# Patient Record
Sex: MALE | Race: WHITE | NOT HISPANIC OR LATINO | Employment: OTHER | URBAN - METROPOLITAN AREA
[De-identification: names, ages, dates, MRNs, and addresses within clinical notes are randomized per-mention and may not be internally consistent; named-entity substitution may affect disease eponyms.]

---

## 2023-05-25 ENCOUNTER — APPOINTMENT (OUTPATIENT)
Dept: PRIMARY CARE | Facility: CLINIC | Age: 73
End: 2023-05-25
Payer: MEDICARE

## 2024-02-21 ENCOUNTER — HOSPITAL ENCOUNTER (OUTPATIENT)
Facility: HOSPITAL | Age: 74
Setting detail: OBSERVATION
Discharge: HOME | End: 2024-02-22
Attending: EMERGENCY MEDICINE | Admitting: INTERNAL MEDICINE
Payer: MEDICARE

## 2024-02-21 ENCOUNTER — APPOINTMENT (OUTPATIENT)
Dept: CARDIOLOGY | Facility: HOSPITAL | Age: 74
End: 2024-02-21
Payer: MEDICARE

## 2024-02-21 ENCOUNTER — APPOINTMENT (OUTPATIENT)
Dept: RADIOLOGY | Facility: HOSPITAL | Age: 74
End: 2024-02-21
Payer: MEDICARE

## 2024-02-21 DIAGNOSIS — R07.9 CHEST PAIN, UNSPECIFIED TYPE: Primary | ICD-10-CM

## 2024-02-21 LAB
ALBUMIN SERPL BCP-MCNC: 4.7 G/DL (ref 3.4–5)
ALP SERPL-CCNC: 64 U/L (ref 33–136)
ALT SERPL W P-5'-P-CCNC: 20 U/L (ref 10–52)
ANION GAP SERPL CALC-SCNC: 18 MMOL/L (ref 10–20)
AST SERPL W P-5'-P-CCNC: 24 U/L (ref 9–39)
BASOPHILS # BLD AUTO: 0.03 X10*3/UL (ref 0–0.1)
BASOPHILS NFR BLD AUTO: 0.4 %
BILIRUB SERPL-MCNC: 0.6 MG/DL (ref 0–1.2)
BNP SERPL-MCNC: 77 PG/ML (ref 0–99)
BUN SERPL-MCNC: 32 MG/DL (ref 6–23)
CALCIUM SERPL-MCNC: 9.7 MG/DL (ref 8.6–10.3)
CARDIAC TROPONIN I PNL SERPL HS: 11 NG/L (ref 0–20)
CARDIAC TROPONIN I PNL SERPL HS: 11 NG/L (ref 0–20)
CHLORIDE SERPL-SCNC: 98 MMOL/L (ref 98–107)
CO2 SERPL-SCNC: 21 MMOL/L (ref 21–32)
CREAT SERPL-MCNC: 1.51 MG/DL (ref 0.5–1.3)
EGFRCR SERPLBLD CKD-EPI 2021: 48 ML/MIN/1.73M*2
EOSINOPHIL # BLD AUTO: 0.02 X10*3/UL (ref 0–0.4)
EOSINOPHIL NFR BLD AUTO: 0.3 %
ERYTHROCYTE [DISTWIDTH] IN BLOOD BY AUTOMATED COUNT: 13.8 % (ref 11.5–14.5)
GLUCOSE BLD MANUAL STRIP-MCNC: 138 MG/DL (ref 74–99)
GLUCOSE BLD MANUAL STRIP-MCNC: 95 MG/DL (ref 74–99)
GLUCOSE SERPL-MCNC: 220 MG/DL (ref 74–99)
HCT VFR BLD AUTO: 36.5 % (ref 41–52)
HGB BLD-MCNC: 12 G/DL (ref 13.5–17.5)
HOLD SPECIMEN: NORMAL
IMM GRANULOCYTES # BLD AUTO: 0.03 X10*3/UL (ref 0–0.5)
IMM GRANULOCYTES NFR BLD AUTO: 0.4 % (ref 0–0.9)
LYMPHOCYTES # BLD AUTO: 1.14 X10*3/UL (ref 0.8–3)
LYMPHOCYTES NFR BLD AUTO: 16.6 %
MAGNESIUM SERPL-MCNC: 1.82 MG/DL (ref 1.6–2.4)
MCH RBC QN AUTO: 31.7 PG (ref 26–34)
MCHC RBC AUTO-ENTMCNC: 32.9 G/DL (ref 32–36)
MCV RBC AUTO: 97 FL (ref 80–100)
MONOCYTES # BLD AUTO: 0.53 X10*3/UL (ref 0.05–0.8)
MONOCYTES NFR BLD AUTO: 7.7 %
NEUTROPHILS # BLD AUTO: 5.13 X10*3/UL (ref 1.6–5.5)
NEUTROPHILS NFR BLD AUTO: 74.6 %
NRBC BLD-RTO: 0 /100 WBCS (ref 0–0)
PLATELET # BLD AUTO: 219 X10*3/UL (ref 150–450)
POTASSIUM SERPL-SCNC: 4.4 MMOL/L (ref 3.5–5.3)
PROT SERPL-MCNC: 7.3 G/DL (ref 6.4–8.2)
RBC # BLD AUTO: 3.78 X10*6/UL (ref 4.5–5.9)
SODIUM SERPL-SCNC: 133 MMOL/L (ref 136–145)
WBC # BLD AUTO: 6.9 X10*3/UL (ref 4.4–11.3)

## 2024-02-21 PROCEDURE — 36415 COLL VENOUS BLD VENIPUNCTURE: CPT | Performed by: EMERGENCY MEDICINE

## 2024-02-21 PROCEDURE — 80053 COMPREHEN METABOLIC PANEL: CPT | Performed by: EMERGENCY MEDICINE

## 2024-02-21 PROCEDURE — 85025 COMPLETE CBC W/AUTO DIFF WBC: CPT | Performed by: EMERGENCY MEDICINE

## 2024-02-21 PROCEDURE — 99285 EMERGENCY DEPT VISIT HI MDM: CPT | Mod: 25 | Performed by: EMERGENCY MEDICINE

## 2024-02-21 PROCEDURE — G0378 HOSPITAL OBSERVATION PER HR: HCPCS

## 2024-02-21 PROCEDURE — 2500000001 HC RX 250 WO HCPCS SELF ADMINISTERED DRUGS (ALT 637 FOR MEDICARE OP): Performed by: NURSE PRACTITIONER

## 2024-02-21 PROCEDURE — 84484 ASSAY OF TROPONIN QUANT: CPT | Performed by: EMERGENCY MEDICINE

## 2024-02-21 PROCEDURE — 83735 ASSAY OF MAGNESIUM: CPT | Performed by: EMERGENCY MEDICINE

## 2024-02-21 PROCEDURE — 71045 X-RAY EXAM CHEST 1 VIEW: CPT | Mod: FOREIGN READ | Performed by: RADIOLOGY

## 2024-02-21 PROCEDURE — 71045 X-RAY EXAM CHEST 1 VIEW: CPT

## 2024-02-21 PROCEDURE — 83880 ASSAY OF NATRIURETIC PEPTIDE: CPT | Performed by: EMERGENCY MEDICINE

## 2024-02-21 PROCEDURE — 2500000004 HC RX 250 GENERAL PHARMACY W/ HCPCS (ALT 636 FOR OP/ED): Performed by: NURSE PRACTITIONER

## 2024-02-21 PROCEDURE — 93005 ELECTROCARDIOGRAM TRACING: CPT

## 2024-02-21 PROCEDURE — 82947 ASSAY GLUCOSE BLOOD QUANT: CPT | Mod: 59

## 2024-02-21 RX ORDER — ENOXAPARIN SODIUM 100 MG/ML
40 INJECTION SUBCUTANEOUS EVERY 24 HOURS
Status: DISCONTINUED | OUTPATIENT
Start: 2024-02-21 | End: 2024-02-22 | Stop reason: HOSPADM

## 2024-02-21 RX ORDER — DEXTROSE 50 % IN WATER (D50W) INTRAVENOUS SYRINGE
25
Status: DISCONTINUED | OUTPATIENT
Start: 2024-02-21 | End: 2024-02-22 | Stop reason: HOSPADM

## 2024-02-21 RX ORDER — DEXTROSE MONOHYDRATE 100 MG/ML
0.3 INJECTION, SOLUTION INTRAVENOUS ONCE AS NEEDED
Status: DISCONTINUED | OUTPATIENT
Start: 2024-02-21 | End: 2024-02-22 | Stop reason: HOSPADM

## 2024-02-21 RX ORDER — INSULIN LISPRO 100 [IU]/ML
0-10 INJECTION, SOLUTION INTRAVENOUS; SUBCUTANEOUS
Status: DISCONTINUED | OUTPATIENT
Start: 2024-02-21 | End: 2024-02-22 | Stop reason: HOSPADM

## 2024-02-21 RX ORDER — NAPROXEN SODIUM 220 MG/1
324 TABLET, FILM COATED ORAL ONCE
Status: COMPLETED | OUTPATIENT
Start: 2024-02-21 | End: 2024-02-21

## 2024-02-21 RX ADMIN — ENOXAPARIN SODIUM 40 MG: 40 INJECTION SUBCUTANEOUS at 21:58

## 2024-02-21 RX ADMIN — ASPIRIN 81 MG 324 MG: 81 TABLET ORAL at 21:57

## 2024-02-21 SDOH — SOCIAL STABILITY: SOCIAL INSECURITY: ARE THERE ANY APPARENT SIGNS OF INJURIES/BEHAVIORS THAT COULD BE RELATED TO ABUSE/NEGLECT?: NO

## 2024-02-21 SDOH — SOCIAL STABILITY: SOCIAL INSECURITY: HAVE YOU HAD THOUGHTS OF HARMING ANYONE ELSE?: NO

## 2024-02-21 SDOH — SOCIAL STABILITY: SOCIAL INSECURITY: HAS ANYONE EVER THREATENED TO HURT YOUR FAMILY OR YOUR PETS?: NO

## 2024-02-21 SDOH — SOCIAL STABILITY: SOCIAL INSECURITY: WERE YOU ABLE TO COMPLETE ALL THE BEHAVIORAL HEALTH SCREENINGS?: YES

## 2024-02-21 SDOH — SOCIAL STABILITY: SOCIAL INSECURITY: ABUSE: ADULT

## 2024-02-21 SDOH — SOCIAL STABILITY: SOCIAL INSECURITY: DO YOU FEEL UNSAFE GOING BACK TO THE PLACE WHERE YOU ARE LIVING?: NO

## 2024-02-21 SDOH — SOCIAL STABILITY: SOCIAL INSECURITY: DOES ANYONE TRY TO KEEP YOU FROM HAVING/CONTACTING OTHER FRIENDS OR DOING THINGS OUTSIDE YOUR HOME?: NO

## 2024-02-21 SDOH — SOCIAL STABILITY: SOCIAL INSECURITY: ARE YOU OR HAVE YOU BEEN THREATENED OR ABUSED PHYSICALLY, EMOTIONALLY, OR SEXUALLY BY ANYONE?: NO

## 2024-02-21 SDOH — SOCIAL STABILITY: SOCIAL INSECURITY: DO YOU FEEL ANYONE HAS EXPLOITED OR TAKEN ADVANTAGE OF YOU FINANCIALLY OR OF YOUR PERSONAL PROPERTY?: NO

## 2024-02-21 ASSESSMENT — COGNITIVE AND FUNCTIONAL STATUS - GENERAL
STANDING UP FROM CHAIR USING ARMS: A LITTLE
DAILY ACTIVITIY SCORE: 19
DRESSING REGULAR LOWER BODY CLOTHING: A LITTLE
MOVING TO AND FROM BED TO CHAIR: A LITTLE
DRESSING REGULAR UPPER BODY CLOTHING: A LITTLE
MOBILITY SCORE: 19
PERSONAL GROOMING: A LITTLE
HELP NEEDED FOR BATHING: A LITTLE
TOILETING: A LITTLE
WALKING IN HOSPITAL ROOM: A LITTLE
CLIMB 3 TO 5 STEPS WITH RAILING: A LITTLE
TURNING FROM BACK TO SIDE WHILE IN FLAT BAD: A LITTLE
PATIENT BASELINE BEDBOUND: NO

## 2024-02-21 ASSESSMENT — ACTIVITIES OF DAILY LIVING (ADL)
BATHING: INDEPENDENT
PATIENT'S MEMORY ADEQUATE TO SAFELY COMPLETE DAILY ACTIVITIES?: YES
HEARING - LEFT EAR: FUNCTIONAL
LACK_OF_TRANSPORTATION: NO
DRESSING YOURSELF: INDEPENDENT
TOILETING: INDEPENDENT
FEEDING YOURSELF: INDEPENDENT
WALKS IN HOME: NEEDS ASSISTANCE
ADEQUATE_TO_COMPLETE_ADL: YES
HEARING - RIGHT EAR: FUNCTIONAL
GROOMING: INDEPENDENT
JUDGMENT_ADEQUATE_SAFELY_COMPLETE_DAILY_ACTIVITIES: YES

## 2024-02-21 ASSESSMENT — LIFESTYLE VARIABLES
AUDIT-C TOTAL SCORE: 0
HOW OFTEN DO YOU HAVE A DRINK CONTAINING ALCOHOL: NEVER
SKIP TO QUESTIONS 9-10: 1
HOW MANY STANDARD DRINKS CONTAINING ALCOHOL DO YOU HAVE ON A TYPICAL DAY: PATIENT DOES NOT DRINK
AUDIT-C TOTAL SCORE: 0
HOW OFTEN DO YOU HAVE 6 OR MORE DRINKS ON ONE OCCASION: NEVER

## 2024-02-21 ASSESSMENT — PAIN DESCRIPTION - ORIENTATION: ORIENTATION: ANTERIOR

## 2024-02-21 ASSESSMENT — PAIN DESCRIPTION - LOCATION: LOCATION: CHEST

## 2024-02-21 ASSESSMENT — PATIENT HEALTH QUESTIONNAIRE - PHQ9
2. FEELING DOWN, DEPRESSED OR HOPELESS: NOT AT ALL
1. LITTLE INTEREST OR PLEASURE IN DOING THINGS: NOT AT ALL
SUM OF ALL RESPONSES TO PHQ9 QUESTIONS 1 & 2: 0

## 2024-02-21 ASSESSMENT — COLUMBIA-SUICIDE SEVERITY RATING SCALE - C-SSRS
2. HAVE YOU ACTUALLY HAD ANY THOUGHTS OF KILLING YOURSELF?: NO
1. IN THE PAST MONTH, HAVE YOU WISHED YOU WERE DEAD OR WISHED YOU COULD GO TO SLEEP AND NOT WAKE UP?: NO
6. HAVE YOU EVER DONE ANYTHING, STARTED TO DO ANYTHING, OR PREPARED TO DO ANYTHING TO END YOUR LIFE?: NO

## 2024-02-21 ASSESSMENT — PAIN SCALES - GENERAL: PAINLEVEL_OUTOF10: 6

## 2024-02-21 ASSESSMENT — PAIN DESCRIPTION - FREQUENCY: FREQUENCY: INTERMITTENT

## 2024-02-21 ASSESSMENT — PAIN - FUNCTIONAL ASSESSMENT: PAIN_FUNCTIONAL_ASSESSMENT: 0-10

## 2024-02-21 ASSESSMENT — PAIN DESCRIPTION - DESCRIPTORS: DESCRIPTORS: PRESSURE

## 2024-02-22 VITALS
BODY MASS INDEX: 33.6 KG/M2 | RESPIRATION RATE: 18 BRPM | WEIGHT: 240 LBS | TEMPERATURE: 97.3 F | HEART RATE: 60 BPM | DIASTOLIC BLOOD PRESSURE: 58 MMHG | SYSTOLIC BLOOD PRESSURE: 118 MMHG | OXYGEN SATURATION: 92 % | HEIGHT: 71 IN

## 2024-02-22 LAB
ANION GAP SERPL CALC-SCNC: 15 MMOL/L (ref 10–20)
APTT PPP: 33 SECONDS (ref 27–38)
BUN SERPL-MCNC: 33 MG/DL (ref 6–23)
CALCIUM SERPL-MCNC: 9.1 MG/DL (ref 8.6–10.3)
CARDIAC TROPONIN I PNL SERPL HS: 13 NG/L (ref 0–20)
CHLORIDE SERPL-SCNC: 102 MMOL/L (ref 98–107)
CHOLEST SERPL-MCNC: 170 MG/DL (ref 0–199)
CHOLESTEROL/HDL RATIO: 3.5
CO2 SERPL-SCNC: 21 MMOL/L (ref 21–32)
CREAT SERPL-MCNC: 1.32 MG/DL (ref 0.5–1.3)
EGFRCR SERPLBLD CKD-EPI 2021: 56 ML/MIN/1.73M*2
ERYTHROCYTE [DISTWIDTH] IN BLOOD BY AUTOMATED COUNT: 13.6 % (ref 11.5–14.5)
GLUCOSE BLD MANUAL STRIP-MCNC: 155 MG/DL (ref 74–99)
GLUCOSE BLD MANUAL STRIP-MCNC: 184 MG/DL (ref 74–99)
GLUCOSE SERPL-MCNC: 135 MG/DL (ref 74–99)
HCT VFR BLD AUTO: 34.7 % (ref 41–52)
HDLC SERPL-MCNC: 49.2 MG/DL
HGB BLD-MCNC: 11.5 G/DL (ref 13.5–17.5)
INR PPP: 1.1 (ref 0.9–1.1)
LDLC SERPL CALC-MCNC: 97 MG/DL
MCH RBC QN AUTO: 32.5 PG (ref 26–34)
MCHC RBC AUTO-ENTMCNC: 33.1 G/DL (ref 32–36)
MCV RBC AUTO: 98 FL (ref 80–100)
NON HDL CHOLESTEROL: 121 MG/DL (ref 0–149)
NRBC BLD-RTO: 0 /100 WBCS (ref 0–0)
PLATELET # BLD AUTO: 195 X10*3/UL (ref 150–450)
POTASSIUM SERPL-SCNC: 4.1 MMOL/L (ref 3.5–5.3)
PROTHROMBIN TIME: 12.8 SECONDS (ref 9.8–12.8)
RBC # BLD AUTO: 3.54 X10*6/UL (ref 4.5–5.9)
SODIUM SERPL-SCNC: 134 MMOL/L (ref 136–145)
TRIGL SERPL-MCNC: 120 MG/DL (ref 0–149)
VLDL: 24 MG/DL (ref 0–40)
WBC # BLD AUTO: 5.4 X10*3/UL (ref 4.4–11.3)

## 2024-02-22 PROCEDURE — G0378 HOSPITAL OBSERVATION PER HR: HCPCS

## 2024-02-22 PROCEDURE — 82374 ASSAY BLOOD CARBON DIOXIDE: CPT | Performed by: NURSE PRACTITIONER

## 2024-02-22 PROCEDURE — 82947 ASSAY GLUCOSE BLOOD QUANT: CPT | Mod: 59

## 2024-02-22 PROCEDURE — 85027 COMPLETE CBC AUTOMATED: CPT | Performed by: NURSE PRACTITIONER

## 2024-02-22 PROCEDURE — 84484 ASSAY OF TROPONIN QUANT: CPT | Performed by: NURSE PRACTITIONER

## 2024-02-22 PROCEDURE — 96374 THER/PROPH/DIAG INJ IV PUSH: CPT | Performed by: INTERNAL MEDICINE

## 2024-02-22 PROCEDURE — 99222 1ST HOSP IP/OBS MODERATE 55: CPT | Performed by: NURSE PRACTITIONER

## 2024-02-22 PROCEDURE — 99235 HOSP IP/OBS SAME DATE MOD 70: CPT | Performed by: INTERNAL MEDICINE

## 2024-02-22 PROCEDURE — 36415 COLL VENOUS BLD VENIPUNCTURE: CPT | Performed by: NURSE PRACTITIONER

## 2024-02-22 PROCEDURE — 97165 OT EVAL LOW COMPLEX 30 MIN: CPT | Mod: GO

## 2024-02-22 PROCEDURE — 85610 PROTHROMBIN TIME: CPT | Performed by: NURSE PRACTITIONER

## 2024-02-22 PROCEDURE — 97162 PT EVAL MOD COMPLEX 30 MIN: CPT | Mod: GP

## 2024-02-22 PROCEDURE — 97530 THERAPEUTIC ACTIVITIES: CPT | Mod: GP

## 2024-02-22 PROCEDURE — 80061 LIPID PANEL: CPT | Performed by: NURSE PRACTITIONER

## 2024-02-22 PROCEDURE — 2500000004 HC RX 250 GENERAL PHARMACY W/ HCPCS (ALT 636 FOR OP/ED): Performed by: NURSE PRACTITIONER

## 2024-02-22 PROCEDURE — 96372 THER/PROPH/DIAG INJ SC/IM: CPT

## 2024-02-22 PROCEDURE — 2500000002 HC RX 250 W HCPCS SELF ADMINISTERED DRUGS (ALT 637 FOR MEDICARE OP, ALT 636 FOR OP/ED): Performed by: NURSE PRACTITIONER

## 2024-02-22 RX ORDER — ONDANSETRON HYDROCHLORIDE 2 MG/ML
4 INJECTION, SOLUTION INTRAVENOUS EVERY 4 HOURS PRN
Status: DISCONTINUED | OUTPATIENT
Start: 2024-02-22 | End: 2024-02-22 | Stop reason: HOSPADM

## 2024-02-22 RX ORDER — PANTOPRAZOLE SODIUM 40 MG/1
40 TABLET, DELAYED RELEASE ORAL DAILY
Qty: 30 TABLET | Refills: 0 | Status: SHIPPED | OUTPATIENT
Start: 2024-02-22 | End: 2024-02-27 | Stop reason: SDUPTHER

## 2024-02-22 RX ORDER — MORPHINE SULFATE 4 MG/ML
4 INJECTION, SOLUTION INTRAMUSCULAR; INTRAVENOUS ONCE
Status: COMPLETED | OUTPATIENT
Start: 2024-02-22 | End: 2024-02-22

## 2024-02-22 RX ORDER — PANTOPRAZOLE SODIUM 40 MG/1
40 TABLET, DELAYED RELEASE ORAL DAILY
Qty: 30 TABLET | Refills: 0 | Status: SHIPPED | OUTPATIENT
Start: 2024-02-22 | End: 2024-02-22 | Stop reason: SDUPTHER

## 2024-02-22 RX ADMIN — MORPHINE SULFATE 4 MG: 4 INJECTION, SOLUTION INTRAMUSCULAR; INTRAVENOUS at 02:48

## 2024-02-22 RX ADMIN — INSULIN LISPRO 2 UNITS: 100 INJECTION, SOLUTION INTRAVENOUS; SUBCUTANEOUS at 12:16

## 2024-02-22 RX ADMIN — INSULIN LISPRO 2 UNITS: 100 INJECTION, SOLUTION INTRAVENOUS; SUBCUTANEOUS at 06:50

## 2024-02-22 ASSESSMENT — PAIN - FUNCTIONAL ASSESSMENT
PAIN_FUNCTIONAL_ASSESSMENT: 0-10
PAIN_FUNCTIONAL_ASSESSMENT: 0-10

## 2024-02-22 ASSESSMENT — COGNITIVE AND FUNCTIONAL STATUS - GENERAL
PERSONAL GROOMING: A LITTLE
MOBILITY SCORE: 16
CLIMB 3 TO 5 STEPS WITH RAILING: TOTAL
HELP NEEDED FOR BATHING: A LOT
DAILY ACTIVITIY SCORE: 15
EATING MEALS: A LITTLE
WALKING IN HOSPITAL ROOM: A LITTLE
DRESSING REGULAR LOWER BODY CLOTHING: A LOT
STANDING UP FROM CHAIR USING ARMS: A LITTLE
TOILETING: A LOT
MOVING FROM LYING ON BACK TO SITTING ON SIDE OF FLAT BED WITH BEDRAILS: A LITTLE
DRESSING REGULAR UPPER BODY CLOTHING: A LITTLE
TURNING FROM BACK TO SIDE WHILE IN FLAT BAD: A LITTLE
MOVING TO AND FROM BED TO CHAIR: A LITTLE

## 2024-02-22 ASSESSMENT — PAIN SCALES - GENERAL
PAINLEVEL_OUTOF10: 7
PAINLEVEL_OUTOF10: 7
PAINLEVEL_OUTOF10: 0 - NO PAIN

## 2024-02-22 ASSESSMENT — PAIN DESCRIPTION - LOCATION: LOCATION: GENERALIZED

## 2024-02-22 NOTE — PROGRESS NOTES
Occupational Therapy    Evaluation    Patient Name: Albert Abbott  MRN: [id number]  Today's Date: 2/22/2024  Time Calculation  Start Time: 1359  Stop Time: 1430  Time Calculation (min): 31 min    Assessment  IP OT Assessment  Prognosis: Good  End of Session Communication: Bedside nurse, Care Coordinator  End of Session Patient Position: Bed, 2 rail up (bed alarm not on/not on at start of session, call light in reach)    Plan:  Treatment Interventions: ADL retraining, Functional transfer training, UE strengthening/ROM, Neuromuscular reeducation, Compensatory technique education  OT Frequency: 3 times per week  OT Discharge Recommendations: Moderate intensity level of continued care, 24 hr supervision due to cognition (24 hour hands on assist for balance/safety as patient is high fall risk.)  OT - OK to Discharge: Yes (from an O.T. standpoint)    Subjective     Current Problem:  1. Chest pain, unspecified type  pantoprazole (ProtoNix) 40 mg EC tablet          General:  General  Reason for Referral: OT eval & treat for ADLs  Referred By: Lakesha Matthews, DEIDRE-CNP  Past Medical History Relevant to Rehab: cc:chest tightness, SOB; Troponins negative. (Hypertension, diabetes, anxiety, COPD, seizure x 1, osteoarthritis)  Prior to Session Communication: Bedside nurse  Patient Position Received: Bed, 2 rail up    Precautions:  Precautions Comment: fall/safety       Pain:  Pain Assessment  Pain Assessment: 0-10  Pain Score: 7  Pain Type: Chronic pain  Pain Location:  (back, BLE)    Objective     Cognition:  Overall Cognitive Status:  (A & O x 3, questionable historian; requires mod verbal and tactile cues for safety. Impaired insight into balance/safety deficits. High fall risk.)     Home Living:  Home Living Comments: Lives with wife; bed/bath one level; basement entry from garage with stair lift; Patient reports independent ADLS with reachers, sock aide & long shoe horn; Stall shower with seat & grab bar, raised toilet  seat in basement; Patient reports he is independent transfers & mobility with cane or rollator. Wife does all IADLs; patient does not drive.       ADL:  Grooming Assistance: Stand by  UE Dressing Assistance: Minimal  LE Dressing Assistance: Maximal  Toileting Assistance with Device: Moderate  ADL Comments: assist all ADLs secondary to impaired sit and stand balance, impaired safety ad impaired BUE ROM & flexibility.         Bed Mobility/Transfers:   Bed Mobility  Bed Mobility:  (SBA supine <-> sit with rail; labored, increased time)  Transfers  Transfer:  (min assist sit to stand from edge of bed; labored; mod cues safety;  min assist to stand from raised toilet with rails.)    Ambulation/Gait Training:  Ambulation/Gait Training  Ambulation/Gait Training Performed:  (Patient atttempted to use cane initially; unable to balance; Required min assist/CGA during mobility with wheeled walker.)    Sitting Balance:  Static Sitting Balance  Static Sitting-Level of Assistance:  (SBA with UE support)  Dynamic Sitting Balance  Dynamic Sitting-Balance:  (CGA with UE support)       Coordination:  Coordination Comment: tremulous BUE; finger opposition grossly WFL;          Extremities: RUE   RUE :  (AROM grossly 90 degrees shoulder flexion, 3/4 range ER/IR & elbow flexion/extension, WFL wrist/hand.  MMT grossly 3-/5 proximally, 4-/5 distally.) and LUE   LUE:  (AROM grossly 60 degrees shoulder flexion with pain; 3/4 range ER/IR & elbow flexion/extension, WFL wrist/hand.  MMT 2-/5 proximally, 4-/5 distally.)    Outcome Measures: [facility] Daily Activity  Putting on and taking off regular lower body clothing: A lot  Bathing (including washing, rinsing, drying): A lot  Putting on and taking off regular upper body clothing: A little  Toileting, which includes using toilet, bedpan or urinal: A lot  Taking care of personal grooming such as brushing teeth: A little  Eating Meals: A little  Daily Activity - Total Score: 15            EDUCATION:  Education  Individual(s) Educated: Patient  Education Provided: Fall precautons  Patient/Caregiver Demonstrated Understanding: yes  Education Documentation  ADL Training, taught by Yecenia Hernadez OT at 2/22/2024  3:07 PM.  Learner: Patient  Readiness: Acceptance  Method: Explanation  Response: Verbalizes Understanding, Needs Reinforcement    Education Comments  No comments found.        Goals:   Encounter Problems       Encounter Problems (Active)       OT Goals       Increase static & dynamic sit balance to supervision with UE support to promote increased independent with ADLs       Start:  02/22/24    Expected End:  03/07/24            Increase UE dressing to SBA and LE dressing to CGA with adaptive equipment as needed.        Start:  02/22/24    Expected End:  03/07/24            Increase functional transfers to/from bed, chair & commode to CGA with DME for safety        Start:  02/22/24    Expected End:  03/07/24            Demonstrate compliance to safety techs with min cues during ADLs        Start:  02/22/24    Expected End:  03/07/24

## 2024-02-22 NOTE — ED TRIAGE NOTES
Intermittent chest pressure last 2 days , radiates across anterior chest. Today came after eating pizza. Pain free now

## 2024-02-22 NOTE — PROGRESS NOTES
02/22/24 1239   Discharge Planning   Living Arrangements Spouse/significant other   Support Systems Spouse/significant other   Type of Residence Private residence   Who is requesting discharge planning? Provider   Patient expects to be discharged to: home   Financial Resource Strain   How hard is it for you to pay for the very basics like food, housing, medical care, and heating? Not very     Met with pt , pt admit for chest pain, seen this am by cardiology and is cleared for discharge,  pt will go home today, pt would benefit from c, pt refusing but did speak with pt's RN and she agrees, per pt he is walker at baseline.  RN will call Dr WARD Nguyen and get hhc orders and discharge.  Carmen Underwood RN [facility]
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
## 2024-02-22 NOTE — PROGRESS NOTES
Physical Therapy    Physical Therapy Evaluation    Patient Name: Albert Abbott  MRN: [id number]  Today's Date: 2/22/2024   Time Calculation  Start Time: 1400  Stop Time: 1430  Time Calculation (min): 30 min    Assessment/Plan   PT Assessment  PT Assessment Results: Decreased strength, Decreased endurance, Impaired balance, Decreased mobility  End of Session Communication: Bedside nurse  End of Session Patient Position: Bed, 2 rail up, Alarm off, not on at start of session (All needs in reach and no complaints noted)  IP OR SWING BED PT PLAN  Inpatient or Swing Bed: Inpatient  PT Plan  Treatment/Interventions: Bed mobility, Transfer training, Gait training  PT Plan: Skilled PT  PT Frequency: 3 times per week  PT Discharge Recommendations: Moderate intensity level of continued care  PT - OK to Discharge: Yes (once cleared by medical team)    Subjective     Current Problem:  Patient Active Problem List   Diagnosis    Chest pain, unspecified type     General Visit Information:  General  Reason for Referral: PT Eval and Treat  Referred By: Lakesha Matthews, DEIDRE-CNP  Past Medical History Relevant to Rehab: 73 y.o. male presenting to emergency department with chest tightness.  Patient states that yesterday and today he had been having intermittent chest tightness.  He describes it as a tightness across his upper chest mostly in the right upper chest.  He states that at times he feels shortness of breath, denies any history of coronary vascular disease.  Patient states he had a fall approximately 1 year ago and has since then had some chronic back pain.  Patient does state that the pain does radiate more into his neck and he was feeling anxious.  Prior to Session Communication: Bedside nurse  Patient Position Received: Bed, 2 rail up, Alarm off, not on at start of session (Agreeable to PT)    Home Living:  Home Living  Home Living Comments: Pt lives with his wife in a 2 story house with entrance to the basement through  the garage and 0 [address]. Pt has a stair glide from the basement to the main floor where his bedroom and bathroom are. There is a bathroom in the basement with a high toilet and grab bar and a full bath on the 1st floor with a walk in shower, seat, and grab bar. Pt does not use the toilet in the 1st floor bathroom.    Prior Level of Function:  Prior Function Per Pt/Caregiver Report  Level of [place]: Independent with ADLs and functional transfers (Pt amb with rollator or SPC, wife does all IADLs and driving)    Precautions:  Precautions  Precautions Comment: Fall precautions    Vital Signs:     Objective     Pain:  Pain Assessment  Pain Assessment:  (7/10 chronic low back and B LEs, repositioned for comfort s/p session, RN aware)    General Assessments:  Sensation  Light Touch: No apparent deficits  Strength  Strength Comments: B LE ROM WFL, B LE strength grossly 4-/5  Dynamic Standing Balance  Dynamic Standing-Comments: Fair- to Poor+ with RW    Functional Assessments:  Bed Mobility  Bed Mobility:  (supine <> sitting: SBA with increased effort and time required)  Transfers  Transfer:  (STS from EOB and commode: min A with cueing for hand placement. Pt demonstrated retropulsion in standing requiring increased assist to gain and maintain balance)  Ambulation/Gait Training  Ambulation/Gait Training Performed:  (Pt first requested attempting amb with his SPC, however due to retropulsion, pt was unable to gain his balance and required a RW to amb. Pt was then able to amb 25' x 2 using RW with CGA to min A. Pt is a fall risk.)    Outcome Measures:  [facility] Basic Mobility  Turning from your back to your side while in a flat bed without using bedrails: A little  Moving from lying on your back to sitting on the side of a flat bed without using bedrails: A little  Moving to and from bed to chair (including a wheelchair): A little  Standing up from a chair using your arms (e.g. wheelchair or bedside chair): A little  To walk  in hospital room: A little  Climbing 3-5 steps with railing: Total  Basic Mobility - Total Score: 16    Goals:  Encounter Problems       Encounter Problems (Active)       PT Problem       STG - Pt will transition supine <> sitting with SUP  (Progressing)       Start:  02/22/24    Expected End:  03/07/24            STG - Pt will transfer STS with SBA  (Progressing)       Start:  02/22/24    Expected End:  03/07/24            STG - Pt will amb 50' using LRD with SBA  (Progressing)       Start:  02/22/24    Expected End:  03/07/24                 Education Documentation  Precautions, taught by Mellissa Laws PT at 2/22/2024  3:01 PM.  Learner: Patient  Readiness: Acceptance  Method: Explanation  Response: Verbalizes Understanding, Needs Reinforcement    Mobility Training, taught by Mellissa Laws PT at 2/22/2024  3:01 PM.  Learner: Patient  Readiness: Acceptance  Method: Explanation  Response: Verbalizes Understanding, Needs Reinforcement    Education Comments  No comments found.

## 2024-02-22 NOTE — CARE PLAN
The patient's goals for the shift include  no chest pain    The clinical goals for the shift include Patient will remain hemodynamically stable

## 2024-02-22 NOTE — H&P
History Of Present Illness  Albert Abbott is a 73 y.o. male presenting to emergency department with chest tightness.  Patient states that yesterday and today he had been having intermittent chest tightness.  He describes it as a tightness across his upper chest mostly in the right upper chest.  He states that at times he feels shortness of breath, denies any history of coronary vascular disease.  Patient states he had a fall approximately 1 year ago and has since then had some chronic back pain.  Patient does state that the pain does radiate more into his neck and he was feeling anxious.  Patient denies dizziness, nausea, vomiting, diarrhea.  Patient denies recent illness.    In ED, troponin negative x 2, glucose 220, sodium 133, BUN 32, creatinine 1.51, GFR 48, hemoglobin 12.0, hematocrit 36.5.  EKG completed showing first-degree AV block with sinus rhythm at a rate of 81 without ST elevation or depression per ER physician review.  Chest x-ray completed and negative for acute process.  Vital signs within normal limits.  Patient given aspirin 324 p.o. patient admitted to telemetry observation under the care of Dr. Rodrigo Nguyen will continue to follow.  I was asked to do H&P and place initial admission orders.     Past Medical History  Hypertension, diabetes, anxiety, COPD, seizure x 1, osteoarthritis  Surgical History  Colonoscopy     Social History  Former smoker, no drug use, no alcohol use  Family History  Reviewed and noncontributory     Allergies  Patient has no known allergies.    Review of Systems  A 10 point review of systems was completed and negative except what is listed in HPI  Physical Exam  Constitutional:       Appearance: Normal appearance.   HENT:      Mouth/Throat:      Mouth: Mucous membranes are dry.      Pharynx: Oropharynx is clear.   Eyes:      Pupils: Pupils are equal, round, and reactive to light.   Cardiovascular:      Rate and Rhythm: Normal rate. Rhythm irregular.   Pulmonary:     "  Effort: Pulmonary effort is normal.      Breath sounds: Normal breath sounds.   Abdominal:      General: Bowel sounds are normal.      Palpations: Abdomen is soft.   Musculoskeletal:         General: Normal range of motion.      Cervical back: Normal range of motion.   Skin:     General: Skin is warm and dry.      Capillary Refill: Capillary refill takes less than 2 seconds.   Neurological:      General: No focal deficit present.      Mental Status: He is alert and oriented to person, place, and time.   Psychiatric:         Mood and Affect: Mood normal.          Last Recorded Vitals  Blood pressure 129/62, pulse 67, temperature 36.7 °C (98.1 °F), temperature source Temporal, resp. rate 20, height 1.803 m (5' 11\"), weight 109 kg (240 lb), SpO2 96 %.    Relevant Results  XR chest 1 view    Result Date: 2/21/2024  STUDY: Chest Radiograph;  2/21/2024, 7:36PM INDICATION: Chest pain. COMPARISON: 5/5/2023 XR Chest ACCESSION NUMBER(S): [id number] ORDERING CLINICIAN: IVA ATKINS TECHNIQUE:  Frontal chest (two images) was obtained at 19:22 hours. FINDINGS: CARDIOMEDIASTINAL SILHOUETTE: Heart is top normal size. Minimal calcification is seen in the thoracic aorta.  LUNGS: Lungs are clear. There is no pneumothorax.  ABDOMEN: No remarkable upper abdominal findings.  BONES: No acute osseous changes.    No detectable active cardiopulmonary disease. Signed by Justin Marley MD   Results for orders placed or performed during the hospital encounter of 02/21/24 (from the past 24 hour(s))   CBC and Auto Differential   Result Value Ref Range    WBC 6.9 4.4 - 11.3 x10*3/uL    nRBC 0.0 0.0 - 0.0 /100 WBCs    RBC 3.78 (L) 4.50 - 5.90 x10*6/uL    Hemoglobin 12.0 (L) 13.5 - 17.5 g/dL    Hematocrit 36.5 (L) 41.0 - 52.0 %    MCV 97 80 - 100 fL    MCH 31.7 26.0 - 34.0 pg    MCHC 32.9 32.0 - 36.0 g/dL    RDW 13.8 11.5 - 14.5 %    Platelets 219 150 - 450 x10*3/uL    Neutrophils % 74.6 40.0 - 80.0 %    Immature Granulocytes %, Automated 0.4 " 0.0 - 0.9 %    Lymphocytes % 16.6 13.0 - 44.0 %    Monocytes % 7.7 2.0 - 10.0 %    Eosinophils % 0.3 0.0 - 6.0 %    Basophils % 0.4 0.0 - 2.0 %    Neutrophils Absolute 5.13 1.60 - 5.50 x10*3/uL    Immature Granulocytes Absolute, Automated 0.03 0.00 - 0.50 x10*3/uL    Lymphocytes Absolute 1.14 0.80 - 3.00 x10*3/uL    Monocytes Absolute 0.53 0.05 - 0.80 x10*3/uL    Eosinophils Absolute 0.02 0.00 - 0.40 x10*3/uL    Basophils Absolute 0.03 0.00 - 0.10 x10*3/uL   Magnesium   Result Value Ref Range    Magnesium 1.82 1.60 - 2.40 mg/dL   Comprehensive metabolic panel   Result Value Ref Range    Glucose 220 (H) 74 - 99 mg/dL    Sodium 133 (L) 136 - 145 mmol/L    Potassium 4.4 3.5 - 5.3 mmol/L    Chloride 98 98 - 107 mmol/L    Bicarbonate 21 21 - 32 mmol/L    Anion Gap 18 10 - 20 mmol/L    Urea Nitrogen 32 (H) 6 - 23 mg/dL    Creatinine 1.51 (H) 0.50 - 1.30 mg/dL    eGFR 48 (L) >60 mL/min/1.73m*2    Calcium 9.7 8.6 - 10.3 mg/dL    Albumin 4.7 3.4 - 5.0 g/dL    Alkaline Phosphatase 64 33 - 136 U/L    Total Protein 7.3 6.4 - 8.2 g/dL    AST 24 9 - 39 U/L    Bilirubin, Total 0.6 0.0 - 1.2 mg/dL    ALT 20 10 - 52 U/L   B-Type Natriuretic Peptide   Result Value Ref Range    BNP 77 0 - 99 pg/mL   Troponin I, High Sensitivity, Initial   Result Value Ref Range    Troponin I, High Sensitivity 11 0 - 20 ng/L   Light Blue Top   Result Value Ref Range    Extra Tube Hold for add-ons.    Troponin, High Sensitivity, 1 Hour   Result Value Ref Range    Troponin I, High Sensitivity 11 0 - 20 ng/L   POCT GLUCOSE   Result Value Ref Range    POCT Glucose 138 (H) 74 - 99 mg/dL   POCT GLUCOSE   Result Value Ref Range    POCT Glucose 95 74 - 99 mg/dL       Assessment/Plan   Albert is a 73-year-old male patient is alert and oriented x 3 presenting to emergency department with complaint of chest tightness.  Patient states that yesterday he began to have intermittent chest tightness mostly in the right upper chest he states that it does radiate into  his neck and makes him feel anxious.  Troponin negative x 2, glucose 220, BUN 32, creatinine 1.51, GFR 48.  Chest x-ray completed and negative for acute process.  EKG showing first-degree AV block at a rate of 81 without ST elevation or depression per ER physician review.  Patient given aspirin in ED, admitted for further medical management.    Chest pain  Admit to telemetry observation per Dr. Rodrigo Nguyen  See imaging results above  Trend troponin  Aspirin 324 p.o. in ED  Telemetry monitoring  Repeat labs in a.m.    Diabetes/hypertension/seizures/osteoarthritis/COPD  Hold oral antidiabetic medications when med rec is complete  Continue home medications as appropriate when med rec is complete  Cardiac/diabetic diet  ISS  Hypoglycemia protocol    DVT Ppx  SCDs  Lovenox subcutaneous  Up to chair/bathroom privileges      I spent 25 minutes in the professional and overall care of this patient.      Mikaela Loomis, APRN-CNP

## 2024-02-22 NOTE — CARE PLAN
The patient's goals for the shift include  no chest pain    The clinical goals for the shift include Patient will remain hemodynamically stable    Problem: Safety  Goal: Patient will be injury free during hospitalization  Outcome: Progressing  Goal: I will remain free of falls  Outcome: Progressing     Problem: Daily Care  Goal: Daily care needs are met  Outcome: Progressing     Problem: Psychosocial Needs  Goal: Demonstrates ability to cope with hospitalization/illness  Outcome: Progressing  Goal: Collaborate with me, my family, and caregiver to identify my specific goals  Outcome: Progressing  Flowsheets (Taken 2/21/2024 3095)  Cultural Requests During Hospitalization: none  Spiritual Requests During Hospitalization: none

## 2024-02-22 NOTE — CONSULTS
Cardiology Consult    Impression:  Atypical chest pain.  No evidence for ACS.    Nonischemic stress test 7/22.  Hypertension  Diabetes  History of stroke   COPD  CKD  Plan:  No additional inpatient cardiac workup required.  I am okay with discharge  Follow-up with the [facility]  Chest tightness  HPI:  73-year-old man brought to hospital for evaluation of tightness in his chest.  While eating pizza yesterday felt some tightness in his chest.  Family was concerned so the squad was called.  Symptoms resolved after a few minutes.   EKG showed sinus rhythm with no ischemic changes.  Troponins are negative.  Has been observed overnight.  There has been no recurrence of the chest discomfort.  At baseline the patient is quite sedentary.  He is limited by chronic back pain.  He uses a walker to get around.  Meds:  Scheduled medications  enoxaparin, 40 mg, subcutaneous, q24h  insulin lispro, 0-10 Units, subcutaneous, Before meals & nightly      Continuous medications     PRN medications  PRN medications: dextrose 10 % in water (D10W), dextrose, glucagon, ondansetron, oxygen    PMHx:  Stroke  Hypertension  Diabetes  COPD  Chronic mechanical back pain  Social history:  Lives with his wife.  No cigarettes or alcohol.  Family history:  Noncontributory  Review of systems:  See HPI  Physical exam:  Vitals:    02/22/24 0726   BP: 118/58   Pulse: 60   Resp:    Temp: 36.3 °C (97.3 °F)   SpO2: 92%      JVP not elevated. Carotid upstroke normal. No carotid bruits. Heart sounds normal. No extra sounds or murmurs. Chest clear. Abdomen soft, nontender. No masses. Peripheral pulses palpable. No edema.  EKG:  Sinus bradycardia, first-degree AV block.  Diffuse specific ST-T abnormalities  Echo:  2022: Normal EF    The  Labs:  Lab Results   Component Value Date    WBC 5.4 02/22/2024    HGB 11.5 (L) 02/22/2024    HCT 34.7 (L) 02/22/2024     02/22/2024    CHOL 170 02/22/2024    TRIG 120 02/22/2024    HDL 49.2 02/22/2024    ALT 20 02/21/2024     AST 24 02/21/2024     (L) 02/22/2024    K 4.1 02/22/2024     02/22/2024    CREATININE 1.32 (H) 02/22/2024    BUN 33 (H) 02/22/2024    CO2 21 02/22/2024    TSH 0.65 01/19/2021    INR 1.1 02/22/2024    HGBA1C 7.2 01/19/2021     par

## 2024-02-23 ENCOUNTER — HOME HEALTH ADMISSION (OUTPATIENT)
Dept: HOME HEALTH SERVICES | Facility: HOME HEALTH | Age: 74
End: 2024-02-23
Payer: MEDICARE

## 2024-02-24 LAB
ATRIAL RATE: 81 BPM
P AXIS: 66 DEGREES
PR INTERVAL: 376 MS
Q ONSET: 218 MS
QRS COUNT: 13 BEATS
QRS DURATION: 88 MS
QT INTERVAL: 366 MS
QTC CALCULATION(BAZETT): 425 MS
QTC FREDERICIA: 404 MS
R AXIS: 57 DEGREES
T AXIS: 102 DEGREES
T OFFSET: 401 MS
VENTRICULAR RATE: 81 BPM

## 2024-02-26 ENCOUNTER — HOME CARE VISIT (OUTPATIENT)
Dept: HOME HEALTH SERVICES | Facility: HOME HEALTH | Age: 74
End: 2024-02-26

## 2024-02-27 DIAGNOSIS — R07.9 CHEST PAIN, UNSPECIFIED TYPE: ICD-10-CM

## 2024-02-27 RX ORDER — PANTOPRAZOLE SODIUM 40 MG/1
40 TABLET, DELAYED RELEASE ORAL DAILY
Qty: 30 TABLET | Refills: 8 | Status: SHIPPED | OUTPATIENT
Start: 2024-02-27 | End: 2024-11-23

## 2024-02-28 DIAGNOSIS — R07.9 CHEST PAIN, UNSPECIFIED TYPE: ICD-10-CM

## 2024-02-28 NOTE — DISCHARGE SUMMARY
Discharge Diagnosis  Chest pain, unspecified type    Issues Requiring Follow-Up  Chest pain    Test Results Pending At Discharge  Pending Labs       No current pending labs.            Hospital Course   Albert Abbott is a 73 y.o. male presenting to emergency department with chest tightness.  Patient states that yesterday and today he had been having intermittent chest tightness.  He describes it as a tightness across his upper chest mostly in the right upper chest.  He states that at times he feels shortness of breath, denies any history of coronary vascular disease.  Patient states he had a fall approximately 1 year ago and has since then had some chronic back pain.  Patient does state that the pain does radiate more into his neck and he was feeling anxious.  Patient denies dizziness, nausea, vomiting, diarrhea.  Patient denies recent illness.     In ED, troponin negative x 2, glucose 220, sodium 133, BUN 32, creatinine 1.51, GFR 48, hemoglobin 12.0, hematocrit 36.5.  EKG completed showing first-degree AV block with sinus rhythm at a rate of 81 without ST elevation or depression per ER physician review.  Chest x-ray completed and negative for acute process.  Vital signs within normal limits.  Patient given aspirin 89 p.o. patient admitted to telemetry observation. He was seen by Cardiology and cleared for discharge.    Pertinent Physical Exam At Time of Discharge  Physical Exam  Constitutional:       Appearance: Normal appearance.   HENT:      Mouth/Throat:      Mouth: Mucous membranes are dry.      Pharynx: Oropharynx is clear.   Eyes:      Pupils: Pupils are equal, round, and reactive to light.   Cardiovascular:      Rate and Rhythm: Normal rate. Rhythm irregular.   Pulmonary:      Effort: Pulmonary effort is normal.      Breath sounds: Normal breath sounds.   Abdominal:      General: Bowel sounds are normal.      Palpations: Abdomen is soft.   Musculoskeletal:         General: Normal range of motion.       Cervical back: Normal range of motion.   Skin:     General: Skin is warm and dry.      Capillary Refill: Capillary refill takes less than 2 seconds.   Neurological:      General: No focal deficit present.      Mental Status: He is alert and oriented to person, place, and time.   Psychiatric:         Mood and Affect: Mood normal.      Home Medications     Medication List      You have not been prescribed any medications.       Outpatient Follow-Up  Future Appointments   Date Time Provider Department [place]   3/5/2024 To Be Determined Katia Morataya, PT [facility]   3/5/2024 To Be Determined Bassem Hill, OT [facility]       Rodrigo Nguyen, DO

## 2024-03-05 ENCOUNTER — HOME CARE VISIT (OUTPATIENT)
Dept: HOME HEALTH SERVICES | Facility: HOME HEALTH | Age: 74
End: 2024-03-05
Payer: MEDICARE

## 2024-03-05 VITALS
OXYGEN SATURATION: 96 % | BODY MASS INDEX: 34.5 KG/M2 | HEIGHT: 70 IN | WEIGHT: 241 LBS | HEART RATE: 59 BPM | DIASTOLIC BLOOD PRESSURE: 56 MMHG | RESPIRATION RATE: 20 BRPM | SYSTOLIC BLOOD PRESSURE: 110 MMHG

## 2024-03-05 PROCEDURE — G0151 HHCP-SERV OF PT,EA 15 MIN: HCPCS | Mod: HHH

## 2024-03-05 PROCEDURE — 1090000002 HH PPS REVENUE DEBIT

## 2024-03-05 PROCEDURE — 169592 NO-PAY CLAIM PROCEDURE

## 2024-03-05 PROCEDURE — 1090000001 HH PPS REVENUE CREDIT

## 2024-03-05 PROCEDURE — 0023 HH SOC

## 2024-03-05 SDOH — HEALTH STABILITY: PHYSICAL HEALTH: EXERCISE TYPE: STRENGTHENING AND ROM

## 2024-03-05 SDOH — HEALTH STABILITY: PHYSICAL HEALTH: EXERCISE ACTIVITY: SITTING ANKLE PUMPS, LAQ, HIP ABD/ADD/FLEX

## 2024-03-05 SDOH — HEALTH STABILITY: PHYSICAL HEALTH: EXERCISE ACTIVITIES SETS: 1

## 2024-03-05 SDOH — HEALTH STABILITY: PHYSICAL HEALTH: PHYSICAL EXERCISE: 10

## 2024-03-05 SDOH — HEALTH STABILITY: PHYSICAL HEALTH: PHYSICAL EXERCISE: SITTING

## 2024-03-05 ASSESSMENT — ACTIVITIES OF DAILY LIVING (ADL)
OASIS_M1830: 05
AMBULATION ASSISTANCE: STAND BY ASSIST
AMBULATION ASSISTANCE: 1
ENTERING_EXITING_HOME: MINIMUM ASSIST
CURRENT_FUNCTION: STAND BY ASSIST
PHYSICAL TRANSFERS ASSESSED: 1
AMBULATION_DISTANCE/DURATION_TOLERATED: 30 FEET X2
AMBULATION ASSISTANCE ON FLAT SURFACES: 1

## 2024-03-05 ASSESSMENT — ENCOUNTER SYMPTOMS
SUBJECTIVE PAIN PROGRESSION: UNCHANGED
PAIN: 1
PAIN LOCATION: GENERALIZED
PAIN SEVERITY GOAL: 0/10
PERSON REPORTING PAIN: PATIENT
OCCASIONAL FEELINGS OF UNSTEADINESS: 1
HIGHEST PAIN SEVERITY IN PAST 24 HOURS: 7/10
LOWEST PAIN SEVERITY IN PAST 24 HOURS: 7/10

## 2024-03-05 ASSESSMENT — GAIT ASSESSMENTS
TRUNK: 2 - NO SWAY, NO FLEXION, NO USE OF ARMS, NO WALKING AID
PATH: 1 - MILD/MODERATE DEVIATION OR USES WALKING AID
STEP SYMMETRY: 1 - RIGHT AND LEFT STEP LENGTH APPEAR EQUAL
STEP CONTINUITY: 1 - STEPS APPEAR CONTINUOUS
PATH SCORE: 1
WALKING STANCE: 0 - HEELS APART
GAIT SCORE: 8
BALANCE AND GAIT SCORE: 18
TRUNK SCORE: 2
INITIATION OF GAIT IMMEDIATELY AFTER GO: 1 - NO HESITANCY

## 2024-03-05 ASSESSMENT — BALANCE ASSESSMENTS
EYES CLOSED AT MAXIMUM POSITION NUDGED: 0 - UNSTEADY
ARISES: 1 - ABLE, USES ARMS TO HELP
ARISING SCORE: 1
SITTING DOWN: 1 - USES ARMS OR NOT SMOOTH MOTION
BALANCE SCORE: 10
SITTING BALANCE: 1 - STEADY, SAFE
NUDGED SCORE: 1
IMMEDIATE STANDING BALANCE FIRST 5 SECONDS: 1 - STEADY BUT USES WALKER OR OTHER SUPPORT
TURNING 360 DEGREES STEPS: 1 - CONTINUOUS STEPS
NUDGED: 1 - STAGGERS, GRABS, CATCHES SELF
STANDING BALANCE: 1 - STEADY BUT WIDE STANCE AND USES CANE OR OTHER SUPPORT
ATTEMPTS TO ARISE: 2 - ABLE TO RISE, ONE ATTEMPT

## 2024-03-06 ENCOUNTER — HOME CARE VISIT (OUTPATIENT)
Dept: HOME HEALTH SERVICES | Facility: HOME HEALTH | Age: 74
End: 2024-03-06
Payer: MEDICARE

## 2024-03-06 PROCEDURE — 1090000002 HH PPS REVENUE DEBIT

## 2024-03-06 PROCEDURE — 1090000001 HH PPS REVENUE CREDIT

## 2024-03-07 PROCEDURE — 1090000002 HH PPS REVENUE DEBIT

## 2024-03-07 PROCEDURE — 1090000001 HH PPS REVENUE CREDIT

## 2024-03-08 ENCOUNTER — HOME CARE VISIT (OUTPATIENT)
Dept: HOME HEALTH SERVICES | Facility: HOME HEALTH | Age: 74
End: 2024-03-08
Payer: MEDICARE

## 2024-03-08 PROCEDURE — G0152 HHCP-SERV OF OT,EA 15 MIN: HCPCS | Mod: HHH

## 2024-03-08 PROCEDURE — 1090000002 HH PPS REVENUE DEBIT

## 2024-03-08 PROCEDURE — 1090000001 HH PPS REVENUE CREDIT

## 2024-03-08 ASSESSMENT — ACTIVITIES OF DAILY LIVING (ADL)
GROOMING ASSESSED: 1
TOILETING: 1
FEEDING: INDEPENDENT
CURRENT_FUNCTION: SUPERVISION
GROOMING_CURRENT_FUNCTION: INDEPENDENT
LAUNDRY ASSESSED: 1
LAUNDRY: DEPENDENT
PHYSICAL TRANSFERS ASSESSED: 1
TOILETING: INDEPENDENT
PREPARING MEALS: DEPENDENT
DRESSING_UB_CURRENT_FUNCTION: INDEPENDENT
BATHING ASSESSED: 1
FEEDING ASSESSED: 1
BATHING_CURRENT_FUNCTION: MODERATE ASSIST
DRESSING_LB_CURRENT_FUNCTION: INDEPENDENT

## 2024-03-08 ASSESSMENT — ENCOUNTER SYMPTOMS
HIGHEST PAIN SEVERITY IN PAST 24 HOURS: 9/10
PERSON REPORTING PAIN: PATIENT
LOWEST PAIN SEVERITY IN PAST 24 HOURS: 7/10
PAIN: 1

## 2024-03-08 NOTE — HOME HEALTH
Patient seen with spouse for OT evaluation visit. Patient was recently hospitalized with chest pain. Lives with spouse in a ranch home, there is a stair lift from the garage to the main level of the home. He has a full bathroom with a walk-in shower on the 1st floor, there is another bathroom on the lower level.     Medical history of schizophrenia, HLD, GERD, DM, HTN, epilepsy.     Patient has a straight cane, shower chair, wall mounted grab bars, wheeled walker (uses in the community), toilet safety frame in basement bathroom. Recommended toilet safety frame for 1st floor toilet-gave ordering info.     Prior to hospitalization, was independent with ADLs, except had assistance with showering. Spouse performed IADLs but he would help fold clothing. He states he usually doesn't drive but would drive short distances to the store. Used a cane for ambulation.     Barthel index-90 out of 100.     UE AROM WNL except shoulder flex/abd to approximately 90 degrees. UE strength 4 out of 5, except shoulders 3M out of 5. Patient and spouse in agreement with OT POC and no further OT visits indicated. Instructed on safety techniques and equipment use for showering, he is otherwise functioning at his baseline.

## 2024-03-09 PROCEDURE — 1090000002 HH PPS REVENUE DEBIT

## 2024-03-09 PROCEDURE — 1090000001 HH PPS REVENUE CREDIT

## 2024-03-09 NOTE — CASE COMMUNICATION
OT evaluation/DC visit completed 3.8.24, goals met. PT services are still active. Instructed on safety techniques and equipment use for showering.

## 2024-03-10 PROCEDURE — 1090000002 HH PPS REVENUE DEBIT

## 2024-03-10 PROCEDURE — 1090000001 HH PPS REVENUE CREDIT

## 2024-03-11 ENCOUNTER — HOME CARE VISIT (OUTPATIENT)
Dept: HOME HEALTH SERVICES | Facility: HOME HEALTH | Age: 74
End: 2024-03-11
Payer: MEDICARE

## 2024-03-11 PROCEDURE — 1090000001 HH PPS REVENUE CREDIT

## 2024-03-11 PROCEDURE — 1090000002 HH PPS REVENUE DEBIT

## 2024-03-12 ENCOUNTER — HOME CARE VISIT (OUTPATIENT)
Dept: HOME HEALTH SERVICES | Facility: HOME HEALTH | Age: 74
End: 2024-03-12
Payer: MEDICARE

## 2024-03-12 VITALS — RESPIRATION RATE: 20 BRPM

## 2024-03-12 PROCEDURE — 1090000002 HH PPS REVENUE DEBIT

## 2024-03-12 PROCEDURE — 1090000001 HH PPS REVENUE CREDIT

## 2024-03-12 PROCEDURE — G0151 HHCP-SERV OF PT,EA 15 MIN: HCPCS | Mod: HHH

## 2024-03-12 SDOH — HEALTH STABILITY: PHYSICAL HEALTH: RESISTANCE: ORANGE, LEVEL 2 THERABAND

## 2024-03-12 SDOH — HEALTH STABILITY: PHYSICAL HEALTH: PHYSICAL EXERCISE: STANDNG WITH UE SUPPORT

## 2024-03-12 SDOH — HEALTH STABILITY: PHYSICAL HEALTH: EXERCISE ACTIVITIES SETS: 1

## 2024-03-12 SDOH — HEALTH STABILITY: PHYSICAL HEALTH: EXERCISE ACTIVITY: TOE/HEEL RAISES, MINISQUATS, MARCHING

## 2024-03-12 SDOH — HEALTH STABILITY: PHYSICAL HEALTH: PHYSICAL EXERCISE: SITTING

## 2024-03-12 SDOH — HEALTH STABILITY: PHYSICAL HEALTH: EXERCISE TYPE: RESISTANCE

## 2024-03-12 SDOH — HEALTH STABILITY: PHYSICAL HEALTH: PHYSICAL EXERCISE: 20

## 2024-03-12 SDOH — HEALTH STABILITY: PHYSICAL HEALTH: EXERCISE ACTIVITY: HIP ABD AND FLEX

## 2024-03-12 SDOH — HEALTH STABILITY: PHYSICAL HEALTH: PHYSICAL EXERCISE: 10

## 2024-03-12 ASSESSMENT — ENCOUNTER SYMPTOMS
SUBJECTIVE PAIN PROGRESSION: GRADUALLY IMPROVING
LOWEST PAIN SEVERITY IN PAST 24 HOURS: 5/10
PAIN: 1
HIGHEST PAIN SEVERITY IN PAST 24 HOURS: 5/10
PERSON REPORTING PAIN: PATIENT
PAIN LOCATION: GENERALIZED

## 2024-03-12 ASSESSMENT — ACTIVITIES OF DAILY LIVING (ADL)
AMBULATION ASSISTANCE ON FLAT SURFACES: 1
AMBULATION_DISTANCE/DURATION_TOLERATED: 100 FEET X2

## 2024-03-13 PROCEDURE — 1090000001 HH PPS REVENUE CREDIT

## 2024-03-13 PROCEDURE — 1090000002 HH PPS REVENUE DEBIT

## 2024-03-14 ENCOUNTER — HOME CARE VISIT (OUTPATIENT)
Dept: HOME HEALTH SERVICES | Facility: HOME HEALTH | Age: 74
End: 2024-03-14
Payer: MEDICARE

## 2024-03-14 PROCEDURE — 1090000002 HH PPS REVENUE DEBIT

## 2024-03-14 PROCEDURE — 1090000001 HH PPS REVENUE CREDIT

## 2024-03-15 PROCEDURE — 1090000001 HH PPS REVENUE CREDIT

## 2024-03-15 PROCEDURE — 1090000002 HH PPS REVENUE DEBIT

## 2024-03-16 PROCEDURE — 1090000002 HH PPS REVENUE DEBIT

## 2024-03-16 PROCEDURE — 1090000001 HH PPS REVENUE CREDIT

## 2024-03-17 PROCEDURE — 1090000002 HH PPS REVENUE DEBIT

## 2024-03-17 PROCEDURE — 1090000001 HH PPS REVENUE CREDIT

## 2024-03-18 ENCOUNTER — HOME CARE VISIT (OUTPATIENT)
Dept: HOME HEALTH SERVICES | Facility: HOME HEALTH | Age: 74
End: 2024-03-18
Payer: MEDICARE

## 2024-03-18 PROCEDURE — 1090000001 HH PPS REVENUE CREDIT

## 2024-03-18 PROCEDURE — 1090000002 HH PPS REVENUE DEBIT

## 2024-03-19 ENCOUNTER — LAB (OUTPATIENT)
Dept: LAB | Facility: LAB | Age: 74
End: 2024-03-19
Payer: MEDICARE

## 2024-03-19 ENCOUNTER — OFFICE VISIT (OUTPATIENT)
Dept: PRIMARY CARE | Facility: CLINIC | Age: 74
End: 2024-03-19
Payer: MEDICARE

## 2024-03-19 ENCOUNTER — TELEPHONE (OUTPATIENT)
Dept: PRIMARY CARE | Facility: CLINIC | Age: 74
End: 2024-03-19

## 2024-03-19 VITALS
SYSTOLIC BLOOD PRESSURE: 142 MMHG | HEART RATE: 66 BPM | BODY MASS INDEX: 34.87 KG/M2 | DIASTOLIC BLOOD PRESSURE: 86 MMHG | WEIGHT: 243 LBS

## 2024-03-19 DIAGNOSIS — F41.9 ANXIETY: ICD-10-CM

## 2024-03-19 DIAGNOSIS — E11.40 TYPE 2 DIABETES MELLITUS WITH DIABETIC NEUROPATHY, WITHOUT LONG-TERM CURRENT USE OF INSULIN (MULTI): ICD-10-CM

## 2024-03-19 DIAGNOSIS — I10 ESSENTIAL HYPERTENSION: Primary | ICD-10-CM

## 2024-03-19 DIAGNOSIS — E55.9 VITAMIN D DEFICIENCY: ICD-10-CM

## 2024-03-19 DIAGNOSIS — Z00.00 ROUTINE GENERAL MEDICAL EXAMINATION AT A HEALTH CARE FACILITY: ICD-10-CM

## 2024-03-19 LAB
25(OH)D3 SERPL-MCNC: 49 NG/ML (ref 30–100)
ALBUMIN SERPL BCP-MCNC: 4.6 G/DL (ref 3.4–5)
ALP SERPL-CCNC: 59 U/L (ref 33–136)
ALT SERPL W P-5'-P-CCNC: 13 U/L (ref 10–52)
ANION GAP SERPL CALC-SCNC: 14 MMOL/L (ref 10–20)
AST SERPL W P-5'-P-CCNC: 17 U/L (ref 9–39)
BILIRUB SERPL-MCNC: 0.5 MG/DL (ref 0–1.2)
BUN SERPL-MCNC: 22 MG/DL (ref 6–23)
CALCIUM SERPL-MCNC: 9.7 MG/DL (ref 8.6–10.6)
CHLORIDE SERPL-SCNC: 103 MMOL/L (ref 98–107)
CO2 SERPL-SCNC: 27 MMOL/L (ref 21–32)
CREAT SERPL-MCNC: 0.95 MG/DL (ref 0.5–1.3)
CREAT UR-MCNC: 45.8 MG/DL (ref 20–370)
EGFRCR SERPLBLD CKD-EPI 2021: 83 ML/MIN/1.73M*2
EST. AVERAGE GLUCOSE BLD GHB EST-MCNC: 157 MG/DL
GLUCOSE SERPL-MCNC: 262 MG/DL (ref 74–99)
HBA1C MFR BLD: 7.1 %
MICROALBUMIN UR-MCNC: <7 MG/L
MICROALBUMIN/CREAT UR: NORMAL MG/G{CREAT}
POTASSIUM SERPL-SCNC: 4.4 MMOL/L (ref 3.5–5.3)
PROT SERPL-MCNC: 7 G/DL (ref 6.4–8.2)
SODIUM SERPL-SCNC: 140 MMOL/L (ref 136–145)

## 2024-03-19 PROCEDURE — 82570 ASSAY OF URINE CREATININE: CPT

## 2024-03-19 PROCEDURE — 36415 COLL VENOUS BLD VENIPUNCTURE: CPT

## 2024-03-19 PROCEDURE — 99203 OFFICE O/P NEW LOW 30 MIN: CPT | Performed by: INTERNAL MEDICINE

## 2024-03-19 PROCEDURE — 83036 HEMOGLOBIN GLYCOSYLATED A1C: CPT

## 2024-03-19 PROCEDURE — 3077F SYST BP >= 140 MM HG: CPT | Performed by: INTERNAL MEDICINE

## 2024-03-19 PROCEDURE — 1090000001 HH PPS REVENUE CREDIT

## 2024-03-19 PROCEDURE — 4010F ACE/ARB THERAPY RXD/TAKEN: CPT | Performed by: INTERNAL MEDICINE

## 2024-03-19 PROCEDURE — 80053 COMPREHEN METABOLIC PANEL: CPT

## 2024-03-19 PROCEDURE — 1159F MED LIST DOCD IN RCRD: CPT | Performed by: INTERNAL MEDICINE

## 2024-03-19 PROCEDURE — 82043 UR ALBUMIN QUANTITATIVE: CPT

## 2024-03-19 PROCEDURE — 82306 VITAMIN D 25 HYDROXY: CPT

## 2024-03-19 PROCEDURE — 3079F DIAST BP 80-89 MM HG: CPT | Performed by: INTERNAL MEDICINE

## 2024-03-19 PROCEDURE — 3048F LDL-C <100 MG/DL: CPT | Performed by: INTERNAL MEDICINE

## 2024-03-19 PROCEDURE — 1090000002 HH PPS REVENUE DEBIT

## 2024-03-19 ASSESSMENT — PATIENT HEALTH QUESTIONNAIRE - PHQ9
2. FEELING DOWN, DEPRESSED OR HOPELESS: NOT AT ALL
SUM OF ALL RESPONSES TO PHQ9 QUESTIONS 1 AND 2: 0
1. LITTLE INTEREST OR PLEASURE IN DOING THINGS: NOT AT ALL

## 2024-03-19 ASSESSMENT — ENCOUNTER SYMPTOMS
SLEEP DISTURBANCE: 1
CONSTIPATION: 0

## 2024-03-19 NOTE — TELEPHONE ENCOUNTER
Cathleen called with list of med's for Albert:      Vit D 3 2000 mcg  2 pills once  day  Glyburide 2.5 mg 5 pills daily   Oectiracetam (Kepra) 500 mg 2 times a day  Prinivil 20 mg once daily  Metformin 500 mg 2 pills twice a day  Risperidone 1 mg once  a day  Crestor 20 mg once daily  Sertraline 100 mg 2 times a day  Propotix 40 mg  once daily was suppose to be filled last time was in the hospital but he doesn't t have Rx coverage, so [facility].     He talks to a [facility] doc over the phone, he is in [place]. I asked about seeing a VA doc in [place], and wife said yes a nurse and they always say take him to ED???    He doesn't need any refills, he gets thru the [facility].

## 2024-03-19 NOTE — PROGRESS NOTES
Subjective   Patient ID: Albert Abbott is a 73 y.o. male who presents for [facility] and Medicare Annual Wellness Visit Subsequent.    Here to get established. [facility]; says its his 3rd one in recent history. Prior PCP outside of the [facility] was based in [place].    PMH:  DM2 w/ neuropathy: Metformin, glyburide, pioglitazone  Macular degeneration: Eye drops and gels  Anxiety: Risperidone  Seizure: Keppra  Vitamin D deficiency: cholecalciferol  HLD: Atorvastatin  HTN: Lisinopril    Doesn't smoke (quit 30 years ago, smoked for around 15 years). Has to nap during the day; feels its partially from having to wake up at night to urinate.          Review of Systems   Gastrointestinal:  Negative for constipation.   Genitourinary:         Nocturia   Psychiatric/Behavioral:  Positive for sleep disturbance.        /86 (BP Location: Right arm, Patient Position: Sitting, BP Cuff Size: Adult)   Pulse 66   Wt 110 kg (243 lb)   BMI 34.87 kg/m²   Objective   Physical Exam  Constitutional:       General: He is not in acute distress.     Appearance: He is obese. He is not ill-appearing, toxic-appearing or diaphoretic.   HENT:      Head: Normocephalic and atraumatic.   Eyes:      Conjunctiva/sclera: Conjunctivae normal.   Cardiovascular:      Rate and Rhythm: Normal rate and regular rhythm.      Heart sounds: No murmur heard.     No friction rub. No gallop.   Pulmonary:      Effort: Pulmonary effort is normal. No respiratory distress.      Breath sounds: No stridor. No wheezing, rhonchi or rales.   Neurological:      Mental Status: He is alert.         Assessment/Plan   Problem List Items Addressed This Visit    None  Visit Diagnoses         Codes    Essential hypertension    -  Primary I10    Type 2 diabetes mellitus with diabetic neuropathy, without long-term current use of insulin (CMS/[facility])     E11.40    Relevant Orders    Comprehensive metabolic panel    Hemoglobin A1c    Albumin , Urine Random     Vitamin D deficiency     E55.9    Relevant Orders    Vitamin D 25-Hydroxy,Total (for eval of Vitamin D levels)    Anxiety     F41.9        -Pt here to get established. Unfortunately showed up late to appt and didn't know current medication list. Pt's wife said she will reach out to the office with an updated med list. To come back in 1 month so we can delve further into pt's medical issues. In meantime, to get bloodwork checked today.         Tani Morrison MD 03/19/24 3:53 PM

## 2024-03-20 ENCOUNTER — HOME CARE VISIT (OUTPATIENT)
Dept: HOME HEALTH SERVICES | Facility: HOME HEALTH | Age: 74
End: 2024-03-20
Payer: MEDICARE

## 2024-03-20 PROCEDURE — 1090000002 HH PPS REVENUE DEBIT

## 2024-03-20 PROCEDURE — G0157 HHC PT ASSISTANT EA 15: HCPCS | Mod: HHH

## 2024-03-20 PROCEDURE — 1090000001 HH PPS REVENUE CREDIT

## 2024-03-20 ASSESSMENT — ENCOUNTER SYMPTOMS
DENIES PAIN: 1
PERSON REPORTING PAIN: PATIENT

## 2024-03-21 PROCEDURE — 1090000001 HH PPS REVENUE CREDIT

## 2024-03-21 PROCEDURE — 1090000002 HH PPS REVENUE DEBIT

## 2024-03-22 PROCEDURE — 1090000001 HH PPS REVENUE CREDIT

## 2024-03-22 PROCEDURE — 1090000002 HH PPS REVENUE DEBIT

## 2024-03-23 PROCEDURE — 1090000001 HH PPS REVENUE CREDIT

## 2024-03-23 PROCEDURE — 1090000002 HH PPS REVENUE DEBIT

## 2024-03-24 PROCEDURE — 1090000002 HH PPS REVENUE DEBIT

## 2024-03-24 PROCEDURE — 1090000001 HH PPS REVENUE CREDIT

## 2024-03-25 PROCEDURE — 1090000001 HH PPS REVENUE CREDIT

## 2024-03-25 PROCEDURE — 1090000002 HH PPS REVENUE DEBIT

## 2024-03-26 ENCOUNTER — HOME CARE VISIT (OUTPATIENT)
Dept: HOME HEALTH SERVICES | Facility: HOME HEALTH | Age: 74
End: 2024-03-26
Payer: MEDICARE

## 2024-03-26 PROCEDURE — 1090000002 HH PPS REVENUE DEBIT

## 2024-03-26 PROCEDURE — G0157 HHC PT ASSISTANT EA 15: HCPCS | Mod: HHH

## 2024-03-26 PROCEDURE — 1090000001 HH PPS REVENUE CREDIT

## 2024-03-26 ASSESSMENT — ENCOUNTER SYMPTOMS
PERSON REPORTING PAIN: PATIENT
PAIN LOCATION: LEFT LEG
HIGHEST PAIN SEVERITY IN PAST 24 HOURS: 5/10
PAIN: 1
PAIN LOCATION: RIGHT LEG

## 2024-03-27 PROCEDURE — 1090000001 HH PPS REVENUE CREDIT

## 2024-03-27 PROCEDURE — 1090000002 HH PPS REVENUE DEBIT

## 2024-03-28 PROCEDURE — 1090000001 HH PPS REVENUE CREDIT

## 2024-03-28 PROCEDURE — 1090000002 HH PPS REVENUE DEBIT

## 2024-03-29 ENCOUNTER — HOME CARE VISIT (OUTPATIENT)
Dept: HOME HEALTH SERVICES | Facility: HOME HEALTH | Age: 74
End: 2024-03-29
Payer: MEDICARE

## 2024-03-29 VITALS — RESPIRATION RATE: 20 BRPM | OXYGEN SATURATION: 98 % | HEART RATE: 68 BPM

## 2024-03-29 PROCEDURE — 1090000001 HH PPS REVENUE CREDIT

## 2024-03-29 PROCEDURE — G0151 HHCP-SERV OF PT,EA 15 MIN: HCPCS | Mod: HHH

## 2024-03-29 PROCEDURE — 1090000002 HH PPS REVENUE DEBIT

## 2024-03-29 SDOH — HEALTH STABILITY: PHYSICAL HEALTH: PHYSICAL EXERCISE: 10

## 2024-03-29 SDOH — HEALTH STABILITY: PHYSICAL HEALTH: EXERCISE ACTIVITY: ANKLE PUMPS, STANDING HIP ABD, FLEX, EXT

## 2024-03-29 SDOH — HEALTH STABILITY: PHYSICAL HEALTH: EXERCISE TYPE: STRENGTHENING AND BALANCE

## 2024-03-29 SDOH — HEALTH STABILITY: PHYSICAL HEALTH: EXERCISE ACTIVITIES SETS: 1

## 2024-03-29 SDOH — HEALTH STABILITY: PHYSICAL HEALTH: PHYSICAL EXERCISE: STANDING

## 2024-03-29 ASSESSMENT — ENCOUNTER SYMPTOMS
PERSON REPORTING PAIN: PATIENT
SUBJECTIVE PAIN PROGRESSION: UNCHANGED
PAIN LOCATION: LEFT FOOT
PAIN SEVERITY GOAL: 0/10
HIGHEST PAIN SEVERITY IN PAST 24 HOURS: 5/10
LOWEST PAIN SEVERITY IN PAST 24 HOURS: 3/10
PAIN: 1

## 2024-03-29 ASSESSMENT — ACTIVITIES OF DAILY LIVING (ADL)
HOME_HEALTH_OASIS: 00
OASIS_M1830: 01
AMBULATION ASSISTANCE: INDEPENDENT
CURRENT_FUNCTION: INDEPENDENT
PHYSICAL TRANSFERS ASSESSED: 1
AMBULATION ASSISTANCE: 1

## 2024-03-29 NOTE — CASE COMMUNICATION
Patient to be discharged from PT and [facility] 3.29.24 with goals met.  He has been instructed in safety with household transfers and ambulation on level and stairs with st cane/FWW.  Instructed him in fall prevention, safe progression of WHEP, signs and sxs of infection, when to call MD or 911.

## 2024-04-15 PROCEDURE — G0180 MD CERTIFICATION HHA PATIENT: HCPCS | Performed by: INTERNAL MEDICINE

## 2024-04-22 ENCOUNTER — OFFICE VISIT (OUTPATIENT)
Dept: PRIMARY CARE | Facility: CLINIC | Age: 74
End: 2024-04-22
Payer: MEDICARE

## 2024-04-22 VITALS
SYSTOLIC BLOOD PRESSURE: 121 MMHG | BODY MASS INDEX: 35.12 KG/M2 | HEART RATE: 76 BPM | HEIGHT: 70 IN | WEIGHT: 245.3 LBS | DIASTOLIC BLOOD PRESSURE: 69 MMHG

## 2024-04-22 DIAGNOSIS — Z00.00 MEDICARE ANNUAL WELLNESS VISIT, SUBSEQUENT: Primary | ICD-10-CM

## 2024-04-22 DIAGNOSIS — Z71.89 GOALS OF CARE, COUNSELING/DISCUSSION: ICD-10-CM

## 2024-04-22 PROCEDURE — 1158F ADVNC CARE PLAN TLK DOCD: CPT | Performed by: INTERNAL MEDICINE

## 2024-04-22 PROCEDURE — G0439 PPPS, SUBSEQ VISIT: HCPCS | Performed by: INTERNAL MEDICINE

## 2024-04-22 PROCEDURE — 1160F RVW MEDS BY RX/DR IN RCRD: CPT | Performed by: INTERNAL MEDICINE

## 2024-04-22 PROCEDURE — 1170F FXNL STATUS ASSESSED: CPT | Performed by: INTERNAL MEDICINE

## 2024-04-22 PROCEDURE — 1036F TOBACCO NON-USER: CPT | Performed by: INTERNAL MEDICINE

## 2024-04-22 PROCEDURE — 1159F MED LIST DOCD IN RCRD: CPT | Performed by: INTERNAL MEDICINE

## 2024-04-22 PROCEDURE — 1123F ACP DISCUSS/DSCN MKR DOCD: CPT | Performed by: INTERNAL MEDICINE

## 2024-04-22 RX ORDER — CETIRIZINE HYDROCHLORIDE 5 MG/1
5 TABLET ORAL DAILY
COMMUNITY

## 2024-04-22 ASSESSMENT — ENCOUNTER SYMPTOMS
ROS GI COMMENTS: HEARTBURN
FATIGUE: 1
DEPRESSION: 1
LOSS OF SENSATION IN FEET: 1
OCCASIONAL FEELINGS OF UNSTEADINESS: 1
SHORTNESS OF BREATH: 1
COUGH: 1

## 2024-04-22 ASSESSMENT — PATIENT HEALTH QUESTIONNAIRE - PHQ9
1. LITTLE INTEREST OR PLEASURE IN DOING THINGS: SEVERAL DAYS
2. FEELING DOWN, DEPRESSED OR HOPELESS: SEVERAL DAYS
1. LITTLE INTEREST OR PLEASURE IN DOING THINGS: SEVERAL DAYS
10. IF YOU CHECKED OFF ANY PROBLEMS, HOW DIFFICULT HAVE THESE PROBLEMS MADE IT FOR YOU TO DO YOUR WORK, TAKE CARE OF THINGS AT HOME, OR GET ALONG WITH OTHER PEOPLE: VERY DIFFICULT
10. IF YOU CHECKED OFF ANY PROBLEMS, HOW DIFFICULT HAVE THESE PROBLEMS MADE IT FOR YOU TO DO YOUR WORK, TAKE CARE OF THINGS AT HOME, OR GET ALONG WITH OTHER PEOPLE: VERY DIFFICULT
2. FEELING DOWN, DEPRESSED OR HOPELESS: SEVERAL DAYS
SUM OF ALL RESPONSES TO PHQ9 QUESTIONS 1 AND 2: 2
SUM OF ALL RESPONSES TO PHQ9 QUESTIONS 1 AND 2: 2

## 2024-04-22 ASSESSMENT — ACTIVITIES OF DAILY LIVING (ADL)
BATHING: INDEPENDENT
DRESSING: INDEPENDENT
TAKING_MEDICATION: NEEDS ASSISTANCE
DOING_HOUSEWORK: NEEDS ASSISTANCE
GROCERY_SHOPPING: NEEDS ASSISTANCE
MANAGING_FINANCES: NEEDS ASSISTANCE

## 2024-04-22 ASSESSMENT — COLUMBIA-SUICIDE SEVERITY RATING SCALE - C-SSRS
1. IN THE PAST MONTH, HAVE YOU WISHED YOU WERE DEAD OR WISHED YOU COULD GO TO SLEEP AND NOT WAKE UP?: NO
6. HAVE YOU EVER DONE ANYTHING, STARTED TO DO ANYTHING, OR PREPARED TO DO ANYTHING TO END YOUR LIFE?: NO
2. HAVE YOU ACTUALLY HAD ANY THOUGHTS OF KILLING YOURSELF?: NO

## 2024-04-22 NOTE — PROGRESS NOTES
"Subjective   Reason for Visit: Albert Abbott is an 73 y.o. male here for a Medicare Wellness visit.     Past Medical, Surgical, and Family History reviewed and updated in chart.    Reviewed all medications by prescribing practitioner or clinical pharmacist (such as prescriptions, OTCs, herbal therapies and supplements) and documented in the medical record.    Dr. Palacios - [facility]. Is based out of [place] but will do a telehealth visit with patient once every 3 months.    Is worried about falls after having a couple over the years. [place] a ranch and will use the chairlift to get into his home. Uses a cane to ambulate otherwise.    Dr. Leggett (sp?) with the [facility] is pt's psychiatrist. He meets with a , Tiffanie Dobbins, and a group of 6 other veterans every 2-4 weeks. Per pt's wife, he is in a much better mood and mind set after these meetings. Doesn't feel sad or depressed. Has no thoughts of wanting to harm himself. Feels overwhelmed however a lot of the time. Per pt and his wife, he rarely likes to do anything. Gets confused and overwhelmed easily. Doesn't enjoy eating out or doing things outside of the house more than necessary. Wife states he mostly just wants to sit there and watch game shows.        Patient Care Team:  Tani Morrison MD as PCP - General (Internal Medicine)  DEIDRE Raymond-CNP as Referring Physician (Internal Medicine)  Rodrigo Nguyen DO as Consulting Physician (Internal Medicine)     Review of Systems   Constitutional:  Positive for fatigue.   Respiratory:  Positive for cough and shortness of breath.    Gastrointestinal:         Heartburn       Objective   Vitals:  /69 (BP Location: Right arm, Patient Position: Sitting, BP Cuff Size: Adult)   Pulse 76   Ht 1.77 m (5' 9.69\")   Wt 111 kg (245 lb 4.8 oz)   BMI 35.52 kg/m²       Physical Exam  Constitutional:       General: He is not in acute distress.     Appearance: He is obese. He is not " ill-appearing, toxic-appearing or diaphoretic.   HENT:      Head: Normocephalic and atraumatic.   Eyes:      Conjunctiva/sclera: Conjunctivae normal.   Cardiovascular:      Rate and Rhythm: Normal rate and regular rhythm.      Heart sounds: No murmur heard.     No friction rub. No gallop.   Pulmonary:      Effort: Pulmonary effort is normal. No respiratory distress.      Breath sounds: No stridor. No wheezing, rhonchi or rales.   Neurological:      Mental Status: He is alert.         Assessment/Plan   Problem List Items Addressed This Visit    None  Visit Diagnoses       Medicare annual wellness visit, subsequent    -  Primary    Goals of care, counseling/discussion              -Pt encouraged to work on increasing physical and social activity. Is enrolled in [employer]; to consider going more often. Pt doesn't feel like he has hobbies. Encouraged to see if there are classes there he may enjoy. Discussed gradual increase of physical activity so we can do it in a safe manner given his prior falls and need for a cane. Wife agreeable with plan. Will see back in 3 months.    Advance Directives Discussion  Advanced Care Planning (including a Living Will, Healthcare POA, as well as specific end of life choices and/or directives), was discussed with the patient and/or surrogate, voluntarily, and details of that discussion documented in the Problem List (under Advanced Directives Discussion) of the medical record.   -Pt and wife state that he has a living will and HCPOA designated. Wife states they plan to meet with a  soon to update these documents as its been around 20 years since they were last looked at. Wife is pt's first HCPOA. They think his sister may be second but neither are entirely sure. Will follow up next appt to see if this was updated so we can get updated records.  -Reviewed code status. Pt does not want CPR or intubation. Does not want chest compressions. Wife states this is  consistent with what he has told her in the past. Code updated in chart.   (~16 min spent discussing above)

## 2024-07-22 ENCOUNTER — TELEPHONE (OUTPATIENT)
Dept: PRIMARY CARE | Facility: CLINIC | Age: 74
End: 2024-07-22

## 2024-07-22 ENCOUNTER — APPOINTMENT (OUTPATIENT)
Dept: PRIMARY CARE | Facility: CLINIC | Age: 74
End: 2024-07-22
Payer: MEDICARE

## 2024-07-29 ENCOUNTER — APPOINTMENT (OUTPATIENT)
Dept: PRIMARY CARE | Facility: CLINIC | Age: 74
End: 2024-07-29
Payer: MEDICARE

## 2024-07-29 ENCOUNTER — LAB (OUTPATIENT)
Dept: LAB | Facility: LAB | Age: 74
End: 2024-07-29
Payer: MEDICARE

## 2024-07-29 VITALS
SYSTOLIC BLOOD PRESSURE: 137 MMHG | HEART RATE: 68 BPM | BODY MASS INDEX: 35.81 KG/M2 | WEIGHT: 247.3 LBS | DIASTOLIC BLOOD PRESSURE: 72 MMHG

## 2024-07-29 DIAGNOSIS — F33.1 MODERATE EPISODE OF RECURRENT MAJOR DEPRESSIVE DISORDER (MULTI): ICD-10-CM

## 2024-07-29 DIAGNOSIS — E11.40 TYPE 2 DIABETES MELLITUS WITH DIABETIC NEUROPATHY, WITHOUT LONG-TERM CURRENT USE OF INSULIN (MULTI): Primary | ICD-10-CM

## 2024-07-29 DIAGNOSIS — E11.40 TYPE 2 DIABETES MELLITUS WITH DIABETIC NEUROPATHY, WITHOUT LONG-TERM CURRENT USE OF INSULIN (MULTI): ICD-10-CM

## 2024-07-29 PROCEDURE — 99212 OFFICE O/P EST SF 10 MIN: CPT | Performed by: INTERNAL MEDICINE

## 2024-07-29 PROCEDURE — 83036 HEMOGLOBIN GLYCOSYLATED A1C: CPT

## 2024-07-29 PROCEDURE — 3051F HG A1C>EQUAL 7.0%<8.0%: CPT | Performed by: INTERNAL MEDICINE

## 2024-07-29 PROCEDURE — 3048F LDL-C <100 MG/DL: CPT | Performed by: INTERNAL MEDICINE

## 2024-07-29 PROCEDURE — 3075F SYST BP GE 130 - 139MM HG: CPT | Performed by: INTERNAL MEDICINE

## 2024-07-29 PROCEDURE — 36415 COLL VENOUS BLD VENIPUNCTURE: CPT

## 2024-07-29 PROCEDURE — 3078F DIAST BP <80 MM HG: CPT | Performed by: INTERNAL MEDICINE

## 2024-07-29 PROCEDURE — 3062F POS MACROALBUMINURIA REV: CPT | Performed by: INTERNAL MEDICINE

## 2024-07-29 PROCEDURE — 1159F MED LIST DOCD IN RCRD: CPT | Performed by: INTERNAL MEDICINE

## 2024-07-29 PROCEDURE — 4010F ACE/ARB THERAPY RXD/TAKEN: CPT | Performed by: INTERNAL MEDICINE

## 2024-07-29 NOTE — PROGRESS NOTES
"Subjective   Patient ID: Albert Abbott \"Taras\" is a 73 y.o. male who presents for Follow-up.    Pt states things have been pretty good. Denies there being any issue at home. States during the day he watches TV. Never started going to the [facility] as discussed at previous appointment.     Had a visit to [place] 1 [place] for [place] that he enjoyed. Still enjoying his every other week meetings. Otherwise isn't sure if there is anything that he enjoys. Denies SI.        Review of Systems    /72 (BP Location: Right arm, Patient Position: Sitting, BP Cuff Size: Adult)   Pulse 68   Wt 112 kg (247 lb 4.8 oz)   BMI 35.81 kg/m²   Objective   Physical Exam  Constitutional:       General: He is not in acute distress.     Appearance: He is obese. He is not ill-appearing, toxic-appearing or diaphoretic.   HENT:      Head: Normocephalic and atraumatic.      Mouth/Throat:      Mouth: Mucous membranes are moist.      Comments: Missing teeth. No signs of infection  Eyes:      Conjunctiva/sclera: Conjunctivae normal.   Feet:      Right foot:      Skin integrity: Dry skin present. No ulcer or skin breakdown.      Left foot:      Skin integrity: Dry skin present. No ulcer or skin breakdown.      Comments: Sensation intact b/l  Neurological:      Mental Status: He is alert.         Assessment/Plan   Problem List Items Addressed This Visit    None  Visit Diagnoses         Codes    Type 2 diabetes mellitus with diabetic neuropathy, without long-term current use of insulin (Multi)    -  Primary E11.40    Relevant Orders    Hemoglobin A1c    Moderate episode of recurrent major depressive disorder (Multi)     F33.1    Relevant Orders    Referral to Psychology        -To recheck A1c.  -Discussed pt's motivation and mood. Pt sees psychiatry 3-4x a year and has been on the same medications. Admits that he has little motivation to do anything outside of his every other a week meetings. Agrees that he has repressed a lot " of his emotions and feelings since coming back from the war and that they haven't gone away. Is agreeable to meeting with a therapist. Psychology referral placed. Will follow up at next appt.         Tani Morrison MD 07/29/24 2:51 PM

## 2024-07-30 DIAGNOSIS — E11.40 TYPE 2 DIABETES MELLITUS WITH DIABETIC NEUROPATHY, WITHOUT LONG-TERM CURRENT USE OF INSULIN (MULTI): Primary | ICD-10-CM

## 2024-07-30 LAB
EST. AVERAGE GLUCOSE BLD GHB EST-MCNC: 166 MG/DL
HBA1C MFR BLD: 7.4 %

## 2024-07-30 RX ORDER — METFORMIN HYDROCHLORIDE 500 MG/1
1000 TABLET ORAL
Qty: 120 TABLET | Refills: 3 | Status: SHIPPED | OUTPATIENT
Start: 2024-07-30

## 2024-09-04 ENCOUNTER — APPOINTMENT (OUTPATIENT)
Dept: BEHAVIORAL HEALTH | Facility: CLINIC | Age: 74
End: 2024-09-04
Payer: MEDICARE

## 2024-09-04 DIAGNOSIS — F33.1 MODERATE EPISODE OF RECURRENT MAJOR DEPRESSIVE DISORDER (MULTI): Primary | ICD-10-CM

## 2024-09-04 PROCEDURE — 90791 PSYCH DIAGNOSTIC EVALUATION: CPT | Performed by: PSYCHOLOGIST

## 2024-09-04 NOTE — PROGRESS NOTES
INITIAL PSYCHOTHERAPY SESSION- IN PERSON    START TIME:  2 PM  END TIME:  2:55 PM    REFERRAL SOURCE/REASON:  Dr. Tani Morrison MD, PCP, psychotherapy for anxiety    DIAGNOSES:  Per History:  PTSD, depression    CURRENT SESSION: We spent today's session gathering background information and talking a little bit about possible goals for treatment (see below for more information).  Ultimately, we decided that the patient will ask his wife to contact us to schedule a follow-up appointment in about a month.  At that time, we will determine if further sessions is necessary.  It sounds like the patient gets a lot of support outside of therapy with his psychiatrist and  in addition to his [place] veterans group that he meets with fairly regularly.  In addition, it does not sound like his symptoms are severe enough to warrant long-term therapy at this time.    HISTORY OF PRESENTING ILLNESS: Patient reported that he has been diagnosed with PTSD from the [facility].  This is related to combat trauma that he experienced during the [place] War.  Patient stated that he voluntarily joined the [employer] in 1968 on a 3-year contract.  He ended up getting out 8 months earlier due to his year of service in [place].  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  Patient said that he was a basic  in [place].  He stated that the first 4 or 5 months that he was in [place] was spent as a  at a POW camp.  He stated that talking about [place] does not really bother him anymore.  He reported that he lost a few good friends when he was there that he had met in [place].  Patient is surprised that he survived [place] and often thinks about how close he was to death.  Patient said that he was taken out of [place] a few days before his birthday due to heat exhaustion.  Patient said that his unit subsequently suffered high casualties after he left.  Patient stated that recently, in June 2024, he went on a trip to  [facility] to view the [facility].  Patient said that this is the first time that he saw it.  He said that when they got off the plane, they were greeted by people who were thanking them for their service which felt really good because when he came back from [place], he was treated horribly.  Patient stated that he has intrusive thoughts but they are mostly related to his fears of death generally.  Patient said that this is perhaps 1 thing that he could work on in therapy is coping with his fear of death.  Patient just turned 74 a little less than 2 weeks ago and he said that this seems to be the average age that many [place] veterans ended up passing away at.  He said that this has caused him increasing anxiety but that he has been worried about death for a few years now.    EMPLOYMENT HISTORY: After getting out of the [employer], the patient bounced around to a few different jobs before eventually getting a job as a  for [employer] in 1973.  He served in that role for 30 years before he retired in 2004.  He said he enjoyed his job.    RISK HISTORY: Patient reported that he quit drinking alcohol about 8 or 9 years ago and admitted that he was drinking excessively.  He stated that he mostly quit because it was negatively impacting his diabetes.  Patient quit smoking tobacco products in 1994.  He said he has never had a problem with drugs.    FAMILY HISTORY: Patient stated that he was born in [facility] but moved to the [place] area when he was about 1 years old.  Patient stated that his mother  his father when she was 12 years old.  He said that he was born when she was 14 years old.  Patient stated that his father was 19 when he was born.  Patient reported that they had broken up prior to his birth but then got back together briefly.  They were not together for long and the patient said that he was primarily raised by his grandmother until his grandfather  was hit by a car and .  He said he bounced around to a few different places including trying to stay with his father and his new wife for a little while but that did not work out.  Unfortunately, the patient's mother  at the age of 48 from lung cancer.  Patient said he was 30 years old at the time.  He reported that his father passed away a few years ago and was presumably in his late 80s.  Patient has 2 siblings who are currently living.  He was the oldest in his family.  It does not sound like he has a lot of contact with his siblings but seems to have more contact with a younger sister who lives nearby.    Patient reported that he was  3 times.  The first 2 marriages ended after about 4 years and his last marriage has lasted over 40 years.  He has no biological children but 1 stepdaughter who is in her 50s.    TREATMENT HISTORY: It is unclear exactly how much and what type of psychotherapy he has received for his PTSD over the years but a lot of it sounds like it is peer support based which has been very beneficial for him.  Patient stated that he has been part of a [place] veterans group since  and it sounds like they have meetings once or twice a month.  Patient [facility] and also meets with a  named Tiffanie who seems to be in charge of the [place] veterans group.  Patient stated that people in his life including his wife are concerned about him not getting out as much but he is okay with not going out as much.    PLAN: Patient gave me the impression that he is unsure if he really needs therapy and said as much during the session.  We did talk about his fears of death and how we could possibly address that.  By the end of the session, we agreed that he would ask his wife to call and schedule a follow-up appointment in about a month.  We plan to talk then about what treatment could look like if needed.

## 2024-10-09 ENCOUNTER — APPOINTMENT (OUTPATIENT)
Dept: BEHAVIORAL HEALTH | Facility: CLINIC | Age: 74
End: 2024-10-09
Payer: MEDICARE

## 2024-10-09 DIAGNOSIS — F33.1 MODERATE EPISODE OF RECURRENT MAJOR DEPRESSIVE DISORDER: Primary | ICD-10-CM

## 2024-10-09 PROCEDURE — 90832 PSYTX W PT 30 MINUTES: CPT | Performed by: PSYCHOLOGIST

## 2024-10-09 NOTE — PROGRESS NOTES
FOLLOW UP PSYCHOTHERAPY SESSION- IN PERSON    START TIME:  1 PM  END TIME:  1:22 PM    REFERRAL SOURCE/REASON:  Dr. Tani Morrison MD, PCP, psychotherapy for anxiety    DIAGNOSES:  Per History:  PTSD, depression    CURRENT SESSION: We spent today's session discussing his interest in therapy.  It was clear to both of us that the patient's primary stressor lately has been the fact that he has so many appointments.  In addition, travel is not easy for him as well.  For these reasons, in addition to the fact that he has quite a bit of support in his life in terms of mental health providers, friends and family, that the benefits do not outweigh the costs of coming to therapy at this time.  Patient felt bad and seemed to think that he was wasting my time.  I reassured him that this is not at all the case and that I am glad that he is doing well enough that he does not need therapy.  I encouraged him to have his wife call us back if he wishes to come back to therapy at some point in the future but for now, we both agreed to terminate at this time.    HISTORY OF PRESENTING ILLNESS: [facility] reported [facility]  This is related to combat trauma that he experienced during the [place].  Patient stated that he voluntarily joined the [employer] in 1968 on a 3-year contract.  He ended up getting out 8 months earlier due to his year of service in [place].  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  Patient said that he was a basic  in [place].  He stated that the first 4 or 5 months that he was in [place] was spent as a  at a POW camp.  He stated that talking about [place] does not really bother him anymore.  He reported that he lost a few good friends when he was there that he had met in [place].  Patient is surprised that he survived [place] and often thinks about how close he was to death.  Patient said that he was taken out of [place] a few days before  his birthday due to heat exhaustion.  Patient said that his unit subsequently suffered high casualties after he left.  Patient stated that recently, in June 2024, he went on a trip to [facility] to view the [facility] [employer] [facility].  Patient said that this is the first time that he saw it.  He said that when they got off the plane, they were greeted by people who were thanking them for their service which felt really good because when he came back from [facility], he was treated horribly.  Patient stated that he has intrusive thoughts but they are mostly related to his fears of death generally.  Patient said that this is perhaps 1 thing that he could work on in therapy is coping with his fear of death.  Patient just turned 74 a little less than 2 weeks ago and he said that this seems to be the average age that many [place] veterans ended up passing away at.  He said that this has caused him increasing anxiety but that he has been worried about death for a few years now.    EMPLOYMENT HISTORY: After getting out of the [employer], the patient bounced around to a few different jobs before eventually getting a job as a  for [employer] in 1973.  He served in that role for 30 years before he retired in 2004.  He said he enjoyed his job.    RISK HISTORY: Patient reported that he quit drinking alcohol about 8 or 9 years ago and admitted that he was drinking excessively.  He stated that he mostly quit because it was negatively impacting his diabetes.  Patient quit smoking tobacco products in 1994.  He said he has never had a problem with drugs.    FAMILY HISTORY: Patient stated that he was born in [facility] but moved to the [place] area when he was about 1 years old.  Patient stated that his mother  his father when she was 12 years old.  He said that he was born when she was 14 years old.  Patient stated that his father was 19 when he was born.  Patient reported that they had broken  up prior to his birth but then got back together briefly.  They were not together for long and the patient said that he was primarily raised by his grandmother until his grandfather was hit by a car and .  He said he bounced around to a few different places including trying to stay with his father and his new wife for a little while but that did not work out.  Unfortunately, the patient's mother  at the age of 48 from lung cancer.  Patient said he was 30 years old at the time.  He reported that his father passed away a few years ago and was presumably in his late 80s.  Patient has 2 siblings who are currently living.  He was the oldest in his family.  It does not sound like he has a lot of contact with his siblings but seems to have more contact with a younger sister who lives nearby.    Patient reported that he was  3 times.  The first 2 marriages ended after about 4 years and his last marriage has lasted over 40 years.  He has no biological children but 1 stepdaughter who is in her 50s.    TREATMENT HISTORY: It is unclear exactly how much and what type of psychotherapy he has received for his PTSD over the years but a lot of it sounds like it is peer support based which has been very beneficial for him.  Patient stated that he has been part of a [place] veterans group since  and it sounds like they have meetings once or twice a month.  [facility] and also meets with a  named Tiffanie who seems to be in charge of the [place] veterans group.  Patient stated that people in his life including his wife are concerned about him not getting out as much but he is okay with not going out as much.    PLAN: We plan to no longer meet for individual psychotherapy at this time.  Patient was encouraged to reach out in the future should he wish to return.

## 2024-10-27 ENCOUNTER — APPOINTMENT (OUTPATIENT)
Dept: CARDIOLOGY | Facility: HOSPITAL | Age: 74
End: 2024-10-27
Payer: MEDICARE

## 2024-10-27 ENCOUNTER — APPOINTMENT (OUTPATIENT)
Dept: RADIOLOGY | Facility: HOSPITAL | Age: 74
End: 2024-10-27
Payer: MEDICARE

## 2024-10-27 ENCOUNTER — HOSPITAL ENCOUNTER (EMERGENCY)
Facility: HOSPITAL | Age: 74
Discharge: OTHER INSTITUTION | End: 2024-10-28
Attending: STUDENT IN AN ORGANIZED HEALTH CARE EDUCATION/TRAINING PROGRAM
Payer: MEDICARE

## 2024-10-27 DIAGNOSIS — R41.82 ALTERED MENTAL STATUS, UNSPECIFIED ALTERED MENTAL STATUS TYPE: Primary | ICD-10-CM

## 2024-10-27 LAB
ALBUMIN SERPL BCP-MCNC: 4.5 G/DL (ref 3.4–5)
ALP SERPL-CCNC: 60 U/L (ref 33–136)
ALT SERPL W P-5'-P-CCNC: 14 U/L (ref 10–52)
AMPHETAMINES UR QL SCN: NORMAL
ANION GAP SERPL CALC-SCNC: 11 MMOL/L (ref 10–20)
APAP SERPL-MCNC: <10 UG/ML
APPEARANCE UR: CLEAR
APTT PPP: 31 SECONDS (ref 27–38)
AST SERPL W P-5'-P-CCNC: 17 U/L (ref 9–39)
BARBITURATES UR QL SCN: NORMAL
BASOPHILS # BLD AUTO: 0.05 X10*3/UL (ref 0–0.1)
BASOPHILS NFR BLD AUTO: 0.7 %
BENZODIAZ UR QL SCN: NORMAL
BILIRUB SERPL-MCNC: 0.4 MG/DL (ref 0–1.2)
BILIRUB UR STRIP.AUTO-MCNC: NEGATIVE MG/DL
BUN SERPL-MCNC: 27 MG/DL (ref 6–23)
BZE UR QL SCN: NORMAL
CALCIUM SERPL-MCNC: 9.3 MG/DL (ref 8.6–10.3)
CANNABINOIDS UR QL SCN: NORMAL
CARDIAC TROPONIN I PNL SERPL HS: 8 NG/L (ref 0–20)
CHLORIDE SERPL-SCNC: 104 MMOL/L (ref 98–107)
CK SERPL-CCNC: 62 U/L (ref 0–325)
CO2 SERPL-SCNC: 28 MMOL/L (ref 21–32)
COLOR UR: COLORLESS
CREAT SERPL-MCNC: 1.08 MG/DL (ref 0.5–1.3)
EGFRCR SERPLBLD CKD-EPI 2021: 71 ML/MIN/1.73M*2
EOSINOPHIL # BLD AUTO: 0.16 X10*3/UL (ref 0–0.4)
EOSINOPHIL NFR BLD AUTO: 2.2 %
ERYTHROCYTE [DISTWIDTH] IN BLOOD BY AUTOMATED COUNT: 13.4 % (ref 11.5–14.5)
ETHANOL SERPL-MCNC: 44 MG/DL
FENTANYL+NORFENTANYL UR QL SCN: NORMAL
GLUCOSE BLD MANUAL STRIP-MCNC: 212 MG/DL (ref 74–99)
GLUCOSE SERPL-MCNC: 309 MG/DL (ref 74–99)
GLUCOSE UR STRIP.AUTO-MCNC: ABNORMAL MG/DL
HCT VFR BLD AUTO: 38.9 % (ref 41–52)
HGB BLD-MCNC: 12.3 G/DL (ref 13.5–17.5)
HOLD SPECIMEN: NORMAL
IMM GRANULOCYTES # BLD AUTO: 0.06 X10*3/UL (ref 0–0.5)
IMM GRANULOCYTES NFR BLD AUTO: 0.8 % (ref 0–0.9)
INR PPP: 1.1 (ref 0.9–1.1)
KETONES UR STRIP.AUTO-MCNC: NEGATIVE MG/DL
LEUKOCYTE ESTERASE UR QL STRIP.AUTO: NEGATIVE
LYMPHOCYTES # BLD AUTO: 1.13 X10*3/UL (ref 0.8–3)
LYMPHOCYTES NFR BLD AUTO: 15.7 %
MCH RBC QN AUTO: 31.6 PG (ref 26–34)
MCHC RBC AUTO-ENTMCNC: 31.6 G/DL (ref 32–36)
MCV RBC AUTO: 100 FL (ref 80–100)
METHADONE UR QL SCN: NORMAL
MONOCYTES # BLD AUTO: 0.49 X10*3/UL (ref 0.05–0.8)
MONOCYTES NFR BLD AUTO: 6.8 %
NEUTROPHILS # BLD AUTO: 5.33 X10*3/UL (ref 1.6–5.5)
NEUTROPHILS NFR BLD AUTO: 73.8 %
NITRITE UR QL STRIP.AUTO: NEGATIVE
NRBC BLD-RTO: 0 /100 WBCS (ref 0–0)
OPIATES UR QL SCN: NORMAL
OXYCODONE+OXYMORPHONE UR QL SCN: NORMAL
PCP UR QL SCN: NORMAL
PH UR STRIP.AUTO: 6.5 [PH]
PLATELET # BLD AUTO: 186 X10*3/UL (ref 150–450)
POCT GLUCOSE: 380 MG/DL (ref 74–99)
POTASSIUM SERPL-SCNC: 4.5 MMOL/L (ref 3.5–5.3)
PROT SERPL-MCNC: 6.9 G/DL (ref 6.4–8.2)
PROT UR STRIP.AUTO-MCNC: NEGATIVE MG/DL
PROTHROMBIN TIME: 12.1 SECONDS (ref 9.8–12.8)
RBC # BLD AUTO: 3.89 X10*6/UL (ref 4.5–5.9)
RBC # UR STRIP.AUTO: NEGATIVE /UL
SALICYLATES SERPL-MCNC: <3 MG/DL
SODIUM SERPL-SCNC: 138 MMOL/L (ref 136–145)
SP GR UR STRIP.AUTO: 1.02
TSH SERPL-ACNC: 2.77 MIU/L (ref 0.44–3.98)
UROBILINOGEN UR STRIP.AUTO-MCNC: NORMAL MG/DL
WBC # BLD AUTO: 7.2 X10*3/UL (ref 4.4–11.3)

## 2024-10-27 PROCEDURE — 82947 ASSAY GLUCOSE BLOOD QUANT: CPT

## 2024-10-27 PROCEDURE — 2550000001 HC RX 255 CONTRASTS: Performed by: STUDENT IN AN ORGANIZED HEALTH CARE EDUCATION/TRAINING PROGRAM

## 2024-10-27 PROCEDURE — 84484 ASSAY OF TROPONIN QUANT: CPT | Performed by: STUDENT IN AN ORGANIZED HEALTH CARE EDUCATION/TRAINING PROGRAM

## 2024-10-27 PROCEDURE — 87389 HIV-1 AG W/HIV-1&-2 AB AG IA: CPT | Mod: PARLAB | Performed by: STUDENT IN AN ORGANIZED HEALTH CARE EDUCATION/TRAINING PROGRAM

## 2024-10-27 PROCEDURE — 2500000002 HC RX 250 W HCPCS SELF ADMINISTERED DRUGS (ALT 637 FOR MEDICARE OP, ALT 636 FOR OP/ED): Mod: MUE | Performed by: STUDENT IN AN ORGANIZED HEALTH CARE EDUCATION/TRAINING PROGRAM

## 2024-10-27 PROCEDURE — 99285 EMERGENCY DEPT VISIT HI MDM: CPT | Mod: 25

## 2024-10-27 PROCEDURE — 80307 DRUG TEST PRSMV CHEM ANLYZR: CPT | Performed by: STUDENT IN AN ORGANIZED HEALTH CARE EDUCATION/TRAINING PROGRAM

## 2024-10-27 PROCEDURE — 71045 X-RAY EXAM CHEST 1 VIEW: CPT | Mod: FOREIGN READ | Performed by: RADIOLOGY

## 2024-10-27 PROCEDURE — 36415 COLL VENOUS BLD VENIPUNCTURE: CPT | Performed by: STUDENT IN AN ORGANIZED HEALTH CARE EDUCATION/TRAINING PROGRAM

## 2024-10-27 PROCEDURE — 70496 CT ANGIOGRAPHY HEAD: CPT

## 2024-10-27 PROCEDURE — 81003 URINALYSIS AUTO W/O SCOPE: CPT | Mod: 59 | Performed by: STUDENT IN AN ORGANIZED HEALTH CARE EDUCATION/TRAINING PROGRAM

## 2024-10-27 PROCEDURE — 70498 CT ANGIOGRAPHY NECK: CPT | Performed by: RADIOLOGY

## 2024-10-27 PROCEDURE — 85025 COMPLETE CBC W/AUTO DIFF WBC: CPT | Performed by: STUDENT IN AN ORGANIZED HEALTH CARE EDUCATION/TRAINING PROGRAM

## 2024-10-27 PROCEDURE — 86038 ANTINUCLEAR ANTIBODIES: CPT | Mod: PARLAB | Performed by: STUDENT IN AN ORGANIZED HEALTH CARE EDUCATION/TRAINING PROGRAM

## 2024-10-27 PROCEDURE — 96374 THER/PROPH/DIAG INJ IV PUSH: CPT | Mod: 59

## 2024-10-27 PROCEDURE — 71045 X-RAY EXAM CHEST 1 VIEW: CPT

## 2024-10-27 PROCEDURE — 86780 TREPONEMA PALLIDUM: CPT | Mod: PARLAB | Performed by: STUDENT IN AN ORGANIZED HEALTH CARE EDUCATION/TRAINING PROGRAM

## 2024-10-27 PROCEDURE — 85610 PROTHROMBIN TIME: CPT | Performed by: STUDENT IN AN ORGANIZED HEALTH CARE EDUCATION/TRAINING PROGRAM

## 2024-10-27 PROCEDURE — 2500000001 HC RX 250 WO HCPCS SELF ADMINISTERED DRUGS (ALT 637 FOR MEDICARE OP): Performed by: STUDENT IN AN ORGANIZED HEALTH CARE EDUCATION/TRAINING PROGRAM

## 2024-10-27 PROCEDURE — 82550 ASSAY OF CK (CPK): CPT | Performed by: STUDENT IN AN ORGANIZED HEALTH CARE EDUCATION/TRAINING PROGRAM

## 2024-10-27 PROCEDURE — 70496 CT ANGIOGRAPHY HEAD: CPT | Performed by: RADIOLOGY

## 2024-10-27 PROCEDURE — 96376 TX/PRO/DX INJ SAME DRUG ADON: CPT | Mod: 59

## 2024-10-27 PROCEDURE — 70450 CT HEAD/BRAIN W/O DYE: CPT

## 2024-10-27 PROCEDURE — 2500000004 HC RX 250 GENERAL PHARMACY W/ HCPCS (ALT 636 FOR OP/ED): Performed by: STUDENT IN AN ORGANIZED HEALTH CARE EDUCATION/TRAINING PROGRAM

## 2024-10-27 PROCEDURE — 82607 VITAMIN B-12: CPT | Mod: PARLAB | Performed by: STUDENT IN AN ORGANIZED HEALTH CARE EDUCATION/TRAINING PROGRAM

## 2024-10-27 PROCEDURE — 93005 ELECTROCARDIOGRAM TRACING: CPT

## 2024-10-27 PROCEDURE — 36415 COLL VENOUS BLD VENIPUNCTURE: CPT | Mod: PARLAB | Performed by: STUDENT IN AN ORGANIZED HEALTH CARE EDUCATION/TRAINING PROGRAM

## 2024-10-27 PROCEDURE — 80053 COMPREHEN METABOLIC PANEL: CPT | Performed by: STUDENT IN AN ORGANIZED HEALTH CARE EDUCATION/TRAINING PROGRAM

## 2024-10-27 PROCEDURE — 80320 DRUG SCREEN QUANTALCOHOLS: CPT | Performed by: STUDENT IN AN ORGANIZED HEALTH CARE EDUCATION/TRAINING PROGRAM

## 2024-10-27 PROCEDURE — 86235 NUCLEAR ANTIGEN ANTIBODY: CPT | Performed by: STUDENT IN AN ORGANIZED HEALTH CARE EDUCATION/TRAINING PROGRAM

## 2024-10-27 PROCEDURE — 96372 THER/PROPH/DIAG INJ SC/IM: CPT | Performed by: STUDENT IN AN ORGANIZED HEALTH CARE EDUCATION/TRAINING PROGRAM

## 2024-10-27 PROCEDURE — 84443 ASSAY THYROID STIM HORMONE: CPT | Performed by: STUDENT IN AN ORGANIZED HEALTH CARE EDUCATION/TRAINING PROGRAM

## 2024-10-27 PROCEDURE — 2500000004 HC RX 250 GENERAL PHARMACY W/ HCPCS (ALT 636 FOR OP/ED)

## 2024-10-27 PROCEDURE — 85730 THROMBOPLASTIN TIME PARTIAL: CPT | Performed by: STUDENT IN AN ORGANIZED HEALTH CARE EDUCATION/TRAINING PROGRAM

## 2024-10-27 RX ORDER — PIOGLITAZONEHYDROCHLORIDE 30 MG/1
45 TABLET ORAL
Status: DISCONTINUED | OUTPATIENT
Start: 2024-10-28 | End: 2024-10-28 | Stop reason: HOSPADM

## 2024-10-27 RX ORDER — GLIPIZIDE 10 MG/1
10 TABLET ORAL
Status: DISCONTINUED | OUTPATIENT
Start: 2024-10-28 | End: 2024-10-28 | Stop reason: HOSPADM

## 2024-10-27 RX ORDER — ROSUVASTATIN CALCIUM 10 MG/1
20 TABLET, COATED ORAL DAILY
Status: DISCONTINUED | OUTPATIENT
Start: 2024-10-27 | End: 2024-10-28 | Stop reason: HOSPADM

## 2024-10-27 RX ORDER — BRIMONIDINE TARTRATE 2 MG/ML
1 SOLUTION/ DROPS OPHTHALMIC 2 TIMES DAILY
COMMUNITY
End: 2024-10-31 | Stop reason: HOSPADM

## 2024-10-27 RX ORDER — CHOLECALCIFEROL (VITAMIN D3) 25 MCG
4000 TABLET ORAL DAILY
Status: DISCONTINUED | OUTPATIENT
Start: 2024-10-27 | End: 2024-10-28 | Stop reason: HOSPADM

## 2024-10-27 RX ORDER — MIDAZOLAM HYDROCHLORIDE 5 MG/ML
5 INJECTION INTRAMUSCULAR; INTRAVENOUS ONCE
Status: DISCONTINUED | OUTPATIENT
Start: 2024-10-27 | End: 2024-10-27

## 2024-10-27 RX ORDER — LORAZEPAM 2 MG/ML
2 INJECTION INTRAMUSCULAR ONCE
Status: COMPLETED | OUTPATIENT
Start: 2024-10-27 | End: 2024-10-27

## 2024-10-27 RX ORDER — INSULIN LISPRO 100 [IU]/ML
5 INJECTION, SOLUTION INTRAVENOUS; SUBCUTANEOUS ONCE
Status: DISCONTINUED | OUTPATIENT
Start: 2024-10-27 | End: 2024-10-27

## 2024-10-27 RX ORDER — ALBUTEROL SULFATE 90 UG/1
2 INHALANT RESPIRATORY (INHALATION) EVERY 4 HOURS PRN
COMMUNITY
Start: 2024-07-22

## 2024-10-27 RX ORDER — CARBOXYMETHYLCELLULOSE SODIUM 10 MG/ML
1 GEL OPHTHALMIC NIGHTLY
COMMUNITY
Start: 2024-02-07

## 2024-10-27 RX ORDER — HALOPERIDOL 5 MG/ML
5 INJECTION INTRAMUSCULAR ONCE
Status: COMPLETED | OUTPATIENT
Start: 2024-10-27 | End: 2024-10-27

## 2024-10-27 RX ORDER — LORAZEPAM 2 MG/ML
INJECTION INTRAMUSCULAR
Status: COMPLETED
Start: 2024-10-27 | End: 2024-10-27

## 2024-10-27 RX ORDER — PANTOPRAZOLE SODIUM 40 MG/1
40 TABLET, DELAYED RELEASE ORAL DAILY
Status: DISCONTINUED | OUTPATIENT
Start: 2024-10-27 | End: 2024-10-28 | Stop reason: HOSPADM

## 2024-10-27 RX ORDER — METFORMIN HYDROCHLORIDE 500 MG/1
1000 TABLET ORAL
Status: DISCONTINUED | OUTPATIENT
Start: 2024-10-27 | End: 2024-10-28 | Stop reason: HOSPADM

## 2024-10-27 RX ORDER — PIOGLITAZONEHYDROCHLORIDE 45 MG/1
45 TABLET ORAL
COMMUNITY
Start: 2024-09-05

## 2024-10-27 RX ORDER — LEVETIRACETAM 500 MG/1
500 TABLET ORAL 2 TIMES DAILY
Status: DISCONTINUED | OUTPATIENT
Start: 2024-10-27 | End: 2024-10-28 | Stop reason: HOSPADM

## 2024-10-27 RX ORDER — CETIRIZINE HYDROCHLORIDE 10 MG/1
10 TABLET ORAL DAILY
Status: DISCONTINUED | OUTPATIENT
Start: 2024-10-27 | End: 2024-10-28 | Stop reason: HOSPADM

## 2024-10-27 RX ORDER — RISPERIDONE 0.25 MG/1
1 TABLET ORAL NIGHTLY
Status: DISCONTINUED | OUTPATIENT
Start: 2024-10-27 | End: 2024-10-28 | Stop reason: HOSPADM

## 2024-10-27 RX ORDER — BUSPIRONE HYDROCHLORIDE 10 MG/1
10 TABLET ORAL
COMMUNITY
Start: 2024-07-08

## 2024-10-27 RX ORDER — CARBOXYMETHYLCELLULOSE SODIUM 5 MG/ML
1 SOLUTION/ DROPS OPHTHALMIC 4 TIMES DAILY
COMMUNITY
Start: 2024-02-07 | End: 2024-10-31 | Stop reason: HOSPADM

## 2024-10-27 RX ORDER — BUSPIRONE HYDROCHLORIDE 10 MG/1
10 TABLET ORAL
Status: DISCONTINUED | OUTPATIENT
Start: 2024-10-27 | End: 2024-10-28 | Stop reason: HOSPADM

## 2024-10-27 RX ORDER — MIDAZOLAM HYDROCHLORIDE 5 MG/ML
5 INJECTION INTRAMUSCULAR; INTRAVENOUS ONCE
Status: COMPLETED | OUTPATIENT
Start: 2024-10-27 | End: 2024-10-27

## 2024-10-27 RX ORDER — SERTRALINE HYDROCHLORIDE 50 MG/1
100 TABLET, FILM COATED ORAL 2 TIMES DAILY
Status: DISCONTINUED | OUTPATIENT
Start: 2024-10-27 | End: 2024-10-28 | Stop reason: HOSPADM

## 2024-10-27 RX ORDER — LISINOPRIL 5 MG/1
5 TABLET ORAL DAILY
Status: DISCONTINUED | OUTPATIENT
Start: 2024-10-27 | End: 2024-10-28 | Stop reason: HOSPADM

## 2024-10-27 SDOH — HEALTH STABILITY: MENTAL HEALTH: CONTENT: UNABLE TO ASSESS

## 2024-10-27 SDOH — HEALTH STABILITY: MENTAL HEALTH: HALLUCINATION: UNABLE TO ASSESS

## 2024-10-27 SDOH — HEALTH STABILITY: MENTAL HEALTH: BEHAVIORS/MOOD: NONVERBAL;NOT INTERACTIVE

## 2024-10-27 SDOH — HEALTH STABILITY: MENTAL HEALTH: BEHAVIORAL HEALTH(WDL): EXCEPTIONS TO WDL

## 2024-10-27 ASSESSMENT — LIFESTYLE VARIABLES
EVER FELT BAD OR GUILTY ABOUT YOUR DRINKING: NO
TOTAL SCORE: 0
HAVE PEOPLE ANNOYED YOU BY CRITICIZING YOUR DRINKING: NO
HAVE YOU EVER FELT YOU SHOULD CUT DOWN ON YOUR DRINKING: NO
EVER HAD A DRINK FIRST THING IN THE MORNING TO STEADY YOUR NERVES TO GET RID OF A HANGOVER: NO

## 2024-10-27 ASSESSMENT — PAIN - FUNCTIONAL ASSESSMENT
PAIN_FUNCTIONAL_ASSESSMENT: WONG-BAKER FACES

## 2024-10-27 ASSESSMENT — PAIN SCALES - WONG BAKER
WONGBAKER_NUMERICALRESPONSE: NO HURT

## 2024-10-28 ENCOUNTER — HOSPITAL ENCOUNTER (INPATIENT)
Facility: HOSPITAL | Age: 74
LOS: 3 days | Discharge: NURSING, REHAB OR LONG-TERM CARE | End: 2024-10-31
Attending: STUDENT IN AN ORGANIZED HEALTH CARE EDUCATION/TRAINING PROGRAM | Admitting: INTERNAL MEDICINE
Payer: MEDICARE

## 2024-10-28 ENCOUNTER — APPOINTMENT (OUTPATIENT)
Dept: NEUROLOGY | Facility: HOSPITAL | Age: 74
End: 2024-10-28
Payer: MEDICARE

## 2024-10-28 VITALS
HEIGHT: 69 IN | HEART RATE: 92 BPM | BODY MASS INDEX: 35.43 KG/M2 | DIASTOLIC BLOOD PRESSURE: 64 MMHG | TEMPERATURE: 98.1 F | OXYGEN SATURATION: 97 % | RESPIRATION RATE: 18 BRPM | WEIGHT: 239.2 LBS | SYSTOLIC BLOOD PRESSURE: 133 MMHG

## 2024-10-28 DIAGNOSIS — K59.00 CONSTIPATION, UNSPECIFIED CONSTIPATION TYPE: ICD-10-CM

## 2024-10-28 DIAGNOSIS — F32.A DEPRESSION, UNSPECIFIED DEPRESSION TYPE: ICD-10-CM

## 2024-10-28 DIAGNOSIS — R07.9 CHEST PAIN, UNSPECIFIED TYPE: ICD-10-CM

## 2024-10-28 DIAGNOSIS — R41.82 AMS (ALTERED MENTAL STATUS): Primary | ICD-10-CM

## 2024-10-28 DIAGNOSIS — Z74.09 IMPAIRED MOBILITY: ICD-10-CM

## 2024-10-28 LAB
ALBUMIN SERPL BCP-MCNC: 4.2 G/DL (ref 3.4–5)
ALP SERPL-CCNC: 61 U/L (ref 33–136)
ALT SERPL W P-5'-P-CCNC: 11 U/L (ref 10–52)
AMPHETAMINES UR QL SCN: ABNORMAL
ANA PATTERN: ABNORMAL
ANA SER QL HEP2 SUBST: POSITIVE
ANA TITR SER IF: ABNORMAL {TITER}
ANION GAP SERPL CALC-SCNC: 12 MMOL/L (ref 10–20)
AST SERPL W P-5'-P-CCNC: 22 U/L (ref 9–39)
ATRIAL RATE: 84 BPM
BARBITURATES UR QL SCN: ABNORMAL
BENZODIAZ UR QL SCN: ABNORMAL
BILIRUB SERPL-MCNC: 0.5 MG/DL (ref 0–1.2)
BUN SERPL-MCNC: 21 MG/DL (ref 6–23)
BZE UR QL SCN: ABNORMAL
CALCIUM SERPL-MCNC: 9.1 MG/DL (ref 8.6–10.6)
CANNABINOIDS UR QL SCN: ABNORMAL
CENTROMERE B AB SER-ACNC: <0.2 AI
CHLORIDE SERPL-SCNC: 106 MMOL/L (ref 98–107)
CHROMATIN AB SERPL-ACNC: <0.2 AI
CO2 SERPL-SCNC: 27 MMOL/L (ref 21–32)
CREAT SERPL-MCNC: 1.01 MG/DL (ref 0.5–1.3)
DSDNA AB SER-ACNC: 1 IU/ML
EGFRCR SERPLBLD CKD-EPI 2021: 77 ML/MIN/1.73M*2
ENA JO1 AB SER QL IA: <0.2 AI
ENA RNP AB SER IA-ACNC: <0.2 AI
ENA SCL70 AB SER QL IA: <0.2 AI
ENA SM AB SER IA-ACNC: <0.2 AI
ENA SM+RNP AB SER QL IA: <0.2 AI
ENA SS-A AB SER IA-ACNC: 0.3 AI
ENA SS-B AB SER IA-ACNC: <0.2 AI
ERYTHROCYTE [DISTWIDTH] IN BLOOD BY AUTOMATED COUNT: 13.5 % (ref 11.5–14.5)
EST. AVERAGE GLUCOSE BLD GHB EST-MCNC: 166 MG/DL
FENTANYL+NORFENTANYL UR QL SCN: ABNORMAL
GLUCOSE BLD MANUAL STRIP-MCNC: 147 MG/DL (ref 74–99)
GLUCOSE BLD MANUAL STRIP-MCNC: 153 MG/DL (ref 74–99)
GLUCOSE BLD MANUAL STRIP-MCNC: 171 MG/DL (ref 74–99)
GLUCOSE SERPL-MCNC: 180 MG/DL (ref 74–99)
HBA1C MFR BLD: 7.4 %
HCT VFR BLD AUTO: 34.8 % (ref 41–52)
HGB BLD-MCNC: 11 G/DL (ref 13.5–17.5)
HIV 1+2 AB+HIV1 P24 AG SERPL QL IA: NONREACTIVE
LEVETIRACETAM SERPL-MCNC: 10 UG/ML (ref 10–40)
MCH RBC QN AUTO: 31.3 PG (ref 26–34)
MCHC RBC AUTO-ENTMCNC: 31.6 G/DL (ref 32–36)
MCV RBC AUTO: 99 FL (ref 80–100)
METHADONE UR QL SCN: ABNORMAL
NRBC BLD-RTO: 0 /100 WBCS (ref 0–0)
OPIATES UR QL SCN: ABNORMAL
OXYCODONE+OXYMORPHONE UR QL SCN: ABNORMAL
P AXIS: 72 DEGREES
P OFFSET: 102 MS
P ONSET: 22 MS
PCP UR QL SCN: ABNORMAL
PLATELET # BLD AUTO: 178 X10*3/UL (ref 150–450)
POTASSIUM SERPL-SCNC: 4 MMOL/L (ref 3.5–5.3)
PR INTERVAL: 392 MS
PROT SERPL-MCNC: 6.6 G/DL (ref 6.4–8.2)
Q ONSET: 218 MS
QRS COUNT: 14 BEATS
QRS DURATION: 92 MS
QT INTERVAL: 370 MS
QTC CALCULATION(BAZETT): 437 MS
QTC FREDERICIA: 413 MS
R AXIS: 74 DEGREES
RBC # BLD AUTO: 3.51 X10*6/UL (ref 4.5–5.9)
RIBOSOMAL P AB SER-ACNC: <0.2 AI
RPR SER QL: NONREACTIVE
SODIUM SERPL-SCNC: 141 MMOL/L (ref 136–145)
T AXIS: 56 DEGREES
T OFFSET: 403 MS
TREPONEMA PALLIDUM IGG+IGM AB [PRESENCE] IN SERUM OR PLASMA BY IMMUNOASSAY: NONREACTIVE
TSH SERPL-ACNC: 1.1 MIU/L (ref 0.44–3.98)
VENTRICULAR RATE: 84 BPM
VIT B12 SERPL-MCNC: 413 PG/ML (ref 211–911)
WBC # BLD AUTO: 9.4 X10*3/UL (ref 4.4–11.3)

## 2024-10-28 PROCEDURE — 97162 PT EVAL MOD COMPLEX 30 MIN: CPT | Mod: GP | Performed by: PHYSICAL THERAPIST

## 2024-10-28 PROCEDURE — 86235 NUCLEAR ANTIGEN ANTIBODY: CPT

## 2024-10-28 PROCEDURE — 85027 COMPLETE CBC AUTOMATED: CPT

## 2024-10-28 PROCEDURE — 80177 DRUG SCRN QUAN LEVETIRACETAM: CPT

## 2024-10-28 PROCEDURE — 97166 OT EVAL MOD COMPLEX 45 MIN: CPT | Mod: GO

## 2024-10-28 PROCEDURE — 99223 1ST HOSP IP/OBS HIGH 75: CPT

## 2024-10-28 PROCEDURE — 82947 ASSAY GLUCOSE BLOOD QUANT: CPT

## 2024-10-28 PROCEDURE — 2500000001 HC RX 250 WO HCPCS SELF ADMINISTERED DRUGS (ALT 637 FOR MEDICARE OP)

## 2024-10-28 PROCEDURE — 95816 EEG AWAKE AND DROWSY: CPT | Performed by: PSYCHIATRY & NEUROLOGY

## 2024-10-28 PROCEDURE — 86318 IA INFECTIOUS AGENT ANTIBODY: CPT

## 2024-10-28 PROCEDURE — 84443 ASSAY THYROID STIM HORMONE: CPT

## 2024-10-28 PROCEDURE — 99233 SBSQ HOSP IP/OBS HIGH 50: CPT | Performed by: PSYCHIATRY & NEUROLOGY

## 2024-10-28 PROCEDURE — 36415 COLL VENOUS BLD VENIPUNCTURE: CPT

## 2024-10-28 PROCEDURE — 2500000004 HC RX 250 GENERAL PHARMACY W/ HCPCS (ALT 636 FOR OP/ED)

## 2024-10-28 PROCEDURE — 2500000002 HC RX 250 W HCPCS SELF ADMINISTERED DRUGS (ALT 637 FOR MEDICARE OP, ALT 636 FOR OP/ED)

## 2024-10-28 PROCEDURE — 1100000001 HC PRIVATE ROOM DAILY

## 2024-10-28 PROCEDURE — 86038 ANTINUCLEAR ANTIBODIES: CPT

## 2024-10-28 PROCEDURE — 95816 EEG AWAKE AND DROWSY: CPT

## 2024-10-28 PROCEDURE — 80053 COMPREHEN METABOLIC PANEL: CPT

## 2024-10-28 PROCEDURE — 83036 HEMOGLOBIN GLYCOSYLATED A1C: CPT

## 2024-10-28 PROCEDURE — 80307 DRUG TEST PRSMV CHEM ANLYZR: CPT

## 2024-10-28 RX ORDER — THIAMINE HYDROCHLORIDE 100 MG/ML
500 INJECTION, SOLUTION INTRAMUSCULAR; INTRAVENOUS 3 TIMES DAILY
Status: COMPLETED | OUTPATIENT
Start: 2024-10-28 | End: 2024-10-30

## 2024-10-28 RX ORDER — RISPERIDONE 1 MG/1
1 TABLET ORAL NIGHTLY
Status: DISCONTINUED | OUTPATIENT
Start: 2024-10-28 | End: 2024-10-30

## 2024-10-28 RX ORDER — SERTRALINE HYDROCHLORIDE 50 MG/1
100 TABLET, FILM COATED ORAL 2 TIMES DAILY
Status: DISCONTINUED | OUTPATIENT
Start: 2024-10-28 | End: 2024-10-31 | Stop reason: HOSPADM

## 2024-10-28 RX ORDER — POLYETHYLENE GLYCOL 3350 17 G/17G
17 POWDER, FOR SOLUTION ORAL DAILY
Status: DISCONTINUED | OUTPATIENT
Start: 2024-10-28 | End: 2024-10-30

## 2024-10-28 RX ORDER — INSULIN LISPRO 100 [IU]/ML
0-10 INJECTION, SOLUTION INTRAVENOUS; SUBCUTANEOUS
Status: DISCONTINUED | OUTPATIENT
Start: 2024-10-28 | End: 2024-10-31 | Stop reason: HOSPADM

## 2024-10-28 RX ORDER — LEVETIRACETAM 500 MG/1
500 TABLET ORAL 2 TIMES DAILY
Status: DISCONTINUED | OUTPATIENT
Start: 2024-10-28 | End: 2024-10-31 | Stop reason: HOSPADM

## 2024-10-28 RX ORDER — ASPIRIN 81 MG/1
81 TABLET ORAL DAILY
COMMUNITY

## 2024-10-28 RX ORDER — BUSPIRONE HYDROCHLORIDE 10 MG/1
10 TABLET ORAL
Status: DISCONTINUED | OUTPATIENT
Start: 2024-10-28 | End: 2024-10-31 | Stop reason: HOSPADM

## 2024-10-28 RX ORDER — LANOLIN ALCOHOL/MO/W.PET/CERES
100 CREAM (GRAM) TOPICAL DAILY
Status: DISCONTINUED | OUTPATIENT
Start: 2024-10-31 | End: 2024-10-31 | Stop reason: HOSPADM

## 2024-10-28 RX ORDER — ALBUTEROL SULFATE 90 UG/1
2 INHALANT RESPIRATORY (INHALATION) EVERY 4 HOURS PRN
Status: DISCONTINUED | OUTPATIENT
Start: 2024-10-28 | End: 2024-10-31 | Stop reason: HOSPADM

## 2024-10-28 RX ORDER — ENOXAPARIN SODIUM 100 MG/ML
40 INJECTION SUBCUTANEOUS EVERY 24 HOURS
Status: DISCONTINUED | OUTPATIENT
Start: 2024-10-28 | End: 2024-10-31 | Stop reason: HOSPADM

## 2024-10-28 RX ORDER — LISINOPRIL 5 MG/1
5 TABLET ORAL DAILY
Status: DISCONTINUED | OUTPATIENT
Start: 2024-10-28 | End: 2024-10-31 | Stop reason: HOSPADM

## 2024-10-28 RX ORDER — SENNOSIDES 8.6 MG/1
2 TABLET ORAL 2 TIMES DAILY
Status: DISCONTINUED | OUTPATIENT
Start: 2024-10-28 | End: 2024-10-30

## 2024-10-28 RX ORDER — FOLIC ACID 1 MG/1
1 TABLET ORAL DAILY
Status: DISCONTINUED | OUTPATIENT
Start: 2024-10-28 | End: 2024-10-31 | Stop reason: HOSPADM

## 2024-10-28 RX ORDER — ASPIRIN 81 MG/1
81 TABLET ORAL DAILY
Status: DISCONTINUED | OUTPATIENT
Start: 2024-10-28 | End: 2024-10-31 | Stop reason: HOSPADM

## 2024-10-28 RX ORDER — METFORMIN HYDROCHLORIDE 1000 MG/1
1000 TABLET ORAL
COMMUNITY

## 2024-10-28 RX ORDER — HALOPERIDOL 0.5 MG/1
0.25 TABLET ORAL EVERY 8 HOURS PRN
Status: DISCONTINUED | OUTPATIENT
Start: 2024-10-28 | End: 2024-10-31 | Stop reason: HOSPADM

## 2024-10-28 RX ORDER — ROSUVASTATIN CALCIUM 20 MG/1
20 TABLET, COATED ORAL DAILY
Status: DISCONTINUED | OUTPATIENT
Start: 2024-10-28 | End: 2024-10-31 | Stop reason: HOSPADM

## 2024-10-28 RX ORDER — PANTOPRAZOLE SODIUM 40 MG/1
40 TABLET, DELAYED RELEASE ORAL DAILY
Status: DISCONTINUED | OUTPATIENT
Start: 2024-10-28 | End: 2024-10-31 | Stop reason: HOSPADM

## 2024-10-28 RX ORDER — CHOLECALCIFEROL (VITAMIN D3) 25 MCG
4000 TABLET ORAL DAILY
Status: DISCONTINUED | OUTPATIENT
Start: 2024-10-28 | End: 2024-10-31 | Stop reason: HOSPADM

## 2024-10-28 SDOH — SOCIAL STABILITY: SOCIAL NETWORK: HOW OFTEN DO YOU ATTEND MEETINGS OF THE CLUBS OR ORGANIZATIONS YOU BELONG TO?: 1 TO 4 TIMES PER YEAR

## 2024-10-28 SDOH — ECONOMIC STABILITY: HOUSING INSECURITY: IN THE PAST 12 MONTHS, HOW MANY TIMES HAVE YOU MOVED WHERE YOU WERE LIVING?: 0

## 2024-10-28 SDOH — SOCIAL STABILITY: SOCIAL INSECURITY: WERE YOU ABLE TO COMPLETE ALL THE BEHAVIORAL HEALTH SCREENINGS?: NO

## 2024-10-28 SDOH — HEALTH STABILITY: MENTAL HEALTH
DO YOU FEEL STRESS - TENSE, RESTLESS, NERVOUS, OR ANXIOUS, OR UNABLE TO SLEEP AT NIGHT BECAUSE YOUR MIND IS TROUBLED ALL THE TIME - THESE DAYS?: NOT AT ALL

## 2024-10-28 SDOH — ECONOMIC STABILITY: FOOD INSECURITY: WITHIN THE PAST 12 MONTHS, YOU WORRIED THAT YOUR FOOD WOULD RUN OUT BEFORE YOU GOT THE MONEY TO BUY MORE.: NEVER TRUE

## 2024-10-28 SDOH — SOCIAL STABILITY: SOCIAL INSECURITY: HAVE YOU HAD ANY THOUGHTS OF HARMING ANYONE ELSE?: UNABLE TO ASSESS

## 2024-10-28 SDOH — SOCIAL STABILITY: SOCIAL INSECURITY: ARE YOU MARRIED, WIDOWED, DIVORCED, SEPARATED, NEVER MARRIED, OR LIVING WITH A PARTNER?: MARRIED

## 2024-10-28 SDOH — ECONOMIC STABILITY: HOUSING INSECURITY: IN THE LAST 12 MONTHS, WAS THERE A TIME WHEN YOU WERE NOT ABLE TO PAY THE MORTGAGE OR RENT ON TIME?: YES

## 2024-10-28 SDOH — HEALTH STABILITY: PHYSICAL HEALTH
HOW OFTEN DO YOU NEED TO HAVE SOMEONE HELP YOU WHEN YOU READ INSTRUCTIONS, PAMPHLETS, OR OTHER WRITTEN MATERIAL FROM YOUR DOCTOR OR PHARMACY?: NEVER

## 2024-10-28 SDOH — ECONOMIC STABILITY: HOUSING INSECURITY: AT ANY TIME IN THE PAST 12 MONTHS, WERE YOU HOMELESS OR LIVING IN A SHELTER (INCLUDING NOW)?: NO

## 2024-10-28 SDOH — ECONOMIC STABILITY: FOOD INSECURITY: WITHIN THE PAST 12 MONTHS, THE FOOD YOU BOUGHT JUST DIDN'T LAST AND YOU DIDN'T HAVE MONEY TO GET MORE.: NEVER TRUE

## 2024-10-28 SDOH — SOCIAL STABILITY: SOCIAL NETWORK
DO YOU BELONG TO ANY CLUBS OR ORGANIZATIONS SUCH AS CHURCH GROUPS, UNIONS, FRATERNAL OR ATHLETIC GROUPS, OR SCHOOL GROUPS?: YES

## 2024-10-28 SDOH — SOCIAL STABILITY: SOCIAL NETWORK: IN A TYPICAL WEEK, HOW MANY TIMES DO YOU TALK ON THE PHONE WITH FAMILY, FRIENDS, OR NEIGHBORS?: THREE TIMES A WEEK

## 2024-10-28 SDOH — SOCIAL STABILITY: SOCIAL INSECURITY: ABUSE: ADULT

## 2024-10-28 SDOH — SOCIAL STABILITY: SOCIAL NETWORK: HOW OFTEN DO YOU GET TOGETHER WITH FRIENDS OR RELATIVES?: THREE TIMES A WEEK

## 2024-10-28 SDOH — HEALTH STABILITY: PHYSICAL HEALTH: ON AVERAGE, HOW MANY MINUTES DO YOU ENGAGE IN EXERCISE AT THIS LEVEL?: PATIENT UNABLE TO ANSWER

## 2024-10-28 SDOH — ECONOMIC STABILITY: FOOD INSECURITY
HOW HARD IS IT FOR YOU TO PAY FOR THE VERY BASICS LIKE FOOD, HOUSING, MEDICAL CARE, AND HEATING?: PATIENT UNABLE TO ANSWER

## 2024-10-28 SDOH — ECONOMIC STABILITY: TRANSPORTATION INSECURITY: IN THE PAST 12 MONTHS, HAS LACK OF TRANSPORTATION KEPT YOU FROM MEDICAL APPOINTMENTS OR FROM GETTING MEDICATIONS?: NO

## 2024-10-28 SDOH — HEALTH STABILITY: PHYSICAL HEALTH
ON AVERAGE, HOW MANY DAYS PER WEEK DO YOU ENGAGE IN MODERATE TO STRENUOUS EXERCISE (LIKE A BRISK WALK)?: PATIENT UNABLE TO ANSWER

## 2024-10-28 SDOH — SOCIAL STABILITY: SOCIAL INSECURITY: HAVE YOU HAD ANY THOUGHTS OF HARMING ANYONE ELSE?: NO

## 2024-10-28 SDOH — HEALTH STABILITY: PHYSICAL HEALTH: ON AVERAGE, HOW MANY DAYS PER WEEK DO YOU ENGAGE IN MODERATE TO STRENUOUS EXERCISE (LIKE A BRISK WALK)?: 0 DAYS

## 2024-10-28 SDOH — SOCIAL STABILITY: SOCIAL NETWORK: HOW OFTEN DO YOU ATTEND CHURCH OR RELIGIOUS SERVICES?: MORE THAN 4 TIMES PER YEAR

## 2024-10-28 SDOH — HEALTH STABILITY: PHYSICAL HEALTH: ON AVERAGE, HOW MANY MINUTES DO YOU ENGAGE IN EXERCISE AT THIS LEVEL?: 0 MIN

## 2024-10-28 ASSESSMENT — COGNITIVE AND FUNCTIONAL STATUS - GENERAL
TURNING FROM BACK TO SIDE WHILE IN FLAT BAD: A LOT
STANDING UP FROM CHAIR USING ARMS: A LOT
WALKING IN HOSPITAL ROOM: A LOT
MOBILITY SCORE: 6
MOVING FROM LYING ON BACK TO SITTING ON SIDE OF FLAT BED WITH BEDRAILS: A LOT
DRESSING REGULAR LOWER BODY CLOTHING: A LOT
MOBILITY SCORE: 12
HELP NEEDED FOR BATHING: A LOT
DAILY ACTIVITIY SCORE: 12
CLIMB 3 TO 5 STEPS WITH RAILING: TOTAL
STANDING UP FROM CHAIR USING ARMS: TOTAL
TOILETING: TOTAL
DRESSING REGULAR LOWER BODY CLOTHING: A LOT
TURNING FROM BACK TO SIDE WHILE IN FLAT BAD: A LOT
EATING MEALS: A LOT
DAILY ACTIVITIY SCORE: 9
CLIMB 3 TO 5 STEPS WITH RAILING: A LOT
MOVING FROM LYING ON BACK TO SITTING ON SIDE OF FLAT BED WITH BEDRAILS: TOTAL
EATING MEALS: A LOT
HELP NEEDED FOR BATHING: TOTAL
MOVING FROM LYING ON BACK TO SITTING ON SIDE OF FLAT BED WITH BEDRAILS: A LOT
MOVING TO AND FROM BED TO CHAIR: A LOT
DRESSING REGULAR UPPER BODY CLOTHING: A LOT
TOILETING: A LOT
MOVING TO AND FROM BED TO CHAIR: TOTAL
DRESSING REGULAR UPPER BODY CLOTHING: A LOT
MOBILITY SCORE: 12
CLIMB 3 TO 5 STEPS WITH RAILING: A LOT
TURNING FROM BACK TO SIDE WHILE IN FLAT BAD: TOTAL
PERSONAL GROOMING: A LOT
DAILY ACTIVITIY SCORE: 12
DRESSING REGULAR LOWER BODY CLOTHING: TOTAL
EATING MEALS: A LOT
TOILETING: A LOT
WALKING IN HOSPITAL ROOM: TOTAL
HELP NEEDED FOR BATHING: A LOT
PERSONAL GROOMING: A LOT
PATIENT BASELINE BEDBOUND: NO
PERSONAL GROOMING: A LOT
MOVING TO AND FROM BED TO CHAIR: A LOT
DRESSING REGULAR UPPER BODY CLOTHING: A LOT
WALKING IN HOSPITAL ROOM: A LOT
STANDING UP FROM CHAIR USING ARMS: A LOT

## 2024-10-28 ASSESSMENT — PAIN - FUNCTIONAL ASSESSMENT
PAIN_FUNCTIONAL_ASSESSMENT: 0-10

## 2024-10-28 ASSESSMENT — PAIN SCALES - GENERAL: PAINLEVEL_OUTOF10: 0 - NO PAIN

## 2024-10-28 ASSESSMENT — ACTIVITIES OF DAILY LIVING (ADL)
FEEDING YOURSELF: NEEDS ASSISTANCE
BATHING: NEEDS ASSISTANCE
TOILETING: NEEDS ASSISTANCE
JUDGMENT_ADEQUATE_SAFELY_COMPLETE_DAILY_ACTIVITIES: NO
HEARING - RIGHT EAR: FUNCTIONAL
BATHING_ASSISTANCE: TOTAL
LACK_OF_TRANSPORTATION: NO
WALKS IN HOME: NEEDS ASSISTANCE
DRESSING YOURSELF: NEEDS ASSISTANCE
GROOMING: NEEDS ASSISTANCE
ADEQUATE_TO_COMPLETE_ADL: UNABLE TO ASSESS
HEARING - LEFT EAR: FUNCTIONAL
PATIENT'S MEMORY ADEQUATE TO SAFELY COMPLETE DAILY ACTIVITIES?: NO
LACK_OF_TRANSPORTATION: NO

## 2024-10-28 ASSESSMENT — LIFESTYLE VARIABLES
PRESCIPTION_ABUSE_PAST_12_MONTHS: NO
SUBSTANCE_ABUSE_PAST_12_MONTHS: NO

## 2024-10-28 ASSESSMENT — PAIN SCALES - WONG BAKER
WONGBAKER_NUMERICALRESPONSE: NO HURT
WONGBAKER_NUMERICALRESPONSE: NO HURT

## 2024-10-28 NOTE — PROGRESS NOTES
"Occupational Therapy    Evaluation    Patient Name: Albert Abbott \"Taras\"  MRN: [id number]  Department: [facility]  Room: [address] [place] [address]  Today's Date: 10/28/2024  Time Calculation  Start Time: 1202  Stop Time: 1214  Time Calculation (min): 12 min        Assessment:  OT Assessment: pt w limited command following  ~40% this date. Pt  is a poor historian and further information regarding PLOF and home set up is needed. Pt required Mod A to don gown  and Total A to don socks. Pt required Total A x2 for bed mobility this date. Pt limited by cogntion, pain, decreased strength, endurance, activity tolerance, and safety awareness. Pt would benefit from continued OT to address these deficits.  Prognosis: Fair  Barriers to Discharge: None  Evaluation/Treatment Tolerance: Treatment limited secondary to agitation, Other (Comment) (limited due to agitiation (pt reports just wanting to sleep) and limited command following/confusion)  End of Session Communication: Bedside nurse  End of Session Patient Position: Bed, 3 rail up, Alarm off, not on at start of session  OT Assessment Results: Decreased ADL status, Decreased upper extremity range of motion, Decreased upper extremity strength, Decreased safe judgment during ADL, Decreased cognition, Decreased endurance, Decreased fine motor control, Decreased functional mobility, Decreased gross motor control, Decreased IADLs  Prognosis: Fair  Barriers to Discharge: None  Evaluation/Treatment Tolerance: Treatment limited secondary to agitation, Other (Comment) (limited due to agitiation (pt reports just wanting to sleep) and limited command following/confusion)  Strengths: Attitude of self  Barriers to Participation: Comorbidities  Plan:  Treatment Interventions: ADL retraining, Functional transfer training, UE strengthening/ROM, Endurance training, Cognitive reorientation, Patient/family training, Equipment evaluation/education, Fine motor coordination activities, Compensatory technique " education  OT Frequency: 3 times per week  OT Discharge Recommendations: Moderate intensity level of continued care  Equipment Recommended upon Discharge:  (TBD at next level of care)  OT Recommended Transfer Status: Assist of 2  OT - OK to Discharge: Yes  Treatment Interventions: ADL retraining, Functional transfer training, UE strengthening/ROM, Endurance training, Cognitive reorientation, Patient/family training, Equipment evaluation/education, Fine motor coordination activities, Compensatory technique education    Subjective   Current Problem:  1. AMS (altered mental status)        2. Impaired mobility          General:  General  Reason for Referral: presents with altered mental status.  Past Medical History Relevant to Rehab: T2DM c/b neuropathy, HLD, CVA, MARILYN, Depression, PTSD  Family/Caregiver Present: Yes (sitter present)  Co-Treatment: PT  Co-Treatment Reason: secondary to maximize pt safety  Prior to Session Communication: Bedside nurse  Patient Position Received: Bed, 3 rail up, Alarm off, not on at start of session  Preferred Learning Style: verbal  General Comment: pt sleeping upon OT arrival. Pt presented confused and is a poor historian. Limited engagement in therapy session this date. Clarification regarding PLOF and home set up is needed.  Precautions:  Medical Precautions: Fall precautions        Pain:  Pain Assessment  Pain Assessment: 0-10  0-10 (Numeric) Pain Score:  (pt did not give numeric pain rating but c/o pain in back during bed mobility)  Pain Location: Back  Response to Interventions: best at rest    Objective   Cognition:  Overall Cognitive Status: Impaired at baseline (per chart review, pt has early onset dementia)  Orientation Level: Disoriented to place, Disoriented to time, Disoriented to situation  Following Commands: Follows one step commands with increased time (and repetition~40% of time)  Processing Speed: Delayed           Home Living:  Type of Home: House  Lives With:  "Spouse  Home Layout: One level  Home Access: Level entry  Bathroom Shower/Tub: Walk-in shower  Bathroom Toilet: Standard  Home Living Comments: pt is a poor historian- further clarification regarding home set up is needed.  Prior Function:  Level of [place]:  (pt is  apoor historian, further clarficiation needed. Per H&P, \"his wife reports he has not kept up with his personal hygiene for the last 2 years and has not showered in over 1-2 months, he is independent in eating.\")  Leisure: watching tv; sleep  IADL History:  Homemaking Responsibilities: No  ADL:  Eating Assistance: Moderate (anticipated)  Eating Deficit: Setup, Supervision/safety, Increased time to complete, Verbal cueing  Grooming Assistance: Moderate (anticipated)  Grooming Deficit: Setup, Steadying, Verbal cueing, Supervision/safety, Increased time to complete  Bathing Assistance: Total (anticipated)  Bathing Deficit: Setup, Steadying, Verbal cueing, Supervision/safety, Increased time to complete   UE Dressing Assistance: Moderate  UE Dressing Deficit: Steadying, Verbal cueing, Supervision/safety, Increased time to complete, Thread RUE, Thread LUE  LE Dressing Assistance: Total  LE Dressing Deficit: Verbal cueing, Supervision/safety, Increased time to complete, Don/doff R sock, Don/doff L sock  Toileting Assistance with Device: Total (anticipated)  Toileting Deficit: Steadying, Verbal cueing, Supervison/safety, Increased time to complete  Activity Tolerance:  Endurance: Endurance does not limit participation in activity  Bed Mobility/Transfers: Bed Mobility  Bed Mobility: Yes  Bed Mobility 1  Bed Mobility 1: Rolling right  Level of Assistance 1: Maximum assistance, +2  Bed Mobility 2  Bed Mobility  2: Scooting  Level of Assistance 2: Dependent, +2  Bed Mobility Comments 2: Dependent Ax2 to boost in bed w use of draw sheet   Sensation:  Light Touch: No apparent deficits  Strength:  Strength Comments: unable to formally assess due to limited command " following.  Perception:  Inattention/Neglect: Appears intact  Coordination:  Movements are Fluid and Coordinated: No   Hand Function:  Gross Grasp: Functional (able to grasp hands ~3/5 strength)  Coordination:  (not assessed due to limited command following)  Extremities: RUE   RUE : Exceptions to WFL  RUE AROM (degrees)  RUE AROM Comment: shoulder flexion ~60 degrees  RUE Strength  RUE Overall Strength: Unable to assess (due to limited command following/confusion) and LUE   LUE: Exceptions to WFL  LUE AROM (degrees)  LUE AROM Comment: shoulder flexion ~60 degrees  LUE Strength  LUE Overall Strength: Unable to assess (due to limited command following/confusion)        Outcome Measures:[facility] Daily Activity  Putting on and taking off regular lower body clothing: Total  Bathing (including washing, rinsing, drying): Total  Putting on and taking off regular upper body clothing: A lot  Toileting, which includes using toilet, bedpan or urinal: Total  Taking care of personal grooming such as brushing teeth: A lot  Eating Meals: A lot  Daily Activity - Total Score: 9        Education Documentation  Body Mechanics, taught by Meme Herrera OT at 10/28/2024  2:59 PM.  Learner: Patient  Readiness: Acceptance  Method: Explanation, Demonstration  Response: Needs Reinforcement    Precautions, taught by Meme Herrera OT at 10/28/2024  2:59 PM.  Learner: Patient  Readiness: Acceptance  Method: Explanation, Demonstration  Response: Needs Reinforcement    ADL Training, taught by Meme Herrera OT at 10/28/2024  2:59 PM.  Learner: Patient  Readiness: Acceptance  Method: Explanation, Demonstration  Response: Needs Reinforcement    Education Comments  No comments found.        OP EDUCATION:       Goals:  Encounter Problems       Encounter Problems (Active)       ADLs       Patient with complete upper body dressing with stand by assist level of assistance donning and doffing all UE clothes with no adaptive equipment while edge of bed.  (Progressing)       Start:  10/28/24    Expected End:  11/18/24            Patient with complete lower body dressing with moderate assist level of assistance donning and doffing socks with PRN adaptive equipment while edge of bed. (Progressing)       Start:  10/28/24    Expected End:  11/18/24            Patient will complete daily grooming tasks brushing teeth and washing face/hair with stand by assist level of assistance and PRN adaptive equipment while edge of bed. (Progressing)       Start:  10/28/24    Expected End:  11/18/24               BALANCE       Pt will maintain dynamic sitting balance during ADL task with moderate assist level of assistance in order to demonstrate decreased risk of falling and improved postural control. (Progressing)       Start:  10/28/24    Expected End:  11/18/24               TRANSFERS       Patient will perform bed mobility moderate assist level of assistance and bed rails in order to improve safety and independence with mobility (Progressing)       Start:  10/28/24    Expected End:  11/18/24                      10/28/24 AT 3:03 PM SILVINO CLARK, [facility] OFFICE: [phone]
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
## 2024-10-28 NOTE — PROGRESS NOTES
"Physical Therapy    Physical Therapy Evaluation    Patient Name: Albert Abbott \"Taras\"  MRN: [id number]  Department: [facility]  Room: [address] [place] [address]  Today's Date: 10/28/2024   Time Calculation  Start Time: 1201  Stop Time: 1214  Time Calculation (min): 13 min    Assessment/Plan   PT Assessment  PT Assessment Results: Decreased strength, Decreased endurance, Impaired balance, Decreased mobility, Decreased cognition, Pain, Impaired judgement  Rehab Prognosis: Good  Barriers to Participation: Comorbidities  End of Session Communication: Bedside nurse  Assessment Comment: Pt presents with decreased strength, endurance, balance, mobility and impaired cogntion. Pt would benefit from continued PT to increase safety and independence with mobility  End of Session Patient Position: Bed, 3 rail up, Alarm off, not on at start of session  IP OR SWING BED PT PLAN  Inpatient or Swing Bed: Inpatient  PT Plan  Treatment/Interventions: Bed mobility, Transfer training, Gait training, Balance training, Strengthening, Endurance training, Therapeutic exercise, Therapeutic activity  PT Plan: Ongoing PT  PT Frequency: 3 times per week  PT Discharge Recommendations: Moderate intensity level of continued care, 24 hr supervision due to cognition  PT Recommended Transfer Status: Assist x2  PT - OK to Discharge: Yes    Subjective   General Visit Information:  General  Reason for Referral: presents with altered mental status.  Past Medical History Relevant to Rehab: T2DM c/b peripheral neuropathy, HLD, CVA, MARILYN, Depression, history of seizures, PTSD  Family/Caregiver Present: Yes (sitter present)  Co-Treatment: OT  Co-Treatment Reason: Cotx with OT for pt's safety with mobility  Prior to Session Communication: Bedside nurse  Patient Position Received: Bed, 3 rail up, Alarm off, not on at start of session  Preferred Learning Style: verbal  General Comment: Pt sleeping upon arrival, confused, poor historian. Needs further clarification on home " "set-up and prior level  Home Living:  Home Living  Type of Home: House  Lives With: Spouse  Home Adaptive Equipment:  (per EMR:Cane, shower chair, rollator, BP machine, glucometer and chair lift in the basement.)  Home Layout: One level  Home Access: Level entry  Bathroom Shower/Tub: Walk-in shower (Pt reports no to \"TUB)  Prior Level of Function:  Prior Function Per Pt/Caregiver Report  Level of [place]:  (Per EMR, pt indepedent with mobility, needs clarification, pt poor historian)  Leisure: watching tv; sleep  Prior Function Comments: spouse drives, pt recently fell out of bed.  Precautions:  Precautions  Medical Precautions: Fall precautions, Seizure precautions       Objective   Pain:  Pain Assessment  Pain Assessment: 0-10  0-10 (Numeric) Pain Score:  (unrated, c/o of pain during bed mobility)  Pain Location: Back  Cognition:  Cognition  Overall Cognitive Status: Impaired (early onset of dementia per chart review)  Orientation Level: (Oriented to self and ) Disoriented to place, Disoriented to time, Disoriented to situation  Following Commands: (follow one steps commands s 40% of the time with increased time and repetition)  Processing Speed: Delayed    General Assessments:      Sensation  Light Touch: No apparent deficitsStrength  Strength Comments: BLE's 3-/5 to 3/5 based on mobility in bed,           Functional Assessments:  Bed Mobility  Bed Mobility: Yes  Bed Mobility 1  Bed Mobility 1: Rolling right  Level of Assistance 1: Maximum assistance, +2  Bed Mobility Comments 1: use of draw sheet (pt declined supine to sit)  Bed Mobility 2  Bed Mobility  2: Scooting  Level of Assistance 2: Dependent, +2  Bed Mobility Comments 2: to [facility] (using draw sheet)    Transfers  Transfer: No        Outcome Measures:  [facility] Basic Mobility  Turning from your back to your side while in a flat bed without using bedrails: Total  Moving from lying on your back to sitting on the side of a flat bed without using bedrails: " Total  Moving to and from bed to chair (including a wheelchair): Total  Standing up from a chair using your arms (e.g. wheelchair or bedside chair): Total  To walk in hospital room: Total  Climbing 3-5 steps with railing: Total  Basic Mobility - Total Score: 6    Encounter Problems       Encounter Problems (Active)       Balance       Pt will tolerate seated EOB ~10 mins with SBA       Start:  10/28/24    Expected End:  11/11/24               Mobility       Pt will be Nuria for ambulation 20 ft with LRAD       Start:  10/28/24    Expected End:  11/11/24               PT Transfers       Pt will be Nuria for sit to stand and bed to chair transfers with LRAD       Start:  10/28/24    Expected End:  11/11/24            Pt will be Nuria for bed mobility       Start:  10/28/24    Expected End:  11/11/24               Pain - Adult              Education Documentation  Precautions, taught by Ning Palm, PT at 10/28/2024  2:43 PM.  Learner: Patient  Readiness: Acceptance  Method: Explanation  Response: Needs Reinforcement    Education Comments  No comments found.

## 2024-10-28 NOTE — PROGRESS NOTES
"Albert Abbott \"Taras\" is a 74 y.o. male on day 0 of admission presenting with AMS (altered mental status).    Subjective   Albert Abbott is a 74 y.o. male with a past medical history of T2DM c/b neuropathy, HLD, CVA, MARILYN, Depression, history of seizures, PTSD who presents with altered mental status. He began to not acting like him self on Sunday morning. His wife reported that he came into the bathroom and was not acting like himself. He did not recognize her, was not speaking sensically, and was unable to answer her questions or be redirected. EMS was called and the patient was taken to [facility] ED before being transferred to [facility].     On interview, the patient was able to state his name and note that he lived with his wife. He was also able to state that he ate oatmeal for breakfast most mornings but was not able to answer the name of the president, year, or other questions regarding his circumstance. He was able to respond that he did not drink alcohol when asked.     Per his wife, the patient spends 22-hours per day watching TV. He began to not having interest in going to dinner or the movies and wanting to spend the majority of time in his bed and house about one year ago related to his chronic depression. He was also noted that he does not complete personal hygiene frequently including showering or brushing his teeth. At baseline, he gets around with a cane, he makes his own breakfast, manages his own medications, and can drive 1-2 blocks when needed. [facility] does have a [facility] He is forgetful as baseline for daily tasks. His wife notes that he does not drink alcohol.     In the ED at [facility] pt received 2mg IV Lorazepam at 12:13 and 13:55 (4mg total) after concern for catatonia.     WBC-7.2, RBC-3.89, Hgb-12.3, Plt-186  Na- 138, K-4.5, Cl-104, Bicarb-28, BUN-27, Cr- 1.08  Ca-9.3, Alb-4.5, AST-17, ALT-14, AlkPhos-60, Tbili-0.4  CK-62, Trop-8, " "TSH-2.77 INR 1.1  Utox -, Alcohol-44, Acetaminophen <10, Salicylate <3  UA was unremarkable except 4+ glucose  HIV normal          Objective     Physical Exam  Constitutional:       General: He is not in acute distress.     Appearance: He is not ill-appearing.   HENT:      Head: Normocephalic and atraumatic.      Mouth/Throat:      Mouth: Mucous membranes are moist.   Cardiovascular:      Rate and Rhythm: Normal rate and regular rhythm.      Heart sounds: No murmur heard.     No gallop.   Pulmonary:      Effort: Pulmonary effort is normal. No respiratory distress.      Breath sounds: No stridor. No wheezing or rales.   Abdominal:      General: There is no distension.      Palpations: Abdomen is soft.      Tenderness: There is no abdominal tenderness.   Musculoskeletal:      Right lower leg: No edema.      Left lower leg: No edema.   Skin:     General: Skin is warm.      Capillary Refill: Capillary refill takes less than 2 seconds.   Neurological:      General: No focal deficit present.      Comments: Oriented to self only. Stated location as \"[place]\"    Followed commands. Moves all four extremities against gravity.          Last Recorded Vitals  Blood pressure (!) 107/47, pulse 64, temperature 36.4 °C (97.5 °F), resp. rate 20, height 1.803 m (5' 11\"), weight 107 kg (235 lb 3.2 oz), SpO2 98%.  Intake/Output last 3 Shifts:  No intake/output data recorded.    Relevant Results  Results for orders placed or performed during the hospital encounter of 10/28/24 (from the past 24 hours)   Drug Screen, Urine   Result Value Ref Range    Amphetamine Screen, Urine Presumptive Negative Presumptive Negative    Barbiturate Screen, Urine Presumptive Negative Presumptive Negative    Benzodiazepines Screen, Urine Presumptive Positive (A) Presumptive Negative    Cannabinoid Screen, Urine Presumptive Negative Presumptive Negative    Cocaine Metabolite Screen, Urine Presumptive Negative Presumptive Negative    Fentanyl " Screen, Urine Presumptive Negative Presumptive Negative    Opiate Screen, Urine Presumptive Negative Presumptive Negative    Oxycodone Screen, Urine Presumptive Negative Presumptive Negative    PCP Screen, Urine Presumptive Negative Presumptive Negative    Methadone Screen, Urine Presumptive Negative Presumptive Negative   TSH with reflex to Free T4 if abnormal   Result Value Ref Range    Thyroid Stimulating Hormone 1.10 0.44 - 3.98 mIU/L   RPR Monitoring   Result Value Ref Range    RPR  Nonreactive Nonreactive   Levetiracetam   Result Value Ref Range    Keppra 10 10 - 40 ug/mL   CBC   Result Value Ref Range    WBC 9.4 4.4 - 11.3 x10*3/uL    nRBC 0.0 0.0 - 0.0 /100 WBCs    RBC 3.51 (L) 4.50 - 5.90 x10*6/uL    Hemoglobin 11.0 (L) 13.5 - 17.5 g/dL    Hematocrit 34.8 (L) 41.0 - 52.0 %    MCV 99 80 - 100 fL    MCH 31.3 26.0 - 34.0 pg    MCHC 31.6 (L) 32.0 - 36.0 g/dL    RDW 13.5 11.5 - 14.5 %    Platelets 178 150 - 450 x10*3/uL   Comprehensive metabolic panel   Result Value Ref Range    Glucose 180 (H) 74 - 99 mg/dL    Sodium 141 136 - 145 mmol/L    Potassium 4.0 3.5 - 5.3 mmol/L    Chloride 106 98 - 107 mmol/L    Bicarbonate 27 21 - 32 mmol/L    Anion Gap 12 10 - 20 mmol/L    Urea Nitrogen 21 6 - 23 mg/dL    Creatinine 1.01 0.50 - 1.30 mg/dL    eGFR 77 >60 mL/min/1.73m*2    Calcium 9.1 8.6 - 10.6 mg/dL    Albumin 4.2 3.4 - 5.0 g/dL    Alkaline Phosphatase 61 33 - 136 U/L    Total Protein 6.6 6.4 - 8.2 g/dL    AST 22 9 - 39 U/L    Bilirubin, Total 0.5 0.0 - 1.2 mg/dL    ALT 11 10 - 52 U/L   Hemoglobin A1c   Result Value Ref Range    Hemoglobin A1C 7.4 (H) See comment %    Estimated Average Glucose 166 Not Established mg/dL   POCT GLUCOSE   Result Value Ref Range    POCT Glucose 171 (H) 74 - 99 mg/dL     CT brain attack angio head and neck W and WO IV contrast (10/27/2024)   IMPRESSION:  CTA neck:      No evidence for significant stenosis of the cervical vessels.      Mild atherosclerotic calcification at the carotid  bifurcation  bilaterally without stenosis.      CTA head:      There is mild narrowing of the distal M1 segment on the left.      The proximal right intradural vertebral artery is not visualized.  There is reconstitution proximal to the vertebrobasilar junction.      Otherwise, no evidence for significant stenosis or large branch  vessel cutoffs of the intracranial vessels.    XR chest 1 view (10/27/2024)  IMPRESSION:  Mild pulmonary edema.    CT brain attack head wo IV contrast (10/27/2024)  IMPRESSION:  No acute intracranial pathology.          Assessment/Plan   Albert Abbott is a 74 y.o. male with a past medical history of T2DM c/b peripheral neuropathy, HLD, CVA, MARILYN, Depression, history of seizures, PTSD who presents with altered mental status starting on 10/27/2024. Initially in the [facility] ED there was concern for catatonia, so the patient received lorazapem 4 mg although there was no documentation of specific catatonic findings or how they changed with administration of lorazepam. Patient on exam is oriented to only self and is overall limited in his communication. The patient had no catatonic behaviors on exam and there is a low concern for catatonia as the cause for this patients presentation. In discussion with psychiatry, it is most likely that the patient is experiencing hypoactive delirium complicated by possible underlying dementia and a known history of major depressive disorder. Metabolic and infectious work-up has been negative at this time. The patient did have a positive acute toxicology panel for alcohol; however, his wife and the patient state he does not drink alcohol. We may consider rechecking to ensure there is no other cause for the elevated serum ethanol levels. We also completing an EEG to assess for subclinical seizures.    #Altered Mental Status  :: TSH (2.77), B12 (413), SAIGE (positive), CPK (62), RPR (pending), HIV (normal)  :: Unremarkable metabolic panel except elevated glucose  (380 at admission, 180 now)  Plan:  -Consulted psychiatry:   - Initiate Haldol 0.25mg PO Q8 PRN for agitation   - Pending ammonia level  - Start folic acid 1 mg once daily  - Start thiamine 500 mg IV TID for three days followed by thiamine 100 mg PO once daily      #T2DM  :: A1c 7.4 on 10/28/2024  Plan:  -Hold home Metformin, Glyburide, and Pioglitazone   -Begin ISS  -Hypoglycemia protocol     #HTN  -Hold home Lisinopril given soft BP  -CTM     #History of Tonic-Clonic Seizure in 2021  ::  Keppra level 10 micrograms/mL  Plan:  -Continue home Keppra 500mg BID  -EEG ordered     #MARILYN  #Depression  #PTSD  -Continue home Buspar 10mg and Zoloft 100mg BID  -Holding Risperidone 1mg daily     Diet: Regular  DVT prophylaxis: Lovenox  Bowel Regimen: Miralax and sennosides  GI Prophylaxis: PPI  Code status: DNR/DNI  NOK: Danuta (wife) - [phone]     Karel Acosta, MS4

## 2024-10-28 NOTE — PROGRESS NOTES
"   10/28/24 8026   Discharge Planning   Living Arrangements Spouse/significant other   Support Systems Spouse/significant other   Assistance Needed Pending therapy recommendations.   Type of Residence Private residence   Do you have animals or pets at home? No   Who is requesting discharge planning? Other (Comment)  (pt/wife)   Home or Post Acute Services   (Pending)   Type of Home Care Services   (Pending)   Expected Discharge Disposition SNF   Does the patient need discharge transport arranged? Yes  (Wife can provide.)   RoundTrip coordination needed? Yes   Has discharge transport been arranged? No   Financial Resource Strain   How hard is it for you to pay for the very basics like food, housing, medical care, and heating? Not hard   Housing Stability   In the last 12 months, was there a time when you were not able to pay the mortgage or rent on time? N   At any time in the past 12 months, were you homeless or living in a shelter (including now)? N   Transportation Needs   In the past 12 months, has lack of transportation kept you from medical appointments or from getting medications? no   In the past 12 months, has lack of transportation kept you from meetings, work, or from getting things needed for daily living? No     Assessment Note:  Met with pt briefly and spoke with his wife Danuta ([phone]) via phone,  introduced myself as care coordinator and member of the Care Transitions team for discharge planning.  Per wife, pt spends 22 hrs/day in bed watching TV (game shows).  Pt has not showered in a while, when his wife encourages his to bathe, it is perceived as \"nagging\".  Per wife, she will plan make sure pt bathes regularly when he returns home.  Pt wife drives or pt uses senior transport through the community for pee thayer.  Pt's address, phone number and contact information was verified.  PT and OT evaluations were ordered, await recommendations. Pt's wife did not have any questions/concerns at this " time.     Previous Home Care: None  DME: Cane, shower chair, rollator, BP machine, glucometer and chair lift in the basement.  [facility].  Falls: Pt recently fell out of bed. Wife had to call EMS to get pt up.  [facility] (last visit was less than 6 months ago).  Mental Health Services: Pt was referred to behavioral health at the [facility].  Pt had 2 appts but no follow up since.    Transitional Care Coordination Progress Note:  Patient was discussed during interdisciplinary rounds.  Team members present: medical team, SW and TCC.  Plan per medical team: Pt admitted with change in mental status, work up in progress.  Psychiatry consulted for possible hypodelirium.    Payer: Medicare A/B, [employer] Medicare Supplement  Status: Inpatient  Discharge disposition: Await PT/OT evaluations.  Potential barriers: None  ADOD: 10/31  Care coordinator will continue to follow for discharge planning needs.     Josie Becerra MSN, RN-BC  Transitional Care Coordinator (TCC)  [phone]

## 2024-10-28 NOTE — PROGRESS NOTES
"Pharmacy Admission Order Reconciliation Review    Albert Abbott \"Bill\" is a 74 y.o. male admitted for AMS (altered mental status). Pharmacy reviewed the patient's unreconciled admission medications.    Prior to admission medications that were reviewed and acted on by the pharmacist include:  aspirin  These medications have been reconciled.     Any other unreconcilied medications have been addressed and will be ordered or held by the patient's medical team. Medications addressed by the pharmacist may be added or changed by the patient's medical team at any time.    Rodrigo Reagan, PharmD  Transitions of Care Pharmacist  [facility]  Please reach out via Secure Chat for questions    "

## 2024-10-28 NOTE — PROGRESS NOTES
Albert Abbott is a 74 y.o. y.o. male on day 0 of admission presenting with AMS (altered mental status) [R41.82].     Subjective   Received phone call from Ivelisse at the [facility] ([phone]) and informed her that patient was transferred to [facility] from [facility]. Discussed that patient is pending PT/OT evaluations at this time. Informed that patient is 80% service connected and would qualify to transfer to to the [facility] for skilled therapies. Will discuss further with wife once PT/OT evaluations are in.    - Kayli DE LOS SANTOS, MA, LSW  Care Transitions   [facility] Secure Chat or [phone]

## 2024-10-28 NOTE — CARE PLAN
Assumed care of patient [id number]. The patient's goals for the shift include encouraging appetite and reorientation. Patient ate 100 % of dinner - chicken marilee with broccoli. Patient used urinal with assistance, 200 ml output. No complaints of discomfort or pain during shift. Will continue to monitor.    Problem: Pain - Adult  Goal: Verbalizes/displays adequate comfort level or baseline comfort level  Outcome: Progressing     Problem: Safety - Adult  Goal: Free from fall injury  Outcome: Progressing     Problem: Discharge Planning  Goal: Discharge to home or other facility with appropriate resources  Outcome: Progressing     Problem: Chronic Conditions and Co-morbidities  Goal: Patient's chronic conditions and co-morbidity symptoms are monitored and maintained or improved  Outcome: Progressing     Problem: Diabetes  Goal: Achieve decreasing blood glucose levels by end of shift  Outcome: Progressing  Goal: Increase stability of blood glucose readings by end of shift  Outcome: Progressing  Goal: Decrease in ketones present in urine by end of shift  Outcome: Progressing  Goal: Maintain electrolyte levels within acceptable range throughout shift  Outcome: Progressing  Goal: Maintain glucose levels >70mg/dl to <250mg/dl throughout shift  Outcome: Progressing  Goal: No changes in neurological exam by end of shift  Outcome: Progressing  Goal: Learn about and adhere to nutrition recommendations by end of shift  Outcome: Progressing  Goal: Vital signs within normal range for age by end of shift  Outcome: Progressing  Goal: Increase self care and/or family involovement by end of shift  Outcome: Progressing  Goal: Receive DSME education by end of shift  Outcome: Progressing     Problem: Skin  Goal: Decreased wound size/increased tissue granulation at next dressing change  Outcome: Progressing  Goal: Participates in plan/prevention/treatment measures  Outcome: Progressing  Goal: Prevent/manage excess moisture  Outcome:  Progressing  Goal: Prevent/minimize sheer/friction injuries  Outcome: Progressing  Goal: Promote/optimize nutrition  Outcome: Progressing  Goal: Promote skin healing  Outcome: Progressing

## 2024-10-28 NOTE — PROGRESS NOTES
"Pharmacy Medication History Review    Albert Abbott \"Taras\" is a 74 y.o. male admitted for AMS (altered mental status). Pharmacy reviewed the patient's [id number] medications and allergies for accuracy.    Medications ADDED:  Aspirin    Medications CHANGED:  Metformin  Sertraline    Medications REMOVED:   Protonix     The list below reflects the updated PTA list.   Prior to Admission Medications   Prescriptions Last Dose Informant   albuterol 90 mcg/actuation inhaler  Other   Sig: Inhale 2 puffs every 4 hours if needed for wheezing or shortness of breath (cough).   aspirin 81 mg EC tablet  Other   Sig: Take 1 tablet (81 mg) by mouth once daily.   brimonidine (AlphaGAN) 0.2 % ophthalmic solution  Other   Sig: Administer 1 drop into both eyes 2 times a day.   busPIRone (Buspar) 10 mg tablet  Other   Sig: Take 1 tablet (10 mg) by mouth 2 times daily (morning and late afternoon).   carboxymethylcellulose (Refresh Celluvisc) 1 % ophthalmic solution  Other   Sig: Administer 1 drop into both eyes once daily at bedtime.   cetirizine (ZyrTEC) 10 mg tablet  Other   Sig: Take 1 tablet (10 mg) by mouth once daily.   cholecalciferol (Vitamin D3) 50 MCG (2000 UT) tablet  Other   Sig: Take 2 tablets (4,000 Units) by mouth once daily.   glyBURIDE (Diabeta) 5 mg tablet  Other   Sig: Take 2 tablets (10 mg) by mouth once daily in the morning. Take before meals.   levETIRAcetam (Keppra) 500 mg tablet  Other   Sig: Take 1 tablet (500 mg) by mouth 2 times a day.   lisinopril 5 mg tablet  Other   Sig: Take 1 tablet (5 mg) by mouth once daily.   lubricating eye drops (carboxymethylcellulose PF) ophthalmic solution  Other   Sig: Administer 1 drop into both eyes 4 times a day.   metFORMIN (Glucophage) 1,000 mg tablet  Other   Sig: Take 1 tablet (1,000 mg) by mouth 2 times daily (morning and late afternoon).   pioglitazone (Actos) 45 mg tablet  Other   Sig: Take 1 tablet (45 mg) by mouth once daily with breakfast.   risperiDONE " (RisperDAL) 1 mg tablet  Other   Sig: Take 1 tablet (1 mg) by mouth once daily at bedtime.   rosuvastatin (Crestor) 20 mg tablet  Other   Sig: Take 1 tablet (20 mg) by mouth once daily.   sertraline (Zoloft) 100 mg tablet  Other   Sig: Take 2 tablets (200 mg) by mouth once daily.      Facility-Administered Medications: None        The list below reflects the updated allergy list. Please review each documented allergy for additional clarification and justification.  Allergies  Reviewed by Rodrigo Reagan PharmD on 10/28/2024        Severity Reactions Comments    Atorvastatin Low Myalgia Joint Pain   Pharmacy technician Yecenia RODRIGUEZ [facility] allergy [facility]'s record.         Patient was unable to be assessed for M2B at discharge.     Sources:   [facility]  Pharmacy dispense history  Patient interview Unable to provide any details  Spouse, called Danuta Abbott #[phone] but unable to reach to confirm medication list  Chart Review  Care Everywhere  [phone] spoke with pharmacist Yessenia POOL who provided dispense history  (see Admission PTA medication list for dispense history details for each medication. All scheduled medications listed above, except for brimonidine ophth solution and vitamin D3, were filled for 90-day supplies in the last 3 months. Brimonidine ophth solution and vitamin D3 were filled for 90-day supplies in June.)    Additional Comments:  I attempted to speak with patient to confirm home medication list but he was asleep/sedated. I spoke with the patient's RN who informed that the patient has dementia and has been declining in cognitive function. The patient's RN advised to call the patient's spouse to attempt to obtain medication list (see above). Please reach out to Med Rec team to request second attempt.       Rodrigo Reagan PharmD  Transitions of Care Pharmacist  10/28/24     Secure Chat preferred   If no response call [phone]  "or Vocera \"Med Rec\"    "

## 2024-10-28 NOTE — PROGRESS NOTES
"Albert Abbott \"Taras\" is a 74 y.o. male on hospital day 0 of admission presenting with AMS (altered mental status).    Subjective   Patient seen this morning bedside with sitter present.  The patient was noted to be sleeping initially when the writer came into the room. Per the sitter the patient was agitated earlier in the morning when he was trying to get up.  When asked how he is doing he repeated back to me and said how are you doing when asked again by the writer he stated okay.  When asked where he is he stated I am at home when told he is in a hospital he kept repeating the word I am in a hospital in the hospital.  The writer tried asking him other questions but he really could not answer them.  He was able to move his arms and legs on command.  When the writer asked him to touch his hand and do not touch my hand the patient did the opposite of these.    Additional Information:        Objective   No rigidity present in upper limbs very flexible.     Vital Signs:      10/27/2024     6:00 PM 10/27/2024     8:00 PM 10/27/2024    10:42 PM 10/28/2024     1:38 AM 10/28/2024     3:45 AM 10/28/2024     4:31 AM 10/28/2024     8:08 AM   Vitals   Systolic 148 153 156 133 127 128 107   Diastolic 67 79 78 64 63 74 47   Heart Rate 73 85 90 92 74 74 64   Temp     36.6 °C (97.9 °F) 36.6 °C (97.9 °F) 36.4 °C (97.5 °F)   Resp 16 18 18 18 20 20    Height (in)      1.803 m (5' 11\")    Weight (lb)      235.2    BMI      32.8 kg/m2    BSA (m2)      2.32 m2       Intake/Output last 3 Shifts:  No intake/output data recorded.    Mental Status Exam:  General/Appearance: Mild Psychological Distress, appears stated age, in hospital gown, laying in bed, body habitus: obese, grooming/hygiene is reasonable for setting, bald, IV in place  Attitude/Behavior: engages in interview, cooperative, appropriate eye contact, calm  Motor Activity: no psychomotor agitation/retardation, no tics/tremors, no EPS/TD, gait: not assessed  Mood: " "\"ok\"  Affect: Quality-mood congruent, Intensity-blunted, Range-restricted to lower range  Speech:  slow rate, normal rhythm, low tone, lower volume, staccato prosody  Thought Process: linear, perseverative, concrete  Thought Content: unable to assess, Delusional thinking: unable to assess  Thought Perception: unable to assess  Cognition: alert & oriented x 0  Insight: impaired  Judgment: impaired    Current Medications  Scheduled   aspirin, 81 mg, oral, Daily  busPIRone, 10 mg, oral, BID  cholecalciferol, 4,000 Units, oral, Daily  enoxaparin, 40 mg, subcutaneous, q24h  folic acid, 1 mg, oral, Daily  insulin lispro, 0-10 Units, subcutaneous, TID  levETIRAcetam, 500 mg, oral, BID  [Held by provider] lisinopril, 5 mg, oral, Daily  pantoprazole, 40 mg, oral, Daily  polyethylene glycol, 17 g, oral, Daily  [Held by provider] risperiDONE, 1 mg, oral, Nightly  rosuvastatin, 20 mg, oral, Daily  sennosides, 2 tablet, oral, BID  sertraline, 100 mg, oral, BID  [START ON 10/31/2024] thiamine, 100 mg, oral, Daily  thiamine, 500 mg, intravenous, TID      Continuous      PRN   PRN medications: albuterol     Labs  Results for orders placed or performed during the hospital encounter of 10/28/24 (from the past 24 hours)   Drug Screen, Urine   Result Value Ref Range    Amphetamine Screen, Urine Presumptive Negative Presumptive Negative    Barbiturate Screen, Urine Presumptive Negative Presumptive Negative    Benzodiazepines Screen, Urine Presumptive Positive (A) Presumptive Negative    Cannabinoid Screen, Urine Presumptive Negative Presumptive Negative    Cocaine Metabolite Screen, Urine Presumptive Negative Presumptive Negative    Fentanyl Screen, Urine Presumptive Negative Presumptive Negative    Opiate Screen, Urine Presumptive Negative Presumptive Negative    Oxycodone Screen, Urine Presumptive Negative Presumptive Negative    PCP Screen, Urine Presumptive Negative Presumptive Negative    Methadone Screen, Urine Presumptive " Negative Presumptive Negative   TSH with reflex to Free T4 if abnormal   Result Value Ref Range    Thyroid Stimulating Hormone 1.10 0.44 - 3.98 mIU/L   Levetiracetam   Result Value Ref Range    Keppra 10 10 - 40 ug/mL   CBC   Result Value Ref Range    WBC 9.4 4.4 - 11.3 x10*3/uL    nRBC 0.0 0.0 - 0.0 /100 WBCs    RBC 3.51 (L) 4.50 - 5.90 x10*6/uL    Hemoglobin 11.0 (L) 13.5 - 17.5 g/dL    Hematocrit 34.8 (L) 41.0 - 52.0 %    MCV 99 80 - 100 fL    MCH 31.3 26.0 - 34.0 pg    MCHC 31.6 (L) 32.0 - 36.0 g/dL    RDW 13.5 11.5 - 14.5 %    Platelets 178 150 - 450 x10*3/uL   Comprehensive metabolic panel   Result Value Ref Range    Glucose 180 (H) 74 - 99 mg/dL    Sodium 141 136 - 145 mmol/L    Potassium 4.0 3.5 - 5.3 mmol/L    Chloride 106 98 - 107 mmol/L    Bicarbonate 27 21 - 32 mmol/L    Anion Gap 12 10 - 20 mmol/L    Urea Nitrogen 21 6 - 23 mg/dL    Creatinine 1.01 0.50 - 1.30 mg/dL    eGFR 77 >60 mL/min/1.73m*2    Calcium 9.1 8.6 - 10.6 mg/dL    Albumin 4.2 3.4 - 5.0 g/dL    Alkaline Phosphatase 61 33 - 136 U/L    Total Protein 6.6 6.4 - 8.2 g/dL    AST 22 9 - 39 U/L    Bilirubin, Total 0.5 0.0 - 1.2 mg/dL    ALT 11 10 - 52 U/L   POCT GLUCOSE   Result Value Ref Range    POCT Glucose 171 (H) 74 - 99 mg/dL        Imaging  ECG 12 lead    Result Date: 10/28/2024  Sinus rhythm with 1st degree AV block with Premature atrial complexes in a pattern of bigeminy Nonspecific ST and T wave abnormality Abnormal ECG When compared with ECG of 21-FEB-2024 19:00, Premature atrial complexes are now Present Nonspecific T wave abnormality, worse in Inferior leads    CT brain attack angio head and neck W and WO IV contrast    Result Date: 10/27/2024  Interpreted By:  aSmantha Reich, STUDY: CT BRAIN ATTACK ANGIO HEAD AND NECK W AND WO IV CONTRAST;  10/27/2024 11:32 am   INDICATION: Signs/Symptoms:ams, confusion.     COMPARISON: None.   ACCESSION NUMBER(S): [id number]   ORDERING CLINICIAN: GANGA BARTON   TECHNIQUE: 100 ML of Omnipaque 350  was administered intravenously and axial images of the head and neck were acquired.  Coronal, sagittal, and 3-D reconstructions were provided for review.   FINDINGS:     CTA HEAD FINDINGS:   Anterior circulation: The bilateral intracranial internal carotid arteries, bilateral carotid terminals, bilateral proximal anterior and middle cerebral arteries are patent. There is mild narrowing within the distal M1 segment on the left.   Posterior circulation: Bilateral intracranial vertebral arteries, vertebrobasilar junction, basilar artery and proximal posterior cerebral arteries are patent. The left vertebral artery is dominant. There is atherosclerotic calcification along the left intradural vertebral artery without significant stenosis. The right intradural vertebral artery is diminutive. There is nonvisualization of the proximal right intradural vertebral artery. There is reconstitution of the right intradural vertebral artery proximal to the vertebrobasilar junction.   CTA NECK FINDINGS:   No stenosis at the origins of the great vessels.   Right carotid vessels: The common carotid artery is patent. Mild atherosclerotic calcification at the carotid bifurcation without stenosis. The internal carotid artery in the neck is patent without stenosis.   Left carotid vessels: The common carotid artery is patent. Mild atherosclerotic calcification at the carotid bifurcation without stenosis by NASCET criteria. The internal carotid artery in the neck is patent without significant stenosis.   Vertebral vessels:  The visualized segments of the cervical vertebral arteries are patent. The left vertebral artery is dominant. The non dominant right vertebral artery is markedly diminutive.   Fatty atrophy are postsurgical changes of the left parotid gland. Debris within the external auditory canals likely represents cerumen.       CTA neck:   No evidence for significant stenosis of the cervical vessels.   Mild atherosclerotic  calcification at the carotid bifurcation bilaterally without stenosis.   CTA head:   There is mild narrowing of the distal M1 segment on the left.   The proximal right intradural vertebral artery is not visualized. There is reconstitution proximal to the vertebrobasilar junction.   Otherwise, no evidence for significant stenosis or large branch vessel cutoffs of the intracranial vessels.   MACRO: None   Signed by: Samantha Reich 10/27/2024 11:44 AM Dictation workstation:   [id number]    XR chest 1 view    Result Date: 10/27/2024  STUDY: Chest Radiograph;  10/27/2024 11:31 INDICATION: Altered mental status. COMPARISON: 2/21/2024 XR Chest ACCESSION NUMBER(S): [id number] ORDERING CLINICIAN: GANGA BARTON TECHNIQUE:  Frontal chest was obtained at 11:31 hours. FINDINGS: CARDIOMEDIASTINAL SILHOUETTE: Cardiomediastinal silhouette is normal in size and configuration.  LUNGS: There is prominence of the pulmonary vascularity with mild bilateral interstitial filtrates.  ABDOMEN: No remarkable upper abdominal findings.  BONES: No acute osseous changes.    Mild pulmonary edema. Signed by Albert Mariee MD    CT brain attack head wo IV contrast    Result Date: 10/27/2024  Interpreted By:  Darci Stratton, STUDY: CT BRAIN ATTACK HEAD WO IV CONTRAST  10/27/2024 11:13 am   INDICATION: Signs/Symptoms:Stroke Evaluation   COMPARISON: 05/05/2023   ACCESSION NUMBER(S): [id number]   ORDERING CLINICIAN: GANGA BARTON   TECHNIQUE: Contiguous axial CT images of the brain were obtained without IV contrast.   FINDINGS: The ventricles, cisterns and sulci are prominent, consistent with moderate diffuse volume loss. Areas of white matter low attenuation are nonspecific but likely related to chronic microvascular disease. There is intracranial atherosclerosis.   Gray-white differentiation is preserved. No acute intracranial hemorrhage or mass effect. No midline shift. Patent basal cisterns. No extraaxial fluid collections.   The calvaria is intact.  "The visualized paranasal sinuses and mastoid air cells are clear.       No acute intracranial pathology.     MACRO: Darci Stratton discussed the significance and urgency of this critical finding by telephone with  GANGA BARTON on 10/27/2024 at 11:20 am. (**-RCF-**) Findings:  See findings.     Signed by: Darci Stratton 10/27/2024 11:20 AM Dictation workstation:   [id number]      Psychiatric Risk Assessment  Acute Risk of Harm to Others is Considered: Low  Acute Risk of Harm to Self is Considered: Low    Assessment:  Albert Abbott \"Taras\" is a 74 y.o. male with a past psychiatric history of PTSD and dementia and a past medical history of T2DM. EPAT was consulted for altered mental status when his wife called 911 because he was unable to recognize her. In the ED, he received lorazapem 2 mg for concern that he was catatonic, although there was no documentation of specific catatonic findings or how they changed with administration of lorazepam. Per conversation with his nurse, however, pt became more aggressive after he received lorazepam, and was then given an additional 2 mg IV (for a total of 4 mg lorazepam during ED visit), which made him calmer. Per conversation with pt's physician, however, pt's catalepsy reportedly improved after the first administration of lorazepam, so reports of catatonic sx after lorazepam are conflicting.    10/28: Patient seen this morning.  Largely delirious and limited interview.  Notably the patient did do the opposite when the writer asked him to not touch his hand and then touch his hand.  However there was no significant rigidity present.  Overall patient's current presentation does not significantly look to be catatonia.  Rather the patient seems to have hypoactive delirium.  It is possible that when the patient previously received the lorazepam this disinhibited him and caused him to start to talk and speak.  The writer largely believes with further medical workup and investigation " of the underlying cause of his delirium he hopefully should improve.  It was mentioned about a possible neurocognitive disorder unclear what exactly that diagnosis was based upon however this may be something to consider in the future. We will recommend Haldol 0.25mg PO TID PRN for agitation in case he needs this.     Impression:  #Delirium - hypoactive type  #r/o major neurocognitive disorder   #PTSD, per chart  #MDD, per chart     Recommendations:  Safety/Monitoring:  Patient does not meet criteria for inpatient psychiatric admission  Patient does require 1:1 observation, despite enhanced safety features  Daily interdisciplinary safety and planning huddle with Psychiatry  Video monitoring as with all patients on the MPU  Psychiatry will follow patient daily while on the MPU    Medications:  - Cont Sertraline 100mg PO BID  - Initiate Haldol 0.25mg PO Q8 PRN for agitation    Considerations:  Therapies recommended:  will continue to assess for benefit and meaningful participation    Delirium Guidelines  Provide glasses, hearing aids and/or communication boards as needed for impairments  Frequent reorientation, minimize room and staff changes  Open blinds during the day, dark/quiet room at night   Minimal interruptions and daytime naps  Early evaluation and intervention by PT, out of bed as tolerated  Minimize use of restraints   Minimize use of benzodiazepines, anticholinergic medications, and opiates (while ensuring adequate treatment of pain)  Keep Mg>2, K>4 (as able)  Ensure regular bowel and bladder function (as able)    Disposition/Discharge Planning:  SW following, appreciate assistance      Multidisciplinary Rounding  Consulting Physician, Fellow, Medical Student, Charge Nurse, Nurse, Pharmacist, Social Work, Care Coordinator, Dietician, Music Therapy, Art Therapy, and Recreational Therapy    Medication Consent  N/A - Consult Service    John Barber MD   PGY-5 Consult Liaison Psychiatry Fellow.

## 2024-10-28 NOTE — NURSING NOTE
0930 Swallow Eval Performed---Patient able to tolerate water with straw and applesauce. Pills administered with applesauce.

## 2024-10-29 LAB
ALBUMIN SERPL BCP-MCNC: 4.1 G/DL (ref 3.4–5)
AMMONIA PLAS-SCNC: 35 UMOL/L (ref 16–53)
ANA PATTERN: ABNORMAL
ANA SER QL HEP2 SUBST: POSITIVE
ANA TITR SER IF: ABNORMAL {TITER}
ANION GAP SERPL CALC-SCNC: 15 MMOL/L (ref 10–20)
BASOPHILS # BLD AUTO: 0.04 X10*3/UL (ref 0–0.1)
BASOPHILS NFR BLD AUTO: 0.6 %
BUN SERPL-MCNC: 18 MG/DL (ref 6–23)
CALCIUM SERPL-MCNC: 9.6 MG/DL (ref 8.6–10.6)
CENTROMERE B AB SER-ACNC: <0.2 AI
CHLORIDE SERPL-SCNC: 103 MMOL/L (ref 98–107)
CHROMATIN AB SERPL-ACNC: <0.2 AI
CO2 SERPL-SCNC: 27 MMOL/L (ref 21–32)
CREAT SERPL-MCNC: 1.01 MG/DL (ref 0.5–1.3)
DSDNA AB SER-ACNC: <1 IU/ML
EGFRCR SERPLBLD CKD-EPI 2021: 77 ML/MIN/1.73M*2
ENA JO1 AB SER QL IA: <0.2 AI
ENA RNP AB SER IA-ACNC: <0.2 AI
ENA SCL70 AB SER QL IA: <0.2 AI
ENA SM AB SER IA-ACNC: <0.2 AI
ENA SM+RNP AB SER QL IA: <0.2 AI
ENA SS-A AB SER IA-ACNC: 0.2 AI
ENA SS-B AB SER IA-ACNC: <0.2 AI
EOSINOPHIL # BLD AUTO: 0.12 X10*3/UL (ref 0–0.4)
EOSINOPHIL NFR BLD AUTO: 1.7 %
ERYTHROCYTE [DISTWIDTH] IN BLOOD BY AUTOMATED COUNT: 13.4 % (ref 11.5–14.5)
ETHANOL SERPL-MCNC: <10 MG/DL
GLUCOSE BLD MANUAL STRIP-MCNC: 230 MG/DL (ref 74–99)
GLUCOSE BLD MANUAL STRIP-MCNC: 303 MG/DL (ref 74–99)
GLUCOSE SERPL-MCNC: 227 MG/DL (ref 74–99)
HCT VFR BLD AUTO: 36.4 % (ref 41–52)
HGB BLD-MCNC: 11.8 G/DL (ref 13.5–17.5)
IMM GRANULOCYTES # BLD AUTO: 0.03 X10*3/UL (ref 0–0.5)
IMM GRANULOCYTES NFR BLD AUTO: 0.4 % (ref 0–0.9)
LYMPHOCYTES # BLD AUTO: 1.39 X10*3/UL (ref 0.8–3)
LYMPHOCYTES NFR BLD AUTO: 19.3 %
MAGNESIUM SERPL-MCNC: 1.95 MG/DL (ref 1.6–2.4)
MCH RBC QN AUTO: 31.1 PG (ref 26–34)
MCHC RBC AUTO-ENTMCNC: 32.4 G/DL (ref 32–36)
MCV RBC AUTO: 96 FL (ref 80–100)
MONOCYTES # BLD AUTO: 0.64 X10*3/UL (ref 0.05–0.8)
MONOCYTES NFR BLD AUTO: 8.9 %
NEUTROPHILS # BLD AUTO: 4.97 X10*3/UL (ref 1.6–5.5)
NEUTROPHILS NFR BLD AUTO: 69.1 %
NRBC BLD-RTO: 0 /100 WBCS (ref 0–0)
PHOSPHATE SERPL-MCNC: 2.8 MG/DL (ref 2.5–4.9)
PLATELET # BLD AUTO: 204 X10*3/UL (ref 150–450)
POTASSIUM SERPL-SCNC: 3.9 MMOL/L (ref 3.5–5.3)
RBC # BLD AUTO: 3.8 X10*6/UL (ref 4.5–5.9)
RIBOSOMAL P AB SER-ACNC: <0.2 AI
SODIUM SERPL-SCNC: 141 MMOL/L (ref 136–145)
WBC # BLD AUTO: 7.2 X10*3/UL (ref 4.4–11.3)

## 2024-10-29 PROCEDURE — 85025 COMPLETE CBC W/AUTO DIFF WBC: CPT

## 2024-10-29 PROCEDURE — 99233 SBSQ HOSP IP/OBS HIGH 50: CPT | Performed by: PSYCHIATRY & NEUROLOGY

## 2024-10-29 PROCEDURE — 36415 COLL VENOUS BLD VENIPUNCTURE: CPT

## 2024-10-29 PROCEDURE — 80069 RENAL FUNCTION PANEL: CPT

## 2024-10-29 PROCEDURE — 2500000001 HC RX 250 WO HCPCS SELF ADMINISTERED DRUGS (ALT 637 FOR MEDICARE OP)

## 2024-10-29 PROCEDURE — 99233 SBSQ HOSP IP/OBS HIGH 50: CPT | Performed by: STUDENT IN AN ORGANIZED HEALTH CARE EDUCATION/TRAINING PROGRAM

## 2024-10-29 PROCEDURE — 82947 ASSAY GLUCOSE BLOOD QUANT: CPT

## 2024-10-29 PROCEDURE — 2500000004 HC RX 250 GENERAL PHARMACY W/ HCPCS (ALT 636 FOR OP/ED)

## 2024-10-29 PROCEDURE — 82077 ASSAY SPEC XCP UR&BREATH IA: CPT

## 2024-10-29 PROCEDURE — 83735 ASSAY OF MAGNESIUM: CPT

## 2024-10-29 PROCEDURE — 2500000002 HC RX 250 W HCPCS SELF ADMINISTERED DRUGS (ALT 637 FOR MEDICARE OP, ALT 636 FOR OP/ED)

## 2024-10-29 PROCEDURE — 1100000001 HC PRIVATE ROOM DAILY

## 2024-10-29 PROCEDURE — 82140 ASSAY OF AMMONIA: CPT

## 2024-10-29 ASSESSMENT — PAIN SCALES - GENERAL
PAINLEVEL_OUTOF10: 0 - NO PAIN
PAINLEVEL_OUTOF10: 0 - NO PAIN

## 2024-10-29 ASSESSMENT — COLUMBIA-SUICIDE SEVERITY RATING SCALE - C-SSRS
2. HAVE YOU ACTUALLY HAD ANY THOUGHTS OF KILLING YOURSELF?: NO
1. SINCE LAST CONTACT, HAVE YOU WISHED YOU WERE DEAD OR WISHED YOU COULD GO TO SLEEP AND NOT WAKE UP?: NO
6. HAVE YOU EVER DONE ANYTHING, STARTED TO DO ANYTHING, OR PREPARED TO DO ANYTHING TO END YOUR LIFE?: NO

## 2024-10-29 NOTE — PROGRESS NOTES
"Physical Therapy                 Therapy Communication Note    Patient Name: Albert Abbott \"Bill\"  MRN: [id number]  Department: [facility]  Room: [address] [place] [address]  Today's Date: 10/29/2024     Discipline: Physical Therapy    Missed Visit Reason: Missed Visit Reason: Patient refused (Pt supine in bed upon arrival refused to participate. Encouagement provided to sit EOB, pt continues to decline.)    Missed Time: 12:52 PM      "

## 2024-10-29 NOTE — CARE PLAN
The patient's goals for the shift include  speaking with Aga    The clinical goals for the shift include Pt will remain free from falls and injury throughout shift    Throughout shift Pt was calm and cooperative with care. Bed locked and low, call light within reach, video monitoring ongoing with  at monitor. Pt observer discontinued, bed alarm set.    Problem: Pain - Adult  Goal: Verbalizes/displays adequate comfort level or baseline comfort level  Outcome: Progressing     Problem: Safety - Adult  Goal: Free from fall injury  Outcome: Progressing     Problem: Discharge Planning  Goal: Discharge to home or other facility with appropriate resources  Outcome: Progressing     Problem: Chronic Conditions and Co-morbidities  Goal: Patient's chronic conditions and co-morbidity symptoms are monitored and maintained or improved  Outcome: Progressing     Problem: Diabetes  Goal: Achieve decreasing blood glucose levels by end of shift  Outcome: Progressing  Goal: Increase stability of blood glucose readings by end of shift  Outcome: Progressing     Problem: Skin  Goal: Decreased wound size/increased tissue granulation at next dressing change  Outcome: Progressing  Flowsheets (Taken 10/29/2024 1000)  Decreased wound size/increased tissue granulation at next dressing change: Promote sleep for wound healing  Goal: Participates in plan/prevention/treatment measures  Outcome: Progressing  Flowsheets (Taken 10/29/2024 1000)  Participates in plan/prevention/treatment measures: Elevate heels  Goal: Prevent/manage excess moisture  Outcome: Progressing  Flowsheets (Taken 10/29/2024 1000)  Prevent/manage excess moisture: Cleanse incontinence/protect with barrier cream  Goal: Prevent/minimize sheer/friction injuries  Outcome: Progressing  Flowsheets (Taken 10/29/2024 1000)  Prevent/minimize sheer/friction injuries:   Use pull sheet   Increase activity/out of bed for meals  Goal: Promote/optimize nutrition  Outcome:  Progressing  Flowsheets (Taken 10/29/2024 1000)  Promote/optimize nutrition: Consume > 50% meals/supplements  Goal: Promote skin healing  Outcome: Progressing     Problem: Fall/Injury  Goal: Not fall by end of shift  Outcome: Progressing  Goal: Be free from injury by end of the shift  Outcome: Progressing

## 2024-10-29 NOTE — PROGRESS NOTES
Transitional Care Coordination Progress Note:  Patient was discussed during interdisciplinary rounds.  Team members present: medical team, SW and TCC.  Plan per medical team: Pt is medically ready for discharge. Pt does not meet criteria for inpatient psych.  Payer: Medicare A/B, [employer] Medicare Supplement.  Status: Inpatient  Discharge disposition: PT and OT are recommending moderate intensity; pt and wife are agreeable.  Pt's wife requested a facility close to home as she is not able to drive at night.  Reviewed [facility] SNF locations close to pt's home, sent referral to 7 SNFs via [facility].  List of SNFs were emailed to pt's wife [email or web address].  Potential barriers: None  ADOD: 10/30  Care coordinator will continue to follow for discharge planning needs.     Josie Becerra MSN, RN-BC  Transitional Care Coordinator (TCC)  [phone]

## 2024-10-29 NOTE — PROGRESS NOTES
"Albert Abbott \"Taras\" is a 74 y.o. male on hospital day 1 of admission presenting with AMS (altered mental status).    Subjective   Patient seen this morning bedside with medical student present.  Patient said he was feeling normal when asked.  When asked if he knows why he is in the hospital he states he does not really know too much.  When asked what he does most of the time he states pretty much watch TV I went through all my hobbies.  When asked regarding depression and anxiety he states I do not have depression but I do have anxiety and I take some medications for it.  He was unable to name the medications he takes when asked.  The writer asked the patient regarding the alcohol level he had on admission.  When asked he states I do not think I drank, we have got alcohol in the house but I have not drank in 10 years.  The writer asked if it would be all possible and the patient stated it was possible but he felt that this was not likely the case.  When asked regarding SI HI AVH she is denies these.  He was asked regarding time, place, situation, and the patient knew all of the above.  He also knew the days of the week backwards and the current president    Additional Information:    Patient had a good night with no issues or concerns     Spoke with patient's wife Aga.  Asked patient's wife Aga regarding the blood alcohol level that the patient had on admission.  Aga stated she did not think he drank but did acknowledge that they had liquor in the house and it would be available to him and he could have taken it out of the bottles.  She did say it would be very unlike him but again with his dementia she is not sure if this occurred.  The writer talked to Aga that in patients with dementia alcohol can have a profound lasting effect even at small doses and can take longer to break down meaning the effect can be much more pronounced even with a small amount of alcohol.  The patient's wife stated she understood. " " The patient's wife asked more of the plan regarding where the patient would be going the writer told her this is better answered by the  but from his knowledge he will be going [facility].  The patient's wife understood she did say though at her house she had bedbugs in which [facility] so she would prefer if he does not go to one specific facility.  The writer stated he would pass this information along to the patient's .    Objective   No rigidity present in upper limbs very flexible.     Vital Signs:      10/28/2024     3:45 AM 10/28/2024     4:31 AM 10/28/2024     8:08 AM 10/28/2024     3:43 PM 10/28/2024     7:00 PM 10/28/2024    11:25 PM 10/29/2024     8:03 AM   Vitals   Systolic 127 128 107 143 146 117 131   Diastolic 63 74 47 73 71 52 55   Heart Rate 74 74 64  71 58 55   Temp 36.6 °C (97.9 °F) 36.6 °C (97.9 °F) 36.4 °C (97.5 °F) 36.2 °C (97.2 °F) 36.4 °C (97.5 °F) 36.6 °C (97.9 °F)    Resp 20 20   18 17 18   Height (in)  1.803 m (5' 11\")        Weight (lb)  235.2        BMI  32.8 kg/m2        BSA (m2)  2.32 m2           Intake/Output last 3 Shifts:  I/O last 3 completed shifts:  In: 75 (0.7 mL/kg) [P.O.:75]  Out: 400 (3.7 mL/kg) [Urine:400 (0.1 mL/kg/hr)]  Weight: 106.7 kg     Mental Status Exam:  General/Appearance: Mild Psychological Distress, appears stated age, in hospital gown, laying in bed, body habitus: obese, grooming/hygiene is reasonable for setting, bald, IV in place  Attitude/Behavior: engages in interview, cooperative, appropriate eye contact, calm  Motor Activity: no psychomotor agitation/retardation, no tics/tremors, no EPS/TD, gait: not assessed  Mood: \"ok\"  Affect: Quality-mood congruent, Intensity-blunted, Range-full  Speech: normal rate/tone/volume/prosody/syntax  Thought Process: linear, circumstantial, organized  Thought Content: denies SI/HI, Delusional thinking: " none elicited  Thought Perception: denies AVH  Cognition: alert & oriented x 4, no deficits in attention/concentration noted, good fund of knowledge, recent and remote memory intact  Insight: fair  Judgment: fair    Current Medications  Scheduled   aspirin, 81 mg, oral, Daily  busPIRone, 10 mg, oral, BID  cholecalciferol, 4,000 Units, oral, Daily  enoxaparin, 40 mg, subcutaneous, q24h  folic acid, 1 mg, oral, Daily  insulin lispro, 0-10 Units, subcutaneous, TID  levETIRAcetam, 500 mg, oral, BID  [Held by provider] lisinopril, 5 mg, oral, Daily  pantoprazole, 40 mg, oral, Daily  polyethylene glycol, 17 g, oral, Daily  [Held by provider] risperiDONE, 1 mg, oral, Nightly  rosuvastatin, 20 mg, oral, Daily  sennosides, 2 tablet, oral, BID  sertraline, 100 mg, oral, BID  [START ON 10/31/2024] thiamine, 100 mg, oral, Daily  thiamine, 500 mg, intravenous, TID      Continuous      PRN   PRN medications: albuterol, haloperidol     Labs  Results for orders placed or performed during the hospital encounter of 10/28/24 (from the past 24 hours)   POCT GLUCOSE   Result Value Ref Range    POCT Glucose 171 (H) 74 - 99 mg/dL   POCT GLUCOSE   Result Value Ref Range    POCT Glucose 153 (H) 74 - 99 mg/dL   POCT GLUCOSE   Result Value Ref Range    POCT Glucose 147 (H) 74 - 99 mg/dL        Imaging  ECG 12 lead    Result Date: 10/28/2024  Sinus rhythm with 1st degree AV block with Premature atrial complexes in a pattern of bigeminy Nonspecific ST and T wave abnormality Abnormal ECG When compared with ECG of 21-FEB-2024 19:00, Premature atrial complexes are now Present Nonspecific T wave abnormality, worse in Inferior leads    CT brain attack angio head and neck W and WO IV contrast    Result Date: 10/27/2024  Interpreted By:  Samantha Reich, STUDY: CT BRAIN ATTACK ANGIO HEAD AND NECK W AND WO IV CONTRAST;  10/27/2024 11:32 am   INDICATION: Signs/Symptoms:ams, confusion.     COMPARISON: None.   ACCESSION NUMBER(S): [id number]   ORDERING  CLINICIAN: GANGA BARTON   TECHNIQUE: 100 ML of Omnipaque 350 was administered intravenously and axial images of the head and neck were acquired.  Coronal, sagittal, and 3-D reconstructions were provided for review.   FINDINGS:     CTA HEAD FINDINGS:   Anterior circulation: The bilateral intracranial internal carotid arteries, bilateral carotid terminals, bilateral proximal anterior and middle cerebral arteries are patent. There is mild narrowing within the distal M1 segment on the left.   Posterior circulation: Bilateral intracranial vertebral arteries, vertebrobasilar junction, basilar artery and proximal posterior cerebral arteries are patent. The left vertebral artery is dominant. There is atherosclerotic calcification along the left intradural vertebral artery without significant stenosis. The right intradural vertebral artery is diminutive. There is nonvisualization of the proximal right intradural vertebral artery. There is reconstitution of the right intradural vertebral artery proximal to the vertebrobasilar junction.   CTA NECK FINDINGS:   No stenosis at the origins of the great vessels.   Right carotid vessels: The common carotid artery is patent. Mild atherosclerotic calcification at the carotid bifurcation without stenosis. The internal carotid artery in the neck is patent without stenosis.   Left carotid vessels: The common carotid artery is patent. Mild atherosclerotic calcification at the carotid bifurcation without stenosis by NASCET criteria. The internal carotid artery in the neck is patent without significant stenosis.   Vertebral vessels:  The visualized segments of the cervical vertebral arteries are patent. The left vertebral artery is dominant. The non dominant right vertebral artery is markedly diminutive.   Fatty atrophy are postsurgical changes of the left parotid gland. Debris within the external auditory canals likely represents cerumen.       CTA neck:   No evidence for significant  stenosis of the cervical vessels.   Mild atherosclerotic calcification at the carotid bifurcation bilaterally without stenosis.   CTA head:   There is mild narrowing of the distal M1 segment on the left.   The proximal right intradural vertebral artery is not visualized. There is reconstitution proximal to the vertebrobasilar junction.   Otherwise, no evidence for significant stenosis or large branch vessel cutoffs of the intracranial vessels.   MACRO: None   Signed by: Samantha Reich 10/27/2024 11:44 AM Dictation workstation:   [id number]    XR chest 1 view    Result Date: 10/27/2024  STUDY: Chest Radiograph;  10/27/2024 11:31 INDICATION: Altered mental status. COMPARISON: 2/21/2024 XR Chest ACCESSION NUMBER(S): [id number] ORDERING CLINICIAN: GANGA BARTON TECHNIQUE:  Frontal chest was obtained at 11:31 hours. FINDINGS: CARDIOMEDIASTINAL SILHOUETTE: Cardiomediastinal silhouette is normal in size and configuration.  LUNGS: There is prominence of the pulmonary vascularity with mild bilateral interstitial filtrates.  ABDOMEN: No remarkable upper abdominal findings.  BONES: No acute osseous changes.    Mild pulmonary edema. Signed by Albert Mariee MD    CT brain attack head wo IV contrast    Result Date: 10/27/2024  Interpreted By:  Darci Stratton, STUDY: CT BRAIN ATTACK HEAD WO IV CONTRAST  10/27/2024 11:13 am   INDICATION: Signs/Symptoms:Stroke Evaluation   COMPARISON: 05/05/2023   ACCESSION NUMBER(S): [id number]   ORDERING CLINICIAN: GANGA BARTON   TECHNIQUE: Contiguous axial CT images of the brain were obtained without IV contrast.   FINDINGS: The ventricles, cisterns and sulci are prominent, consistent with moderate diffuse volume loss. Areas of white matter low attenuation are nonspecific but likely related to chronic microvascular disease. There is intracranial atherosclerosis.   Gray-white differentiation is preserved. No acute intracranial hemorrhage or mass effect. No midline shift. Patent basal cisterns.  "No extraaxial fluid collections.   The calvaria is intact. The visualized paranasal sinuses and mastoid air cells are clear.       No acute intracranial pathology.     MACRO: Darci Stratton discussed the significance and urgency of this critical finding by telephone with  GANGA BARTON on 10/27/2024 at 11:20 am. (**-RCF-**) Findings:  See findings.     Signed by: Darci Stratton 10/27/2024 11:20 AM Dictation workstation:   [id number]      Psychiatric Risk Assessment  Acute Risk of Harm to Others is Considered: Low  Acute Risk of Harm to Self is Considered: Low    Assessment:  Albert Abbott \"Taras\" is a 74 y.o. male with a past psychiatric history of PTSD and dementia and a past medical history of T2DM. EPAT was consulted for altered mental status when his wife called 911 because he was unable to recognize her. In the ED, he received lorazapem 2 mg for concern that he was catatonic, although there was no documentation of specific catatonic findings or how they changed with administration of lorazepam. Per conversation with his nurse, however, pt became more aggressive after he received lorazepam, and was then given an additional 2 mg IV (for a total of 4 mg lorazepam during ED visit), which made him calmer. Per conversation with pt's physician, however, pt's catalepsy reportedly improved after the first administration of lorazepam, so reports of catatonic sx after lorazepam are conflicting.    10/29: Patient seen this morning.  Overall significantly improved compared to previous days, no longer really delirious although may be slightly at times for the most part doing much better.  He knew the person place, daytime situation and what was going on and he was able to converse in a normal manner with the writer.  The writer believes the patient is likely at baseline.  We continued to be still slightly unclear about what exactly caused his current presentation however given the raised ELIZABETH on admission the patient may have " drank alcohol.  Which would have had a more significant effect on him given his history of dementia.  The patient may also not remember drinking alcohol.  We spoke to the wife about moving the alcohol to another area of the house to try make sure this does not happen again.  Medical team is continuing to work him up for other causes of the raised BAC however most likely is that the patient drank alcohol.  No medication changes at this time    Impression:  #Delirium - hypoactive type  #r/o major neurocognitive disorder   #PTSD, per chart  #MDD, per chart     Recommendations:  Safety/Monitoring:  Patient does not meet criteria for inpatient psychiatric admission  Patient does not require 1:1 observation from a psychiatric perspective, defer to primary team  Daily interdisciplinary safety and planning huddle with Psychiatry  Video monitoring as with all patients on the MPU  Psychiatry will follow patient daily while on the MPU    Medications:  - Cont Sertraline 100mg PO BID  - Cont Buspar 10mg BID  - Cont Haldol 0.25mg PO Q8 PRN for agitation    Considerations:  Therapies recommended:  will continue to assess for benefit and meaningful participation    Delirium Guidelines  Provide glasses, hearing aids and/or communication boards as needed for impairments  Frequent reorientation, minimize room and staff changes  Open blinds during the day, dark/quiet room at night   Minimal interruptions and daytime naps  Early evaluation and intervention by PT, out of bed as tolerated  Minimize use of restraints   Minimize use of benzodiazepines, anticholinergic medications, and opiates (while ensuring adequate treatment of pain)  Keep Mg>2, K>4 (as able)  Ensure regular bowel and bladder function (as able)    Disposition/Discharge Planning:  SW following, appreciate assistance      Multidisciplinary Rounding  Consulting Physician, Fellow, Medical Student, Charge Nurse, Nurse, Pharmacist, Social Work, Care Coordinator, Dietician,  Music Therapy, Art Therapy, and Recreational Therapy    Medication Consent  N/A - Consult Service    John Barber MD   PGY-5 Consult Liaison Psychiatry Fellow.

## 2024-10-29 NOTE — PROGRESS NOTES
"Occupational Therapy                 Therapy Communication Note    Patient Name: Albert Abbott \"Bill\"  MRN: [id number]  Department: [facility]  Room: [address] [place] [address]  Today's Date: 10/29/2024     Discipline: Occupational Therapy    Missed Visit Reason: Missed Visit Reason: Patient refused    Missed Time: 1250    .  "

## 2024-10-29 NOTE — PROGRESS NOTES
"Albert Abbott \"Kamlesh" is a 74 y.o. male on day 1 of admission presenting with AMS (altered mental status).    Subjective   No acute overnight events.     Patient was alert and oriented to name, place, time (month and year), and presidents name. He was also aware that he did not remember anything about Sunday or Monday. He was engaged in our conversation and appeared to be back to baseline compared to yesterday. He understood his circumstance and even asked what may have caused his altered mental status. He stated that he does not get out of bed consistently because it is a challenge to go to the movies or dinner.     He states that he has been eating, having bowel movements, and voiding without issue.        Objective     Physical Exam  Constitutional:       General: He is not in acute distress.     Appearance: He is not ill-appearing.   HENT:      Head: Normocephalic and atraumatic.      Mouth/Throat:      Mouth: Mucous membranes are moist.   Cardiovascular:      Rate and Rhythm: Normal rate and regular rhythm.      Heart sounds: No murmur heard.     No gallop.   Pulmonary:      Effort: Pulmonary effort is normal. No respiratory distress.      Breath sounds: No stridor. No wheezing or rales.   Abdominal:      General: There is no distension.      Palpations: Abdomen is soft.      Tenderness: There is no abdominal tenderness.   Musculoskeletal:      Right lower leg: No edema.      Left lower leg: No edema.   Skin:     General: Skin is warm.      Capillary Refill: Capillary refill takes less than 2 seconds.   Neurological:      General: No focal deficit present.      Comments: Oriented to self, location, time, and circumstance.         Last Recorded Vitals  Blood pressure 131/55, pulse 55, temperature 36.6 °C (97.9 °F), resp. rate 18, height 1.803 m (5' 11\"), weight 107 kg (235 lb 3.2 oz), SpO2 99%.  Intake/Output last 3 Shifts:  I/O last 3 completed shifts:  In: 75 (0.7 mL/kg) [P.O.:75]  Out: 400 (3.7 mL/kg) " [Urine:400 (0.1 mL/kg/hr)]  Weight: 106.7 kg     Relevant Results  Results for orders placed or performed during the hospital encounter of 10/28/24 (from the past 24 hours)   POCT GLUCOSE   Result Value Ref Range    POCT Glucose 153 (H) 74 - 99 mg/dL   POCT GLUCOSE   Result Value Ref Range    POCT Glucose 147 (H) 74 - 99 mg/dL   CBC and Auto Differential   Result Value Ref Range    WBC 7.2 4.4 - 11.3 x10*3/uL    nRBC 0.0 0.0 - 0.0 /100 WBCs    RBC 3.80 (L) 4.50 - 5.90 x10*6/uL    Hemoglobin 11.8 (L) 13.5 - 17.5 g/dL    Hematocrit 36.4 (L) 41.0 - 52.0 %    MCV 96 80 - 100 fL    MCH 31.1 26.0 - 34.0 pg    MCHC 32.4 32.0 - 36.0 g/dL    RDW 13.4 11.5 - 14.5 %    Platelets 204 150 - 450 x10*3/uL    Neutrophils % 69.1 40.0 - 80.0 %    Immature Granulocytes %, Automated 0.4 0.0 - 0.9 %    Lymphocytes % 19.3 13.0 - 44.0 %    Monocytes % 8.9 2.0 - 10.0 %    Eosinophils % 1.7 0.0 - 6.0 %    Basophils % 0.6 0.0 - 2.0 %    Neutrophils Absolute 4.97 1.60 - 5.50 x10*3/uL    Immature Granulocytes Absolute, Automated 0.03 0.00 - 0.50 x10*3/uL    Lymphocytes Absolute 1.39 0.80 - 3.00 x10*3/uL    Monocytes Absolute 0.64 0.05 - 0.80 x10*3/uL    Eosinophils Absolute 0.12 0.00 - 0.40 x10*3/uL    Basophils Absolute 0.04 0.00 - 0.10 x10*3/uL   Renal Function Panel   Result Value Ref Range    Glucose 227 (H) 74 - 99 mg/dL    Sodium 141 136 - 145 mmol/L    Potassium 3.9 3.5 - 5.3 mmol/L    Chloride 103 98 - 107 mmol/L    Bicarbonate 27 21 - 32 mmol/L    Anion Gap 15 10 - 20 mmol/L    Urea Nitrogen 18 6 - 23 mg/dL    Creatinine 1.01 0.50 - 1.30 mg/dL    eGFR 77 >60 mL/min/1.73m*2    Calcium 9.6 8.6 - 10.6 mg/dL    Phosphorus 2.8 2.5 - 4.9 mg/dL    Albumin 4.1 3.4 - 5.0 g/dL   Magnesium   Result Value Ref Range    Magnesium 1.95 1.60 - 2.40 mg/dL   Ammonia   Result Value Ref Range    Ammonia 35 16 - 53 umol/L   Ethanol   Result Value Ref Range    Alcohol <10 <=10 mg/dL   POCT GLUCOSE   Result Value Ref Range    POCT Glucose 303 (H) 74 - 99  mg/dL     CT brain attack angio head and neck W and WO IV contrast (10/27/2024)   IMPRESSION:  CTA neck:      No evidence for significant stenosis of the cervical vessels.      Mild atherosclerotic calcification at the carotid bifurcation  bilaterally without stenosis.      CTA head:      There is mild narrowing of the distal M1 segment on the left.      The proximal right intradural vertebral artery is not visualized.  There is reconstitution proximal to the vertebrobasilar junction.      Otherwise, no evidence for significant stenosis or large branch  vessel cutoffs of the intracranial vessels.    XR chest 1 view (10/27/2024)  IMPRESSION:  Mild pulmonary edema.    CT brain attack head wo IV contrast (10/27/2024)  IMPRESSION:  No acute intracranial pathology.          Assessment/Plan   Albret Abbott is a 74 y.o. male with a past medical history of T2DM c/b peripheral neuropathy, HLD, CVA, MARILYN, Depression, history of seizures, PTSD who presents with altered mental status starting on 10/27/2024. Initially in the [place] ED there was concern for catatonia, so the patient received lorazapem 4 mg although there was no documentation of specific catatonic findings or how they changed with administration of lorazepam. Patient on exam is oriented to only self and is overall limited in his communication. The patient had no catatonic behaviors on exam and there is a low concern for catatonia as the cause for this patients presentation. In discussion with psychiatry, it is most likely that the experienced hypoactive delirium complicated by underlying dementia and a known history of major depressive disorder. Metabolic and infectious work-up has been negative. Patient appeared to be back at baseline today as he was oriented and engaged in conversation. He also had an elevated alcohol level on admission. Upon recheck of the alcohol level, it was within normal range. Per his wife, alcohol is available within their home; however,  she did state that she did not think he drank alcohol. Patient denies drinking alcohol but possible consumption of alcohol may have contributed to his altered mental status on presentation.     #Altered Mental Status, resolved  :: TSH (2.77), B12 (413), SAIGE (positive), CPK (62), RPR (pending), HIV (normal)  :: Unremarkable metabolic panel except elevated glucose (380 at admission, 180 now  :: Alcohol level 44 (elevated) at admission and now <10  Plan:  -Consulted psychiatry:   - Continue Haldol 0.25mg PO Q8 PRN for agitation   - Continue folic acid 1 mg once daily  - Continue thiamine 500 mg IV TID for three days followed by thiamine 100 mg PO once daily      #T2DM  :: A1c 7.4 on 10/28/2024  Plan:  -Hold home Metformin, Glyburide, and Pioglitazone   -Continue ISS  -Hypoglycemia protocol     #HTN  -Hold home Lisinopril given soft BP  -CTM     #History of Tonic-Clonic Seizure in 2021  ::  Keppra level 10 micrograms/mL  :: EEG showed mild diffuse encephalopathy   Plan:  -Continue home Keppra 500mg BID    #MARILYN  #Depression  #PTSD  -Continue home Buspar 10mg and Zoloft 100mg BID  -Holding Risperidone 1mg daily     Diet: Regular  DVT prophylaxis: Lovenox  Bowel Regimen: Miralax and sennosides  GI Prophylaxis: PPI  Code status: DNR/DNI  NOK: Danuta (wife) - [phone]     Karel Acosta, MS4

## 2024-10-30 LAB
ALBUMIN SERPL BCP-MCNC: 4.1 G/DL (ref 3.4–5)
ANION GAP SERPL CALC-SCNC: 12 MMOL/L (ref 10–20)
APPEARANCE UR: CLEAR
BASOPHILS # BLD AUTO: 0.03 X10*3/UL (ref 0–0.1)
BASOPHILS NFR BLD AUTO: 0.4 %
BILIRUB UR STRIP.AUTO-MCNC: NEGATIVE MG/DL
BUN SERPL-MCNC: 18 MG/DL (ref 6–23)
CALCIUM SERPL-MCNC: 9.6 MG/DL (ref 8.6–10.6)
CHLORIDE SERPL-SCNC: 105 MMOL/L (ref 98–107)
CO2 SERPL-SCNC: 27 MMOL/L (ref 21–32)
COLOR UR: ABNORMAL
CREAT SERPL-MCNC: 0.98 MG/DL (ref 0.5–1.3)
EGFRCR SERPLBLD CKD-EPI 2021: 80 ML/MIN/1.73M*2
EOSINOPHIL # BLD AUTO: 0.09 X10*3/UL (ref 0–0.4)
EOSINOPHIL NFR BLD AUTO: 1.2 %
ERYTHROCYTE [DISTWIDTH] IN BLOOD BY AUTOMATED COUNT: 13 % (ref 11.5–14.5)
GLUCOSE BLD MANUAL STRIP-MCNC: 196 MG/DL (ref 74–99)
GLUCOSE BLD MANUAL STRIP-MCNC: 236 MG/DL (ref 74–99)
GLUCOSE BLD MANUAL STRIP-MCNC: 239 MG/DL (ref 74–99)
GLUCOSE BLD MANUAL STRIP-MCNC: 354 MG/DL (ref 74–99)
GLUCOSE SERPL-MCNC: 237 MG/DL (ref 74–99)
GLUCOSE UR STRIP.AUTO-MCNC: ABNORMAL MG/DL
HCT VFR BLD AUTO: 38.1 % (ref 41–52)
HGB BLD-MCNC: 12.4 G/DL (ref 13.5–17.5)
HOLD SPECIMEN: NORMAL
IMM GRANULOCYTES # BLD AUTO: 0.06 X10*3/UL (ref 0–0.5)
IMM GRANULOCYTES NFR BLD AUTO: 0.8 % (ref 0–0.9)
KETONES UR STRIP.AUTO-MCNC: NEGATIVE MG/DL
LEUKOCYTE ESTERASE UR QL STRIP.AUTO: NEGATIVE
LYMPHOCYTES # BLD AUTO: 1.67 X10*3/UL (ref 0.8–3)
LYMPHOCYTES NFR BLD AUTO: 21.5 %
MAGNESIUM SERPL-MCNC: 1.91 MG/DL (ref 1.6–2.4)
MCH RBC QN AUTO: 31.1 PG (ref 26–34)
MCHC RBC AUTO-ENTMCNC: 32.5 G/DL (ref 32–36)
MCV RBC AUTO: 96 FL (ref 80–100)
MONOCYTES # BLD AUTO: 0.64 X10*3/UL (ref 0.05–0.8)
MONOCYTES NFR BLD AUTO: 8.2 %
NEUTROPHILS # BLD AUTO: 5.29 X10*3/UL (ref 1.6–5.5)
NEUTROPHILS NFR BLD AUTO: 67.9 %
NITRITE UR QL STRIP.AUTO: NEGATIVE
NRBC BLD-RTO: 0 /100 WBCS (ref 0–0)
PH UR STRIP.AUTO: 6.5 [PH]
PHOSPHATE SERPL-MCNC: 3.1 MG/DL (ref 2.5–4.9)
PLATELET # BLD AUTO: 217 X10*3/UL (ref 150–450)
POTASSIUM SERPL-SCNC: 4 MMOL/L (ref 3.5–5.3)
PROT UR STRIP.AUTO-MCNC: NEGATIVE MG/DL
RBC # BLD AUTO: 3.99 X10*6/UL (ref 4.5–5.9)
RBC # UR STRIP.AUTO: NEGATIVE /UL
SODIUM SERPL-SCNC: 140 MMOL/L (ref 136–145)
SP GR UR STRIP.AUTO: 1.01
UROBILINOGEN UR STRIP.AUTO-MCNC: NORMAL MG/DL
WBC # BLD AUTO: 7.8 X10*3/UL (ref 4.4–11.3)

## 2024-10-30 PROCEDURE — 2500000001 HC RX 250 WO HCPCS SELF ADMINISTERED DRUGS (ALT 637 FOR MEDICARE OP)

## 2024-10-30 PROCEDURE — 97530 THERAPEUTIC ACTIVITIES: CPT | Mod: GP | Performed by: PHYSICAL THERAPIST

## 2024-10-30 PROCEDURE — 99233 SBSQ HOSP IP/OBS HIGH 50: CPT | Performed by: PSYCHIATRY & NEUROLOGY

## 2024-10-30 PROCEDURE — 2500000005 HC RX 250 GENERAL PHARMACY W/O HCPCS

## 2024-10-30 PROCEDURE — 85025 COMPLETE CBC W/AUTO DIFF WBC: CPT

## 2024-10-30 PROCEDURE — 83735 ASSAY OF MAGNESIUM: CPT

## 2024-10-30 PROCEDURE — 36415 COLL VENOUS BLD VENIPUNCTURE: CPT

## 2024-10-30 PROCEDURE — 82947 ASSAY GLUCOSE BLOOD QUANT: CPT

## 2024-10-30 PROCEDURE — 2500000002 HC RX 250 W HCPCS SELF ADMINISTERED DRUGS (ALT 637 FOR MEDICARE OP, ALT 636 FOR OP/ED)

## 2024-10-30 PROCEDURE — 1100000001 HC PRIVATE ROOM DAILY

## 2024-10-30 PROCEDURE — 99233 SBSQ HOSP IP/OBS HIGH 50: CPT | Performed by: STUDENT IN AN ORGANIZED HEALTH CARE EDUCATION/TRAINING PROGRAM

## 2024-10-30 PROCEDURE — 80069 RENAL FUNCTION PANEL: CPT

## 2024-10-30 PROCEDURE — 2500000004 HC RX 250 GENERAL PHARMACY W/ HCPCS (ALT 636 FOR OP/ED)

## 2024-10-30 PROCEDURE — 81003 URINALYSIS AUTO W/O SCOPE: CPT

## 2024-10-30 RX ORDER — LANOLIN ALCOHOL/MO/W.PET/CERES
100 CREAM (GRAM) TOPICAL DAILY
Start: 2024-10-31

## 2024-10-30 RX ORDER — GLIPIZIDE 10 MG/1
10 TABLET ORAL
Status: DISCONTINUED | OUTPATIENT
Start: 2024-10-31 | End: 2024-10-31 | Stop reason: HOSPADM

## 2024-10-30 RX ORDER — CARBOXYMETHYLCELLULOSE SODIUM 10 MG/ML
1 GEL OPHTHALMIC NIGHTLY
Status: DISCONTINUED | OUTPATIENT
Start: 2024-10-30 | End: 2024-10-30 | Stop reason: CLARIF

## 2024-10-30 RX ORDER — POLYETHYLENE GLYCOL 3350 17 G/17G
17 POWDER, FOR SOLUTION ORAL DAILY PRN
Start: 2024-10-30

## 2024-10-30 RX ORDER — TAMSULOSIN HYDROCHLORIDE 0.4 MG/1
0.4 CAPSULE ORAL NIGHTLY
Start: 2024-10-30

## 2024-10-30 RX ORDER — TAMSULOSIN HYDROCHLORIDE 0.4 MG/1
0.4 CAPSULE ORAL NIGHTLY
Status: DISCONTINUED | OUTPATIENT
Start: 2024-10-30 | End: 2024-10-31 | Stop reason: HOSPADM

## 2024-10-30 RX ORDER — PANTOPRAZOLE SODIUM 40 MG/1
40 TABLET, DELAYED RELEASE ORAL DAILY
Start: 2024-10-30

## 2024-10-30 RX ORDER — SENNOSIDES 8.6 MG/1
2 TABLET ORAL NIGHTLY PRN
Start: 2024-10-30

## 2024-10-30 RX ORDER — POLYETHYLENE GLYCOL 3350 17 G/17G
17 POWDER, FOR SOLUTION ORAL DAILY PRN
Status: DISCONTINUED | OUTPATIENT
Start: 2024-10-30 | End: 2024-10-31 | Stop reason: HOSPADM

## 2024-10-30 RX ORDER — FOLIC ACID 1 MG/1
1 TABLET ORAL DAILY
Start: 2024-10-31

## 2024-10-30 RX ORDER — SENNOSIDES 8.6 MG/1
2 TABLET ORAL NIGHTLY PRN
Status: DISCONTINUED | OUTPATIENT
Start: 2024-10-30 | End: 2024-10-31 | Stop reason: HOSPADM

## 2024-10-30 RX ORDER — SERTRALINE HYDROCHLORIDE 100 MG/1
100 TABLET, FILM COATED ORAL 2 TIMES DAILY
Start: 2024-10-30

## 2024-10-30 ASSESSMENT — PAIN SCALES - GENERAL
PAINLEVEL_OUTOF10: 0 - NO PAIN
PAINLEVEL_OUTOF10: 0 - NO PAIN

## 2024-10-30 ASSESSMENT — COGNITIVE AND FUNCTIONAL STATUS - GENERAL
TURNING FROM BACK TO SIDE WHILE IN FLAT BAD: A LOT
STANDING UP FROM CHAIR USING ARMS: TOTAL
MOBILITY SCORE: 9
MOVING TO AND FROM BED TO CHAIR: TOTAL
WALKING IN HOSPITAL ROOM: TOTAL
CLIMB 3 TO 5 STEPS WITH RAILING: TOTAL
MOVING FROM LYING ON BACK TO SITTING ON SIDE OF FLAT BED WITH BEDRAILS: A LITTLE

## 2024-10-30 ASSESSMENT — PAIN SCALES - WONG BAKER: WONGBAKER_NUMERICALRESPONSE: NO HURT

## 2024-10-30 ASSESSMENT — PAIN - FUNCTIONAL ASSESSMENT: PAIN_FUNCTIONAL_ASSESSMENT: 0-10

## 2024-10-30 NOTE — PROGRESS NOTES
"Physical Therapy    Physical Therapy Treatment    Patient Name: Albert Abbott \"Taras\"  MRN: [id number]  Department: [facility] 40  Room: [address] [place] [address]  Today's Date: 10/30/2024  Time Calculation  Start Time: 1102  Stop Time: 1126  Time Calculation (min): 24 min         Assessment/Plan   PT Assessment  PT Assessment Results: Decreased strength, Decreased endurance, Impaired balance, Decreased mobility, Pain  Rehab Prognosis: Good  Barriers to Discharge: None  End of Session Communication: Bedside nurse  End of Session Patient Position: Bed, 3 rail up, Alarm off, not on at start of session  PT Plan  Inpatient/Swing Bed or Outpatient: Inpatient  PT Plan  Treatment/Interventions: Bed mobility, Transfer training, Gait training, Balance training, Strengthening, Endurance training, Therapeutic exercise, Therapeutic activity, Home exercise program  PT Plan: Ongoing PT  PT Frequency: 3 times per week  PT Discharge Recommendations: Moderate intensity level of continued care  PT Recommended Transfer Status: Assist x2  PT - OK to Discharge: Yes      General Visit Information:   PT  Visit  PT Received On: 10/30/24  General  Reason for Referral: presents with altered mental status.  Past Medical History Relevant to Rehab: T2DM c/b neuropathy, HLD, CVA, MARILYN, Depression, PTSD  Missed Visit: Yes  Missed Visit Reason: Patient refused (Pt supine in bed upon arrival refused to participate. Encouagement provided to sit EOB, pt continues to decline.)  Prior to Session Communication: Bedside nurse  Patient Position Received: Bed, 3 rail up, Alarm off, not on at start of session  Preferred Learning Style: verbal  General Comment: Pt supine in bed upon arroval, plesant and willing to partiicpate in PT session. Alert and oriented this date. Pt provided info on PLOF: lives with wife, no VALDEMAR, stair gilde to basement, uses rollator, L handed.    Subjective   Precautions:  Precautions  Medical Precautions: Fall precautions    Vital Signs (Past 2hrs)  "       Date/Time Vitals Session Patient Position Pulse Resp SpO2 BP MAP (mmHg)    10/30/24 1102 During PT  Sitting  65  --  97 %  118/72  --     10/30/24 11:57:12 --  --  52  18  98 %  127/51  76                         Objective   Pain:  Pain Assessment  Pain Assessment: 0-10  0-10 (Numeric) Pain Score: 0 - No pain  Cognition:  Cognition  Overall Cognitive Status: Within Functional Limits  Arousal/Alertness: Appropriate responses to stimuli  Orientation Level: Oriented X4       Postural Control:  Static Sitting Balance  Static Sitting-Level of Assistance:  (CGA initially to ModA with fatigue)  Dynamic Sitting Balance  Dynamic Sitting-Level of Assistance:  (Nuria initially to ModA with fatigue)       Treatments:  Therapeutic Exercise  Therapeutic Exercise Performed: Yes  Therapeutic Exercise Activity 1: Seated BLE:AP, LAQ, Hip F 1x10 reps. Cues for techniques. Rest breaks as needed    Therapeutic Activity  Therapeutic Activity Performed: Yes  Therapeutic Activity 1: Pt tolerated seated EOB ~ 14 mins with assist level varied CGA-ModA due to retro and left lateral lean with fatigue.Cues and assist to maintain midline. Pt educated on PT POC and  importance of OOB. Intially agreeable for transfers, but politely declined later due to fatigue.         Bed Mobility  Bed Mobility: Yes  Bed Mobility 1  Bed Mobility 1: Rolling right, Rolling left  Level of Assistance 1: Contact guard, Minimal verbal cues  Bed Mobility 2  Bed Mobility  2: Supine to sitting  Level of Assistance 2: Moderate assistance  Bed Mobility Comments 2: HOB elevated, bed rail  Bed Mobility 3  Bed Mobility 3: Sitting to supine  Level of Assistance 3: Moderate assistance  Bed Mobility 4  Bed Mobility 4: Scooting (to HOB)  Level of Assistance 4: Dependent, +2       Transfers  Transfer: No         Outcome Measures:  [facility] Basic Mobility  Turning from your back to your side while in a flat bed without using bedrails: A little  Moving from lying on your back to  sitting on the side of a flat bed without using bedrails: A lot  Moving to and from bed to chair (including a wheelchair): Total  Standing up from a chair using your arms (e.g. wheelchair or bedside chair): Total  To walk in hospital room: Total  Climbing 3-5 steps with railing: Total  Basic Mobility - Total Score: 9    Education Documentation  Precautions, taught by Ning Palm PT at 10/30/2024 11:55 AM.  Learner: Patient  Readiness: Acceptance  Method: Explanation, Demonstration  Response: Verbalizes Understanding, Needs Reinforcement    Mobility Training, taught by Ning Palm PT at 10/30/2024 11:55 AM.  Learner: Patient  Readiness: Acceptance  Method: Explanation, Demonstration  Response: Verbalizes Understanding, Needs Reinforcement    Education Comments  No comments found.        OP EDUCATION:       Encounter Problems       Encounter Problems (Active)       Balance       Pt will tolerate seated EOB ~10 mins with SBA (Progressing)       Start:  10/28/24    Expected End:  11/11/24               Mobility       Pt will be Nuria for ambulation 20 ft with LRAD (Progressing)       Start:  10/28/24    Expected End:  11/11/24               PT Transfers       Pt will be Nuria for sit to stand and bed to chair transfers with LRAD (Progressing)       Start:  10/28/24    Expected End:  11/11/24            Pt will be Nuria for bed mobility (Progressing)       Start:  10/28/24    Expected End:  11/11/24               Pain - Adult

## 2024-10-30 NOTE — PROGRESS NOTES
Transitional Care Coordination Progress Note:  Pt is medically ready for discharge.  This RN spoke with pt's wife Cristin ([phone]) to review the SNF acceptances (6 out of 7 accepted).  Wife states she was doing research and found the [facility] and would like a referral to this facility ([facility]).  Referral sent via Careport.  Plan to follow up with pt's wife this afternoon.    Addendum 1602:  Pt was accepted at [facility] ([facility]).  SW completed the 7000 form in [facility].  Transport was confirmed for 10/31 at 1200pm (please see SW note).  Left a message for pt's wife detailing the discharge plans but she was already aware pt would discharge tomorrow, confirmed [facility] accepted and time of discharge.     Josie Becerra MSN, RN-BC  Transitional Care Coordinator (TCC)  [phone]

## 2024-10-30 NOTE — PROGRESS NOTES
Albert Abbott is a 74 y.o. y.o. male on day 2 of admission presenting with AMS (altered mental status) [R41.82].     Subjective      10/30/24 1544   Discharge Planning   Home or Post Acute Services Post acute facilities (Rehab/SNF/etc)   Type of Post Acute Facility Services Skilled nursing  ([facility]- N2N: [phone])   Expected Discharge Disposition SNF   Does the patient need discharge transport arranged? Yes   RoundTrip coordination needed? Yes   Has discharge transport been arranged? Yes   What day is the transport expected? 10/31/24   What time is the transport expected? 1200  (via [facility])     Patient has been accepted to [facility] & able to admit tomorrow once he achieves 3 midnights. Confirmed with medical team that patient is medically ready for discharge. Transportation requested in Roundtrip and received confirmed  time of 12:00pm with [facility]. 7000 completed in [facility]. Primary Team & Bedside RN updated of discharge.     Attempted to update patients wife Danuta but had to LM requesting return call.    Will send completed [place]/AVS in the morning to facility.    - Kayli DE LOS SANTOS, MA, [facility]  Care Transitions   [facility] Secure Chat or [phone]

## 2024-10-30 NOTE — CARE PLAN
The patient's goals for the shift include      The clinical goals for the shift include Pt will remain free from falls and injuries    Over the shift, pt remained safe. No complaints of pain or  no signs of distress. Pt had an uneventful shift. Safety measures implemented, call light within reach and bed at lowest position

## 2024-10-30 NOTE — PROGRESS NOTES
Recreation Therapy Note    Therapy Session  Visit Type: New visit  Session Start Time: 1230  Session End Time: 1315  Intervention Delivery: In-person  Conflict of Service: None  Number of family members present: 0  Family Present for Session: None  Family Participation: None  Number of staff members present: 1    Pre-assessment  Mood/Affect: Appropriate, Calm  Verbalized Emotional State:  (none verbalized)    Treatment  Areas of Focus: Coping, Self-expression, Normalization, Stress reduction  Co-Treatment:  (none)  Interruption: No  Patient Fell Asleep at End of Session: No    Post-assessment  Mood/Affect: Appropriate, Calm, Cooperative, Participative  Verbalized Emotional State:  (none noted)  Continue Visiting: Yes  Total Session Time (min): 45 minutes    Narrative  Assessment Detail: Upon arrival patient was resting in bed and was agreeable to engaging in a recreational therapy session.  Plan: To encourage the exploration of safe and effective positive leisure coping skills to manage situational stressors.  Intervention: Patient chose to play cards.  Evaluation: Patient was pleasant and social throughout the session.  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  Had some word finding difficulty at times but able to carry on conversation appropriately overall.  Follow-up: Will continue to encourage the exploration of  positive leisure coping skills exploration.  Patient Comments: See above.    Albert Abbott is a 74 y.o. male with a past medical history of T2DM c/b neuropathy, HLD, CVA, MARILYN, Depression, PTSD who presents with altered mental status.  Patient was brought in by EMS.  Wife called EMS as patient was altered.  Per patients wife on the phone he was at his baseline until Suday afternoon. She states Saturday he went to the store, Sunday he got up and made coffee, but then that afternoon when she was trying to shower she reports he came into the bathroom and was  not acting like himself. He did not recognize her, was not speaking sensically, and was unable to answer her questions or be redirected. His wife called EMS and they reported he was unable to answer any questions at the time. Per his wife at baseline he sits and watches TV aand does not do much besides sleep and TV however he is independent in eating. His wife reports for the last 2 years he has seamed more forgetful and she states [facility] provider told him he has early onset dementia. His wife reports he has not kept up with his personal hygiene for the last 2 years and has not showered in over 1-2 months.

## 2024-10-30 NOTE — CARE PLAN
The patient's goals for the shift include  working with PT again    The clinical goals for the shift include Pt will remain free from falls and injuries    Throughout the shift Pt was calm and cooperative with care. Pt very pleasant and spoke with wife shaniqua today. Bed locked and low, call light within reach, video monitoring ongoing with  at monitor.     Problem: Pain - Adult  Goal: Verbalizes/displays adequate comfort level or baseline comfort level  Outcome: Progressing     Problem: Safety - Adult  Goal: Free from fall injury  Outcome: Progressing     Problem: Discharge Planning  Goal: Discharge to home or other facility with appropriate resources  Outcome: Progressing     Problem: Chronic Conditions and Co-morbidities  Goal: Patient's chronic conditions and co-morbidity symptoms are monitored and maintained or improved  Outcome: Progressing     Problem: Diabetes  Goal: Achieve decreasing blood glucose levels by end of shift  Outcome: Progressing  Goal: Increase stability of blood glucose readings by end of shift  Outcome: Progressing     Problem: Skin  Goal: Decreased wound size/increased tissue granulation at next dressing change  Outcome: Progressing  Flowsheets (Taken 10/30/2024 0900)  Decreased wound size/increased tissue granulation at next dressing change: Promote sleep for wound healing  Goal: Participates in plan/prevention/treatment measures  Outcome: Progressing  Flowsheets (Taken 10/30/2024 0900)  Participates in plan/prevention/treatment measures: Elevate heels  Goal: Prevent/manage excess moisture  Outcome: Progressing  Flowsheets (Taken 10/30/2024 0900)  Prevent/manage excess moisture: Cleanse incontinence/protect with barrier cream  Goal: Prevent/minimize sheer/friction injuries  Outcome: Progressing  Flowsheets (Taken 10/30/2024 0900)  Prevent/minimize sheer/friction injuries:   Use pull sheet   Increase activity/out of bed for meals  Goal: Promote/optimize nutrition  Outcome:  Progressing  Flowsheets (Taken 10/30/2024 0900)  Promote/optimize nutrition: Consume > 50% meals/supplements  Goal: Promote skin healing  Outcome: Progressing     Problem: Fall/Injury  Goal: Not fall by end of shift  Outcome: Progressing  Goal: Be free from injury by end of the shift  Outcome: Progressing

## 2024-10-30 NOTE — PROGRESS NOTES
"Albert Abbott \"Taras\" is a 74 y.o. male on hospital day 2 of admission presenting with AMS (altered mental status).    Subjective   Patient seen this morning bedside with medical student present.  Patient said he was feeling okay when asked.  He stated that he wished he could get up and walk because he is a little anxious and he would like to go home and get on living with his life.  When asked about his mood he states that so-so anxious because he just wants to leave.  When asked regarding sleep he states he slept but only a couple of times because people kept coming in and out of his room.  When asked regarding depression the patient states not that I can think of when asked regarding anxiety he states that is mostly the same as it has been at home.  When asked regarding going to the bathroom he does state he has been painting a little strangely, he does not remember if this is something that is usual for him but he feels like it is strange.  The writer told him it is common for men his age to have prostate issues the patient seemingly did not remember if he had these.  When asked regarding eating and drinking the patient states he is hungry and ordered a lot for breakfast and he is looking forward to it.  When asked he denies SI or HI AVH.    Additional Information:   Over the shift, pt remained safe. No complaints of pain or  no signs of distress. Pt had an uneventful shift. Safety measures implemented, call light within reach and bed at lowest position     Objective   No rigidity present in upper limbs very flexible.     Vital Signs:      10/28/2024     3:43 PM 10/28/2024     7:00 PM 10/28/2024    11:25 PM 10/29/2024     8:03 AM 10/29/2024     4:02 PM 10/29/2024    11:56 PM 10/30/2024     8:09 AM   Vitals   Systolic 143 146 117 131 123 108 140   Diastolic 73 71 52 55 60 58 61   Heart Rate  71 58 55 54 51 53   Temp 36.2 °C (97.2 °F) 36.4 °C (97.5 °F) 36.6 °C (97.9 °F)  36.4 °C (97.5 °F) 36.6 °C (97.9 °F) 36.5 " "°C (97.7 °F)   Resp  18 17 18   18      Intake/Output last 3 Shifts:  I/O last 3 completed shifts:  In: - (0 mL/kg)   Out: 1050 (9.8 mL/kg) [Urine:1050 (0.3 mL/kg/hr)]  Weight: 106.7 kg     Mental Status Exam:  General/Appearance: Mild Psychological Distress, appears stated age, in hospital gown, laying in bed, body habitus: obese, grooming/hygiene is reasonable for setting, bald, IV in place  Attitude/Behavior: engages in interview, cooperative, appropriate eye contact, calm  Motor Activity: no psychomotor agitation/retardation, no tics/tremors, no EPS/TD, gait: not assessed  Mood: \"so so\"  Affect: Quality-mood congruent, Intensity-blunted, Range-full  Speech: normal rate/tone/volume/prosody/syntax  Thought Process: linear, circumstantial, organized  Thought Content: denies SI/HI, Delusional thinking: none elicited  Thought Perception: denies AVH  Cognition: alert & oriented x 4, no deficits in attention/concentration noted, good fund of knowledge, recent and remote memory intact  Insight: fair  Judgment: fair    Current Medications  Scheduled   aspirin, 81 mg, oral, Daily  busPIRone, 10 mg, oral, BID  cholecalciferol, 4,000 Units, oral, Daily  enoxaparin, 40 mg, subcutaneous, q24h  folic acid, 1 mg, oral, Daily  insulin lispro, 0-10 Units, subcutaneous, TID  levETIRAcetam, 500 mg, oral, BID  [Held by provider] lisinopril, 5 mg, oral, Daily  pantoprazole, 40 mg, oral, Daily  polyethylene glycol, 17 g, oral, Daily  [Held by provider] risperiDONE, 1 mg, oral, Nightly  rosuvastatin, 20 mg, oral, Daily  sennosides, 2 tablet, oral, BID  sertraline, 100 mg, oral, BID  [START ON 10/31/2024] thiamine, 100 mg, oral, Daily  thiamine, 500 mg, intravenous, TID      Continuous      PRN   PRN medications: albuterol, haloperidol     Labs  Results for orders placed or performed during the hospital encounter of 10/28/24 (from the past 24 hours)   CBC and Auto Differential   Result Value Ref Range    WBC 7.2 4.4 - 11.3 x10*3/uL    " nRBC 0.0 0.0 - 0.0 /100 WBCs    RBC 3.80 (L) 4.50 - 5.90 x10*6/uL    Hemoglobin 11.8 (L) 13.5 - 17.5 g/dL    Hematocrit 36.4 (L) 41.0 - 52.0 %    MCV 96 80 - 100 fL    MCH 31.1 26.0 - 34.0 pg    MCHC 32.4 32.0 - 36.0 g/dL    RDW 13.4 11.5 - 14.5 %    Platelets 204 150 - 450 x10*3/uL    Neutrophils % 69.1 40.0 - 80.0 %    Immature Granulocytes %, Automated 0.4 0.0 - 0.9 %    Lymphocytes % 19.3 13.0 - 44.0 %    Monocytes % 8.9 2.0 - 10.0 %    Eosinophils % 1.7 0.0 - 6.0 %    Basophils % 0.6 0.0 - 2.0 %    Neutrophils Absolute 4.97 1.60 - 5.50 x10*3/uL    Immature Granulocytes Absolute, Automated 0.03 0.00 - 0.50 x10*3/uL    Lymphocytes Absolute 1.39 0.80 - 3.00 x10*3/uL    Monocytes Absolute 0.64 0.05 - 0.80 x10*3/uL    Eosinophils Absolute 0.12 0.00 - 0.40 x10*3/uL    Basophils Absolute 0.04 0.00 - 0.10 x10*3/uL   Renal Function Panel   Result Value Ref Range    Glucose 227 (H) 74 - 99 mg/dL    Sodium 141 136 - 145 mmol/L    Potassium 3.9 3.5 - 5.3 mmol/L    Chloride 103 98 - 107 mmol/L    Bicarbonate 27 21 - 32 mmol/L    Anion Gap 15 10 - 20 mmol/L    Urea Nitrogen 18 6 - 23 mg/dL    Creatinine 1.01 0.50 - 1.30 mg/dL    eGFR 77 >60 mL/min/1.73m*2    Calcium 9.6 8.6 - 10.6 mg/dL    Phosphorus 2.8 2.5 - 4.9 mg/dL    Albumin 4.1 3.4 - 5.0 g/dL   Magnesium   Result Value Ref Range    Magnesium 1.95 1.60 - 2.40 mg/dL   Ammonia   Result Value Ref Range    Ammonia 35 16 - 53 umol/L   Ethanol   Result Value Ref Range    Alcohol <10 <=10 mg/dL   POCT GLUCOSE   Result Value Ref Range    POCT Glucose 303 (H) 74 - 99 mg/dL   POCT GLUCOSE   Result Value Ref Range    POCT Glucose 230 (H) 74 - 99 mg/dL        Imaging  EEG    Result Date: 10/29/2024  IMPRESSION Impression This routine awake EEG is indicative of a mild diffuse encephalopathy. The patient was drowsy during most of the recording. No epileptiform activity or lateralizing signs seen. A full report will be scanned into the patient's chart at a later time. This report has  "been interpreted and electronically signed by      Psychiatric Risk Assessment  Acute Risk of Harm to Others is Considered: Low  Acute Risk of Harm to Self is Considered: Low    Assessment:  Albert Abbott \"Taras\" is a 74 y.o. male with a past psychiatric history of PTSD and dementia and a past medical history of T2DM. EPAT was consulted for altered mental status when his wife called 911 because he was unable to recognize her. In the ED, he received lorazapem 2 mg for concern that he was catatonic, although there was no documentation of specific catatonic findings or how they changed with administration of lorazepam. Per conversation with his nurse, however, pt became more aggressive after he received lorazepam, and was then given an additional 2 mg IV (for a total of 4 mg lorazepam during ED visit), which made him calmer. Per conversation with pt's physician, however, pt's catalepsy reportedly improved after the first administration of lorazepam, so reports of catatonic sx after lorazepam are conflicting.    10/30: Patient seen this morning.  Overall significantly improved compared to previous days, no longer really delirious.  He continues to be aware of person place time and situation.  He did acknowledge today that it is possible that he may have drank alcohol which is a significant change from previous days.  Overall he seems back to baseline.   No medication changes at this time    Impression:  #Delirium - hypoactive type  # major neurocognitive disorder   #PTSD, per chart  #MDD, per chart     Recommendations:  Safety/Monitoring:  Patient does not meet criteria for inpatient psychiatric admission  Patient does not require 1:1 observation from a psychiatric perspective, defer to primary team  Daily interdisciplinary safety and planning huddle with Psychiatry  Video monitoring as with all patients on the MPU  Psychiatry will follow patient daily while on the MPU    Medications:  - Cont Sertraline 100mg PO " BID  - Cont Buspar 10mg BID  - Cont Haldol 0.25mg PO Q8 PRN for agitation    Considerations:  Therapies recommended:  will continue to assess for benefit and meaningful participation    Delirium Guidelines  Provide glasses, hearing aids and/or communication boards as needed for impairments  Frequent reorientation, minimize room and staff changes  Open blinds during the day, dark/quiet room at night   Minimal interruptions and daytime naps  Early evaluation and intervention by PT, out of bed as tolerated  Minimize use of restraints   Minimize use of benzodiazepines, anticholinergic medications, and opiates (while ensuring adequate treatment of pain)  Keep Mg>2, K>4 (as able)  Ensure regular bowel and bladder function (as able)    Disposition/Discharge Planning:  SW following, appreciate assistance      Multidisciplinary Rounding  Consulting Physician, Fellow, Medical Student, Charge Nurse, Nurse, Pharmacist, Social Work, Care Coordinator, Dietician, Music Therapy, Art Therapy, and Recreational Therapy    Medication Consent  N/A - Consult Service    John Barber MD   PGY-5 Consult Liaison Psychiatry Fellow.

## 2024-10-30 NOTE — PROGRESS NOTES
"Albert Abbott \"Kamlesh" is a 74 y.o. male on day 2 of admission presenting with AMS (altered mental status).    Subjective   No acute overnight events.     Patient is alert and orient to name, location, and year. He also understands his current situation and that he is waiting for SNF placement. He noted some pain with urination yesterday and today. He states that he has continued to be voiding (850 mL in last 24 hours).    He denies shortness of breath, abdominal pain, chest pain, headache, or other symptoms. He has been eating and had one bowel movement yesterday.       Objective     Physical Exam  Constitutional:       General: He is not in acute distress.     Appearance: He is not ill-appearing.   HENT:      Head: Normocephalic and atraumatic.      Mouth/Throat:      Mouth: Mucous membranes are moist.   Cardiovascular:      Rate and Rhythm: Normal rate and regular rhythm.      Heart sounds: No murmur heard.     No gallop.   Pulmonary:      Effort: Pulmonary effort is normal. No respiratory distress.      Breath sounds: No stridor. No wheezing or rales.   Abdominal:      General: There is no distension.      Palpations: Abdomen is soft.      Tenderness: There is no abdominal tenderness.   Musculoskeletal:      Right lower leg: No edema.      Left lower leg: No edema.   Skin:     General: Skin is warm.      Capillary Refill: Capillary refill takes less than 2 seconds.   Neurological:      General: No focal deficit present.      Comments: Oriented to self, location, time, and circumstance.         Last Recorded Vitals  Blood pressure 108/58, pulse 51, temperature 36.6 °C (97.9 °F), resp. rate 18, height 1.803 m (5' 11\"), weight 107 kg (235 lb 3.2 oz), SpO2 96%.  Intake/Output last 3 Shifts:  I/O last 3 completed shifts:  In: - (0 mL/kg)   Out: 1050 (9.8 mL/kg) [Urine:1050 (0.3 mL/kg/hr)]  Weight: 106.7 kg     Relevant Results  Results for orders placed or performed during the hospital encounter of 10/28/24 (from " the past 24 hours)   CBC and Auto Differential   Result Value Ref Range    WBC 7.2 4.4 - 11.3 x10*3/uL    nRBC 0.0 0.0 - 0.0 /100 WBCs    RBC 3.80 (L) 4.50 - 5.90 x10*6/uL    Hemoglobin 11.8 (L) 13.5 - 17.5 g/dL    Hematocrit 36.4 (L) 41.0 - 52.0 %    MCV 96 80 - 100 fL    MCH 31.1 26.0 - 34.0 pg    MCHC 32.4 32.0 - 36.0 g/dL    RDW 13.4 11.5 - 14.5 %    Platelets 204 150 - 450 x10*3/uL    Neutrophils % 69.1 40.0 - 80.0 %    Immature Granulocytes %, Automated 0.4 0.0 - 0.9 %    Lymphocytes % 19.3 13.0 - 44.0 %    Monocytes % 8.9 2.0 - 10.0 %    Eosinophils % 1.7 0.0 - 6.0 %    Basophils % 0.6 0.0 - 2.0 %    Neutrophils Absolute 4.97 1.60 - 5.50 x10*3/uL    Immature Granulocytes Absolute, Automated 0.03 0.00 - 0.50 x10*3/uL    Lymphocytes Absolute 1.39 0.80 - 3.00 x10*3/uL    Monocytes Absolute 0.64 0.05 - 0.80 x10*3/uL    Eosinophils Absolute 0.12 0.00 - 0.40 x10*3/uL    Basophils Absolute 0.04 0.00 - 0.10 x10*3/uL   Renal Function Panel   Result Value Ref Range    Glucose 227 (H) 74 - 99 mg/dL    Sodium 141 136 - 145 mmol/L    Potassium 3.9 3.5 - 5.3 mmol/L    Chloride 103 98 - 107 mmol/L    Bicarbonate 27 21 - 32 mmol/L    Anion Gap 15 10 - 20 mmol/L    Urea Nitrogen 18 6 - 23 mg/dL    Creatinine 1.01 0.50 - 1.30 mg/dL    eGFR 77 >60 mL/min/1.73m*2    Calcium 9.6 8.6 - 10.6 mg/dL    Phosphorus 2.8 2.5 - 4.9 mg/dL    Albumin 4.1 3.4 - 5.0 g/dL   Magnesium   Result Value Ref Range    Magnesium 1.95 1.60 - 2.40 mg/dL   Ammonia   Result Value Ref Range    Ammonia 35 16 - 53 umol/L   Ethanol   Result Value Ref Range    Alcohol <10 <=10 mg/dL   POCT GLUCOSE   Result Value Ref Range    POCT Glucose 303 (H) 74 - 99 mg/dL   POCT GLUCOSE   Result Value Ref Range    POCT Glucose 230 (H) 74 - 99 mg/dL     CT brain attack angio head and neck W and WO IV contrast (10/27/2024)   IMPRESSION:  CTA neck:      No evidence for significant stenosis of the cervical vessels.      Mild atherosclerotic calcification at the carotid  bifurcation  bilaterally without stenosis.      CTA head:      There is mild narrowing of the distal M1 segment on the left.      The proximal right intradural vertebral artery is not visualized.  There is reconstitution proximal to the vertebrobasilar junction.      Otherwise, no evidence for significant stenosis or large branch  vessel cutoffs of the intracranial vessels.    XR chest 1 view (10/27/2024)  IMPRESSION:  Mild pulmonary edema.    CT brain attack head wo IV contrast (10/27/2024)  IMPRESSION:  No acute intracranial pathology.          Assessment/Plan   Albert Abbott is a 74 y.o. male with a past medical history of T2DM c/b peripheral neuropathy, HLD, CVA, MARILYN, Depression, history of seizures, PTSD who presents with altered mental status starting on 10/27/2024. Initially in the [facility] ED there was concern for catatonia, so the patient received lorazapem 4 mg although there was no documentation of specific catatonic findings or how they changed with administration of lorazepam. Patient on exam is oriented to only self and is overall limited in his communication. The patient had no catatonic behaviors on exam and there is a low concern for catatonia as the cause for this patients presentation. In discussion with psychiatry, it is most likely that the experienced hypoactive delirium complicated by underlying dementia and a known history of major depressive disorder. Metabolic and infectious work-up has been negative. Patient appeared to be back at baseline today as he was oriented and engaged in conversation. He also had an elevated alcohol level on admission. Upon recheck of the alcohol level, it was within normal range. Per his wife, alcohol is available within their home; however, she did state that she did not think he drank alcohol. Patient denies drinking alcohol but possible consumption of alcohol may have contributed to his altered mental status on presentation. He continues to do well and pending  SNF placement.     Updates:  - Pain with urination, UA ordered for further evaluation. He had 850 mL urine output yesterday.  - Blood glucose have been in the 200s or higher. On ISS. Started glipizide (no home glyburide on formulary) and changed diet to Carb control.     #Altered Mental Status, resolved  :: TSH (2.77), B12 (413), SAIGE (positive), CPK (62), RPR (pending), HIV (normal)  :: Unremarkable metabolic panel except elevated glucose (380 at admission, 180 now  :: Alcohol level 44 (elevated) at admission and now <10  Plan:  -Consulted psychiatry:   - Continue Haldol 0.25mg PO Q8 PRN for agitation   - Continue folic acid 1 mg once daily  - Continue thiamine 500 mg IV TID for three days followed by thiamine 100 mg PO once daily      #T2DM  :: A1c 7.4 on 10/28/2024  Plan:  -Hold home Metformin and Pioglitazone   -Continue ISS  -Hypoglycemia protocol  - Start glipizide (no home glyburide on formulary)     #HTN  -Hold home Lisinopril given soft BP  -CTM     #History of Tonic-Clonic Seizure in 2021  ::  Keppra level 10 micrograms/mL  :: EEG showed mild diffuse encephalopathy   Plan:  -Continue home Keppra 500mg BID    #MARILYN  #Depression  #PTSD  -Continue home Buspar 10mg and Zoloft 100mg BID  -Holding Risperidone 1mg daily     Diet: Carb control diet   DVT prophylaxis: Lovenox  Bowel Regimen: Miralax and sennosides  GI Prophylaxis: PPI  Code status: DNR/DNI  NOK: Danuta (wife) - [phone]     Karel Acosta, MS4

## 2024-10-31 VITALS
HEIGHT: 71 IN | WEIGHT: 235.2 LBS | OXYGEN SATURATION: 99 % | BODY MASS INDEX: 32.93 KG/M2 | TEMPERATURE: 97.3 F | RESPIRATION RATE: 18 BRPM | SYSTOLIC BLOOD PRESSURE: 149 MMHG | HEART RATE: 42 BPM | DIASTOLIC BLOOD PRESSURE: 65 MMHG

## 2024-10-31 LAB
ALBUMIN SERPL BCP-MCNC: 4.2 G/DL (ref 3.4–5)
ANION GAP SERPL CALC-SCNC: 12 MMOL/L (ref 10–20)
BASOPHILS # BLD AUTO: 0.04 X10*3/UL (ref 0–0.1)
BASOPHILS NFR BLD AUTO: 0.6 %
BUN SERPL-MCNC: 19 MG/DL (ref 6–23)
CALCIUM SERPL-MCNC: 9.7 MG/DL (ref 8.6–10.6)
CHLORIDE SERPL-SCNC: 103 MMOL/L (ref 98–107)
CO2 SERPL-SCNC: 26 MMOL/L (ref 21–32)
CREAT SERPL-MCNC: 0.98 MG/DL (ref 0.5–1.3)
EGFRCR SERPLBLD CKD-EPI 2021: 80 ML/MIN/1.73M*2
EOSINOPHIL # BLD AUTO: 0.08 X10*3/UL (ref 0–0.4)
EOSINOPHIL NFR BLD AUTO: 1.2 %
ERYTHROCYTE [DISTWIDTH] IN BLOOD BY AUTOMATED COUNT: 13.1 % (ref 11.5–14.5)
GLUCOSE BLD MANUAL STRIP-MCNC: 254 MG/DL (ref 74–99)
GLUCOSE SERPL-MCNC: 271 MG/DL (ref 74–99)
HCT VFR BLD AUTO: 35.7 % (ref 41–52)
HGB BLD-MCNC: 12.2 G/DL (ref 13.5–17.5)
IMM GRANULOCYTES # BLD AUTO: 0.06 X10*3/UL (ref 0–0.5)
IMM GRANULOCYTES NFR BLD AUTO: 0.9 % (ref 0–0.9)
LYMPHOCYTES # BLD AUTO: 1.4 X10*3/UL (ref 0.8–3)
LYMPHOCYTES NFR BLD AUTO: 20.3 %
MAGNESIUM SERPL-MCNC: 1.95 MG/DL (ref 1.6–2.4)
MCH RBC QN AUTO: 31 PG (ref 26–34)
MCHC RBC AUTO-ENTMCNC: 34.2 G/DL (ref 32–36)
MCV RBC AUTO: 91 FL (ref 80–100)
MONOCYTES # BLD AUTO: 0.58 X10*3/UL (ref 0.05–0.8)
MONOCYTES NFR BLD AUTO: 8.4 %
NEUTROPHILS # BLD AUTO: 4.75 X10*3/UL (ref 1.6–5.5)
NEUTROPHILS NFR BLD AUTO: 68.6 %
NRBC BLD-RTO: 0 /100 WBCS (ref 0–0)
PHOSPHATE SERPL-MCNC: 3.2 MG/DL (ref 2.5–4.9)
PLATELET # BLD AUTO: 207 X10*3/UL (ref 150–450)
POTASSIUM SERPL-SCNC: 4.2 MMOL/L (ref 3.5–5.3)
RBC # BLD AUTO: 3.93 X10*6/UL (ref 4.5–5.9)
SODIUM SERPL-SCNC: 137 MMOL/L (ref 136–145)
WBC # BLD AUTO: 6.9 X10*3/UL (ref 4.4–11.3)

## 2024-10-31 PROCEDURE — 36415 COLL VENOUS BLD VENIPUNCTURE: CPT

## 2024-10-31 PROCEDURE — 99239 HOSP IP/OBS DSCHRG MGMT >30: CPT | Performed by: STUDENT IN AN ORGANIZED HEALTH CARE EDUCATION/TRAINING PROGRAM

## 2024-10-31 PROCEDURE — 82947 ASSAY GLUCOSE BLOOD QUANT: CPT

## 2024-10-31 PROCEDURE — 2500000004 HC RX 250 GENERAL PHARMACY W/ HCPCS (ALT 636 FOR OP/ED)

## 2024-10-31 PROCEDURE — 85025 COMPLETE CBC W/AUTO DIFF WBC: CPT

## 2024-10-31 PROCEDURE — 2500000002 HC RX 250 W HCPCS SELF ADMINISTERED DRUGS (ALT 637 FOR MEDICARE OP, ALT 636 FOR OP/ED)

## 2024-10-31 PROCEDURE — 2500000001 HC RX 250 WO HCPCS SELF ADMINISTERED DRUGS (ALT 637 FOR MEDICARE OP)

## 2024-10-31 PROCEDURE — 99222 1ST HOSP IP/OBS MODERATE 55: CPT | Performed by: STUDENT IN AN ORGANIZED HEALTH CARE EDUCATION/TRAINING PROGRAM

## 2024-10-31 PROCEDURE — 83735 ASSAY OF MAGNESIUM: CPT

## 2024-10-31 PROCEDURE — 84100 ASSAY OF PHOSPHORUS: CPT

## 2024-10-31 ASSESSMENT — PAIN SCALES - GENERAL: PAINLEVEL_OUTOF10: 0 - NO PAIN

## 2024-10-31 ASSESSMENT — COLUMBIA-SUICIDE SEVERITY RATING SCALE - C-SSRS
2. HAVE YOU ACTUALLY HAD ANY THOUGHTS OF KILLING YOURSELF?: NO
6. HAVE YOU EVER DONE ANYTHING, STARTED TO DO ANYTHING, OR PREPARED TO DO ANYTHING TO END YOUR LIFE?: NO
1. SINCE LAST CONTACT, HAVE YOU WISHED YOU WERE DEAD OR WISHED YOU COULD GO TO SLEEP AND NOT WAKE UP?: NO

## 2024-10-31 NOTE — CARE PLAN
Problem: Safety - Adult  Goal: Free from fall injury  Outcome: Progressing     Problem: Discharge Planning  Goal: Discharge to home or other facility with appropriate resources  Outcome: Progressing     Problem: Diabetes  Goal: Increase stability of blood glucose readings by end of shift  Outcome: Progressing   The patient's goals for the shift include      The clinical goals for the shift include Patient will remain safe/comfortable and free of falls/injuries during this shift.    Over the shift, the patient remained safe and free from falls and injury.

## 2024-10-31 NOTE — PROGRESS NOTES
Transitional Care Coordinator Progress Note:   Pt is medically ready for discharge to the [facility].  Transportation has been confirmed for 12pm (see SW note from 10/30).  Final gold form, MAR and AVS were sent via [employer]. No other discharge needs anticipated.    Josie Becerra MSN, RN-BC  Transitional Care Coordinator (TCC)  [phone]

## 2024-10-31 NOTE — DISCHARGE INSTRUCTIONS
Dear Mr. Abbott,    You were admitted for a change in your mental status on 10/28/2024. You underwent imaging of your brain that was all normal. We also evaluated you for a possible infection and you had no signs of an infection of your lungs or bladder. Your lab work was all normal except for a high blood glucose level. We started you on two vitamins called thiamine and folic acid to help with your cognition which you will continue to take at home. We checked your HbA1C and it was 7.4% which means your diabetes has been well-controlled with your current medications outside of the hospital. You returned back to your normal self on 10/29/2024. You were medically ready for discharge but required a few additional days in the hospital for skilled nursing facility placement. On 10/30/2024, you stated that you were having some pain while urinating. We check for a urinary tract infection and this was normal. Therefore, we started you on Flomax to help with urination. You were discharged to a skilled nursing facility on 10/31/2024.     Changes to your medications on discharge:  - Start folic acid 1 mg once daily  - Start tamsulosin 0.4 mg once daily  - Start thiamine 100 mg once daily   - Start Miralax 17g once daily as needed for constipation   - Start sennosides 8.6 mg tablet once at bedtime as needed for constipation   - STOP risperidone 1 mg by once daily   - STOP cetirizine 10 mg once daily   - STOP brimonidine 0.2% ophthalmic solution     Appointments on discharge:  - [facility]    If you develop any new or concerning symptoms, please feel free to come back to the emergency room and we would be happy to treat you.    Take care,  Your  Care Team

## 2024-10-31 NOTE — CARE PLAN
The clinical goals for the shift include patient will remain safe/comfortable and free of falls/injuries.    Over the shift, the patient remained safe/comfortable and free of falls/injuries.    Patient was calm and cooperative during shift. He did try to get out of bed once on his own and was able to be redirected back to his bed without any issues and apologized.

## 2024-10-31 NOTE — DISCHARGE SUMMARY
Discharge Diagnosis  AMS (altered mental status)    Issues Requiring Follow-Up  Primary care:  - Follow-up on patients mental status and possible further cognitive testing if needed    Discharge Meds     Medication List      START taking these medications     folic acid 1 mg tablet; Commonly known as: Folvite; Take 1 tablet (1 mg)   by mouth once daily.   polyethylene glycol 17 gram packet; Commonly known as: Glycolax,   Miralax; Take 17 g by mouth once daily as needed (constipation).   sennosides 8.6 mg tablet; Commonly known as: Senokot; Take 2 tablets   (17.2 mg) by mouth as needed at bedtime for constipation.   tamsulosin 0.4 mg 24 hr capsule; Commonly known as: Flomax; Take 1   capsule (0.4 mg) by mouth once daily at bedtime.   thiamine 100 mg tablet; Commonly known as: Vitamin B-1; Take 1 tablet   (100 mg) by mouth once daily. Do not fill before October 31, 2024.     CHANGE how you take these medications     Refresh Celluvisc 1 % ophthalmic solution; Generic drug:   carboxymethylcellulose; What changed: Another medication with the same   name was removed. Continue taking this medication, and follow the   directions you see here.   sertraline 100 mg tablet; Commonly known as: Zoloft; Take 1 tablet (100   mg) by mouth 2 times a day.; What changed: how much to take, when to take   this     CONTINUE taking these medications     albuterol 90 mcg/actuation inhaler   aspirin 81 mg EC tablet   busPIRone 10 mg tablet; Commonly known as: Buspar   glyBURIDE 5 mg tablet; Commonly known as: Diabeta   levETIRAcetam 500 mg tablet; Commonly known as: Keppra   metFORMIN 1,000 mg tablet; Commonly known as: Glucophage   pantoprazole 40 mg EC tablet; Commonly known as: ProtoNix; Take 1 tablet   (40 mg) by mouth once daily. Do not crush, chew, or split.   pioglitazone 45 mg tablet; Commonly known as: Actos   rosuvastatin 20 mg tablet; Commonly known as: Crestor   Vitamin D3 50 MCG (2000 UT) tablet; Generic drug: cholecalciferol      STOP taking these medications     brimonidine 0.2 % ophthalmic solution; Commonly known as: AlphaGAN   cetirizine 10 mg tablet; Commonly known as: ZyrTEC   lisinopril 5 mg tablet   RisperDAL 1 mg tablet; Generic drug: risperiDONE       Test Results Pending At Discharge  Pending Labs       No current pending labs.            Hospital Course  Albert Abbott is a 74 y.o. male with a past medical history of T2DM c/b peripheral neuropathy, HLD, CVA, MARILYN, MDD, history of seizure in 2021, PTSD who presented with altered mental status starting on 10/27/2024. He initially presented to the [facility] ED and there was concern for catatonia so patient received lorazapem 4 mg although there was no documentation of specific catatonic findings or how they changed with administration of lorazepam. Patient was then transferred to [facility] for psychiatry and medicine evaluation. On initial interview on 10/28/2024, the patient was only oriented to self. The patient had no catatonic behaviors on exam and there is a low concern for catatonia as the cause for this patients presentation. He underwent a CTH and CT brain attack angio on initial presentation that showed no acute intracranial process. He also underwent a CXR that showed mild pulmonary edema with no other signs of fluid overload on exam. Lab work was notable for normal TSH (2.77), normal B12 (413), normal CPK (62), syphilis screen (non-reactive), HIV (non-reactive), and positive SAIGE but negative SINA panel. Metabolic evaluation was negative except for mildly elevated blood glucose (HbA1C 7.4%). His UA was also unremarkable. His acute toxicology panel was positive for alcohol (44 mg/dL); however, the patient and his wife denied that he drank alcohol. He was given thiamine and folic acid. On 10/29/2024, the patient was back at his baseline and alert and oriented x 4. We rechecked his alcohol level and it was less than 10 mg/dL, indicating the patient likely consumed alcohol during  his acute mental status change. In discussion with psychiatry, it is most likely that the patient experienced hypoactive delirium complicated by underlying dementia and a known history of major depressive disorder. He also complained of pain while urinating during his admission and an additional UA was checked which was negative. He was started on Flomax for possible BPH. He was medically ready for discharge and was pending SNF placement until 10/31/2024.     Pertinent Physical Exam At Time of Discharge    Physical Exam  Constitutional:       General: He is not in acute distress.     Appearance: He is not ill-appearing.   HENT:      Head: Normocephalic and atraumatic.      Mouth/Throat:      Mouth: Mucous membranes are moist.   Cardiovascular:      Rate and Rhythm: Normal rate and regular rhythm.      Heart sounds: No murmur heard.     No gallop.   Pulmonary:      Effort: Pulmonary effort is normal. No respiratory distress.      Breath sounds: No stridor. No wheezing or rales.   Abdominal:      General: There is no distension.      Palpations: Abdomen is soft.      Tenderness: There is no abdominal tenderness.   Musculoskeletal:      Right lower leg: No edema.      Left lower leg: No edema.   Skin:     General: Skin is warm.      Capillary Refill: Capillary refill takes less than 2 seconds.   Neurological:      General: No focal deficit present.      Comments: Oriented to self, location, time    Outpatient Follow-Up  Future Appointments   Date Time Provider Department Center   1/14/2025  1:30 PM Tani Morrison MD [id number] Luiz Acosta MS4

## 2024-10-31 NOTE — HOSPITAL COURSE
Albert Abbott is a 74 y.o. male with a past medical history of T2DM c/b peripheral neuropathy, HLD, CVA, MARILYN, Depression, history of seizures, PTSD who presents with altered mental status starting on 10/27/2024. He initially presented to the [facility] ED and there was concern for catatonia so patient received lorazapem 4 mg although there was no documentation of specific catatonic findings or how they changed with administration of lorazepam. Patient was then transferred to [facility] for psychiatry and medicine evaluation. On initial interview on 10/28/2024, the patient was only oriented to self. He underwent a CTH and CT brain attack angio that showed no acute intracranial process. He also underwent a CXR that showed mild pulmonary edema with no other signs of fluid overload on exam. Lab work was notable for normal TSH (2.77), normal B12 (413), normal CPK (62), syphilis screen (non-reactive), HIV (non-reactive), and positive SAIGE but negative SINA panel. Metabolic evaluation was negative except for mildly elevated blood glucose (HbA1C 7.4%). His UA was also unremarkable. His acute toxicology panel was positive for alcohol (44 mg/dL); however, the patient and his wife denied that he drank alcohol. He was given thiamine and folic acid. On 10/29/2024, the patient was back at his baseline and alert and oriented x 4. We rechecked his alcohol level and it was less than 10 mg/dL, indicating the patient likely consumed alcohol during his acute mental status change. In discussion with psychiatry, it is most likely that the patient experienced hypoactive delirium complicated by underlying dementia and a known history of major depressive disorder. He also complained of pain while urination during his admission and an additional UA was checked which was negative. He was started on Flomax for possible BPH. He was medically ready for discharge and was pending SNF placement until 10/31/2024.

## 2024-10-31 NOTE — PROGRESS NOTES
"Wanted him into using the [facility] Abbott \"Taras\" is a 74 y.o. male on hospital day 3 of admission presenting with AMS (altered mental status).    Subjective   Patient seen this morning bedside with medical student present.  Patient said he was feeling good today.  When asked regarding sleep the patient stated he was sleeping on and off but this he feels like it is normal for him.  When asked regarding depression and anxiety symptoms the patient denies any current symptoms. The patient did state that he talked to his wife and she has been caring for all the animals at home.  When asked if the patient knew where he is going he stated he knew he was going to a rehab today. When asked he denies SI or HI AVH.    Additional Information:   Patient was calm and cooperative during shift. He did try to get out of bed once on his own and was able to be redirected back to his bed without any issues and apologized.     Objective   No rigidity present in upper limbs very flexible.     Vital Signs:      10/29/2024    11:56 PM 10/30/2024     8:09 AM 10/30/2024    11:02 AM 10/30/2024    11:57 AM 10/30/2024     3:40 PM 10/30/2024    11:17 PM 10/31/2024     7:49 AM   Vitals   Systolic 108 140 118 127 120 138 149   Diastolic 58 61 72 51 54 60 65   Heart Rate 51 53 65 52 43 59 42   Temp 36.6 °C (97.9 °F) 36.5 °C (97.7 °F)  36.4 °C (97.5 °F) 36.4 °C (97.5 °F) 36.3 °C (97.3 °F)    Resp  18  18 18  18      Intake/Output last 3 Shifts:  I/O last 3 completed shifts:  In: - (0 mL/kg)   Out: 2375 (22.3 mL/kg) [Urine:2375 (0.6 mL/kg/hr)]  Weight: 106.7 kg     Mental Status Exam:  General/Appearance: Mild Psychological Distress, appears stated age, in hospital gown, laying in bed, body habitus: obese, grooming/hygiene is reasonable for setting, bald, IV in place  Attitude/Behavior: engages in interview, cooperative, appropriate eye contact, calm  Motor Activity: no psychomotor agitation/retardation, no tics/tremors, no EPS/TD, gait: " "not assessed  Mood: \"good\"  Affect: Quality-mood congruent, Intensity-blunted, Range-full  Speech: normal rate/tone/volume/prosody/syntax  Thought Process: linear, circumstantial, organized  Thought Content: denies SI/HI, Delusional thinking: none elicited  Thought Perception: denies AVH  Cognition: alert & oriented x 4, no deficits in attention/concentration noted, good fund of knowledge, recent and remote memory intact  Insight: fair  Judgment: fair    Current Medications  Scheduled   aspirin, 81 mg, oral, Daily  busPIRone, 10 mg, oral, BID  cholecalciferol, 4,000 Units, oral, Daily  enoxaparin, 40 mg, subcutaneous, q24h  folic acid, 1 mg, oral, Daily  glipiZIDE, 10 mg, oral, Daily before breakfast  insulin lispro, 0-10 Units, subcutaneous, TID  levETIRAcetam, 500 mg, oral, BID  [Held by provider] lisinopril, 5 mg, oral, Daily  lubricating eye drops, 1 drop, Both Eyes, Nightly  pantoprazole, 40 mg, oral, Daily  rosuvastatin, 20 mg, oral, Daily  sertraline, 100 mg, oral, BID  tamsulosin, 0.4 mg, oral, Nightly  thiamine, 100 mg, oral, Daily      Continuous      PRN   PRN medications: albuterol, haloperidol, polyethylene glycol, sennosides     Labs  Results for orders placed or performed during the hospital encounter of 10/28/24 (from the past 24 hours)   POCT GLUCOSE   Result Value Ref Range    POCT Glucose 239 (H) 74 - 99 mg/dL   CBC and Auto Differential   Result Value Ref Range    WBC 7.8 4.4 - 11.3 x10*3/uL    nRBC 0.0 0.0 - 0.0 /100 WBCs    RBC 3.99 (L) 4.50 - 5.90 x10*6/uL    Hemoglobin 12.4 (L) 13.5 - 17.5 g/dL    Hematocrit 38.1 (L) 41.0 - 52.0 %    MCV 96 80 - 100 fL    MCH 31.1 26.0 - 34.0 pg    MCHC 32.5 32.0 - 36.0 g/dL    RDW 13.0 11.5 - 14.5 %    Platelets 217 150 - 450 x10*3/uL    Neutrophils % 67.9 40.0 - 80.0 %    Immature Granulocytes %, Automated 0.8 0.0 - 0.9 %    Lymphocytes % 21.5 13.0 - 44.0 %    Monocytes % 8.2 2.0 - 10.0 %    Eosinophils % 1.2 0.0 - 6.0 %    Basophils % 0.4 0.0 - 2.0 %    " Neutrophils Absolute 5.29 1.60 - 5.50 x10*3/uL    Immature Granulocytes Absolute, Automated 0.06 0.00 - 0.50 x10*3/uL    Lymphocytes Absolute 1.67 0.80 - 3.00 x10*3/uL    Monocytes Absolute 0.64 0.05 - 0.80 x10*3/uL    Eosinophils Absolute 0.09 0.00 - 0.40 x10*3/uL    Basophils Absolute 0.03 0.00 - 0.10 x10*3/uL   Renal Function Panel   Result Value Ref Range    Glucose 237 (H) 74 - 99 mg/dL    Sodium 140 136 - 145 mmol/L    Potassium 4.0 3.5 - 5.3 mmol/L    Chloride 105 98 - 107 mmol/L    Bicarbonate 27 21 - 32 mmol/L    Anion Gap 12 10 - 20 mmol/L    Urea Nitrogen 18 6 - 23 mg/dL    Creatinine 0.98 0.50 - 1.30 mg/dL    eGFR 80 >60 mL/min/1.73m*2    Calcium 9.6 8.6 - 10.6 mg/dL    Phosphorus 3.1 2.5 - 4.9 mg/dL    Albumin 4.1 3.4 - 5.0 g/dL   Magnesium   Result Value Ref Range    Magnesium 1.91 1.60 - 2.40 mg/dL   Urinalysis with Reflex Culture and Microscopic   Result Value Ref Range    Color, Urine Light-Yellow Light-Yellow, Yellow, Dark-Yellow    Appearance, Urine Clear Clear    Specific Gravity, Urine 1.011 1.005 - 1.035    pH, Urine 6.5 5.0, 5.5, 6.0, 6.5, 7.0, 7.5, 8.0    Protein, Urine NEGATIVE NEGATIVE, 10 (TRACE), 20 (TRACE) mg/dL    Glucose, Urine 500 (3+) (A) Normal mg/dL    Blood, Urine NEGATIVE NEGATIVE    Ketones, Urine NEGATIVE NEGATIVE mg/dL    Bilirubin, Urine NEGATIVE NEGATIVE    Urobilinogen, Urine Normal Normal mg/dL    Nitrite, Urine NEGATIVE NEGATIVE    Leukocyte Esterase, Urine NEGATIVE NEGATIVE   Extra Urine Gray Tube   Result Value Ref Range    Extra Tube Hold for add-ons.    POCT GLUCOSE   Result Value Ref Range    POCT Glucose 354 (H) 74 - 99 mg/dL   POCT GLUCOSE   Result Value Ref Range    POCT Glucose 196 (H) 74 - 99 mg/dL   POCT GLUCOSE   Result Value Ref Range    POCT Glucose 236 (H) 74 - 99 mg/dL   CBC and Auto Differential   Result Value Ref Range    WBC 6.9 4.4 - 11.3 x10*3/uL    nRBC 0.0 0.0 - 0.0 /100 WBCs    RBC 3.93 (L) 4.50 - 5.90 x10*6/uL    Hemoglobin 12.2 (L) 13.5 - 17.5  "g/dL    Hematocrit 35.7 (L) 41.0 - 52.0 %    MCV 91 80 - 100 fL    MCH 31.0 26.0 - 34.0 pg    MCHC 34.2 32.0 - 36.0 g/dL    RDW 13.1 11.5 - 14.5 %    Platelets 207 150 - 450 x10*3/uL    Neutrophils % 68.6 40.0 - 80.0 %    Immature Granulocytes %, Automated 0.9 0.0 - 0.9 %    Lymphocytes % 20.3 13.0 - 44.0 %    Monocytes % 8.4 2.0 - 10.0 %    Eosinophils % 1.2 0.0 - 6.0 %    Basophils % 0.6 0.0 - 2.0 %    Neutrophils Absolute 4.75 1.60 - 5.50 x10*3/uL    Immature Granulocytes Absolute, Automated 0.06 0.00 - 0.50 x10*3/uL    Lymphocytes Absolute 1.40 0.80 - 3.00 x10*3/uL    Monocytes Absolute 0.58 0.05 - 0.80 x10*3/uL    Eosinophils Absolute 0.08 0.00 - 0.40 x10*3/uL    Basophils Absolute 0.04 0.00 - 0.10 x10*3/uL   POCT GLUCOSE   Result Value Ref Range    POCT Glucose 254 (H) 74 - 99 mg/dL        Imaging  No results found.     Psychiatric Risk Assessment  Acute Risk of Harm to Others is Considered: Low  Acute Risk of Harm to Self is Considered: Low    Assessment:  Albert Abbott \"Kamlesh" is a 74 y.o. male with a past psychiatric history of PTSD and dementia and a past medical history of T2DM. EPAT was consulted for altered mental status when his wife called 911 because he was unable to recognize her. In the ED, he received lorazapem 2 mg for concern that he was catatonic, although there was no documentation of specific catatonic findings or how they changed with administration of lorazepam. Per conversation with his nurse, however, pt became more aggressive after he received lorazepam, and was then given an additional 2 mg IV (for a total of 4 mg lorazepam during ED visit), which made him calmer. Per conversation with pt's physician, however, pt's catalepsy reportedly improved after the first administration of lorazepam, so reports of catatonic sx after lorazepam are conflicting.    10/31: Patient seen this morning. Overall significantly improved compared to previous days, no longer really delirious.  He continues to " be aware of person place time and situation. No overt psychiatric concerns at this time. Overall he seems back to baseline.   No medication changes at this time. Patient will be DCing today.    Impression:  #Delirium - hypoactive type  # major neurocognitive disorder   #PTSD, per chart  #MDD, per chart     Recommendations:  Safety/Monitoring:  Patient does not meet criteria for inpatient psychiatric admission  Patient does not require 1:1 observation from a psychiatric perspective, defer to primary team  Daily interdisciplinary safety and planning huddle with Psychiatry  Video monitoring as with all patients on the MPU  Psychiatry will follow patient daily while on the MPU    Medications:  - Cont Sertraline 100mg PO BID  - Cont Buspar 10mg BID  - Cont Haldol 0.25mg PO Q8 PRN for agitation    Considerations:  Therapies recommended:  will continue to assess for benefit and meaningful participation    Delirium Guidelines  Provide glasses, hearing aids and/or communication boards as needed for impairments  Frequent reorientation, minimize room and staff changes  Open blinds during the day, dark/quiet room at night   Minimal interruptions and daytime naps  Early evaluation and intervention by PT, out of bed as tolerated  Minimize use of restraints   Minimize use of benzodiazepines, anticholinergic medications, and opiates (while ensuring adequate treatment of pain)  Keep Mg>2, K>4 (as able)  Ensure regular bowel and bladder function (as able)    Disposition/Discharge Planning:  SW following, appreciate assistance      Multidisciplinary Rounding  Consulting Physician, Fellow, Medical Student, Charge Nurse, Nurse, Pharmacist, Social Work, Care Coordinator, Dietician, Music Therapy, Art Therapy, and Recreational Therapy    Medication Consent  N/A - Consult Service    John Barber MD   PGY-5 Consult Liaison Psychiatry Fellow.

## 2024-11-04 NOTE — PROGRESS NOTES
"Music Therapy Note    Albert ALSTON \"Bill\" Abbott     Therapy Session  Referral Type: New referral this admission  Visit Type: New visit  Session Start Time: 1446  Session End Time: 1448  Intervention Delivery: In-person  Conflict of Service: Declined treatment               Treatment/Interventions       Post-assessment  Total Session Time (min): 2 minutes    Narrative  Assessment Detail: MT attempted a session but pt declined for today. MT will continue to f/u per request    Education Documentation  No documentation found.          "

## 2024-11-05 NOTE — PROGRESS NOTES
"Music Therapy Note    Albert ALSTON \"Bill\" Abbott     Therapy Session  Referral Type: New referral this admission  Visit Type: Follow-up visit  Session Start Time: 1620  Session End Time: 1622  Intervention Delivery: In-person  Conflict of Service: Declined treatment               Treatment/Interventions       Post-assessment  Total Session Time (min): 2 minutes    Narrative  Assessment Detail: MT attempted session but pt declined and requested a f/u session later today. MT will f/u accordingly    Education Documentation  No documentation found.          "

## 2024-11-09 LAB
ATRIAL RATE: 84 BPM
P AXIS: 72 DEGREES
P OFFSET: 102 MS
P ONSET: 22 MS
PR INTERVAL: 392 MS
Q ONSET: 218 MS
QRS COUNT: 14 BEATS
QRS DURATION: 92 MS
QT INTERVAL: 370 MS
QTC CALCULATION(BAZETT): 437 MS
QTC FREDERICIA: 413 MS
R AXIS: 74 DEGREES
T AXIS: 56 DEGREES
T OFFSET: 403 MS
VENTRICULAR RATE: 84 BPM

## 2024-11-12 NOTE — DOCUMENTATION CLARIFICATION NOTE
"    PATIENT:               ELENITA GARAY  ACCT #:                  [id number]  MRN:                       [id number]  :                       1950  ADMIT DATE:       10/28/2024 3:33 AM  DISCH DATE:        10/31/2024 11:35 AM  RESPONDING PROVIDER #:        [id number]          PROVIDER RESPONSE TEXT:    Worsening dementia    CDI QUERY TEXT:    Clarification        Instruction:    Based on your assessment of the patient and the clinical information, please provide the requested documentation by clicking on the appropriate radio button and enter any additional information if prompted.    Question: Please further clarify the most likely etiology of the altered mental status as    When answering this query, please exercise your independent professional judgment. The fact that a question is being asked, does not imply that any particular answer is desired or expected.    The patient's clinical indicators include:  Clinical Information: 74 yr old male hx HTN, dementia who presented with AMS.  VS on admit - 36.6 - 74 - 20  127/63  pox RA - 94%    Clinical Indicators: wife called EMS as pt's had AMS, did not recognize her and unable to be redirected. Last 2 yrs more forgetful and diagnosed with early onset dementia.  Pt with delirium during stay. CT brain angio - no intracranial process, toxicology positive for alcohol (44 mg/dL) \" indicating the patient likely consumed alcohol during his acute mental status change. In discussion with psychiatry, it is most likely that the patient experienced hypoactive delirium complicated by underlying dementia and a known history of major depressive disorder. \" EEG on 10/28 showed mild diffuse encephalopathy  Documentation shows from H and P on 10/28 \"his presentation seems more consistent with depression vs worsening dementia\"    Treatment: Close monitoring on med psych unit, CT of brain, thiamine, folic acid, EEG, psych consult    Risk Factors: advanced age, recent diagnosis of " dementia.  Options provided:  -- Toxic encephalopathy 2/2 alcohol consumption  -- Worsening dementia  -- Other encephalopathy, Please specify encephalopathy type  -- Other - I will add my own diagnosis  -- Refer to Clinical Documentation Reviewer    Query created by: Jahaira Rodriguez on 11/7/2024 10:26 AM      Electronically signed by:  ROBERT RICE DO 11/12/2024 7:33 AM

## 2024-11-13 ENCOUNTER — TELEPHONE (OUTPATIENT)
Dept: RHEUMATOLOGY | Facility: CLINIC | Age: 74
End: 2024-11-13
Payer: MEDICARE

## 2024-11-13 NOTE — TELEPHONE ENCOUNTER
Patients wife called the answering service.  Albert is in a nursing home. His blood sugar today is 388.  They don't seem to concerned but the wife is.  He is currently taking 2 1000's units of metformin. 2 times a day and glyburide 10 mg once a day.  Wife would like to know what she can have the nursing home do.    [phone] (home)

## 2024-11-13 NOTE — TELEPHONE ENCOUNTER
Called 3x and there was no answer. Left a voicemail and stated I will call back tomorrow.      11/13/24 at 6:23 PM - Tani Morrison MD

## 2024-11-14 ENCOUNTER — TELEPHONE (OUTPATIENT)
Dept: PRIMARY CARE | Facility: CLINIC | Age: 74
End: 2024-11-14
Payer: MEDICARE

## 2024-11-14 NOTE — TELEPHONE ENCOUNTER
"I called Aga Frias you have the correct phone number, she said his sugar was 134 today, but she is  bringing him home today, they don't know what they are doing and his telephone in his room still doesn't work, She said \"what ever happens will fall on me.\" I told her you left a voicemail and she said she never heard the phone ring.     "

## 2024-11-14 NOTE — TELEPHONE ENCOUNTER
Aga Grajeda's wife called she is concerned because Taras is at [facility], he was sent there after being  hospitalized,  yesterday his Blood sugar was 388, the facility is not concerned, and she left a message and they are not calling her back. She wants to know should she take him to the hospital. Or tell them to call the squad and have him go to ER?  Please advise

## 2024-11-25 ENCOUNTER — LAB (OUTPATIENT)
Dept: LAB | Facility: LAB | Age: 74
End: 2024-11-25
Payer: MEDICARE

## 2024-11-25 ENCOUNTER — APPOINTMENT (OUTPATIENT)
Dept: PRIMARY CARE | Facility: CLINIC | Age: 74
End: 2024-11-25
Payer: MEDICARE

## 2024-11-25 VITALS
WEIGHT: 235.3 LBS | RESPIRATION RATE: 10 BRPM | SYSTOLIC BLOOD PRESSURE: 102 MMHG | HEART RATE: 36 BPM | BODY MASS INDEX: 32.82 KG/M2 | OXYGEN SATURATION: 98 % | DIASTOLIC BLOOD PRESSURE: 43 MMHG

## 2024-11-25 DIAGNOSIS — I95.9 HYPOTENSION, UNSPECIFIED HYPOTENSION TYPE: ICD-10-CM

## 2024-11-25 DIAGNOSIS — Z09 HOSPITAL DISCHARGE FOLLOW-UP: Primary | ICD-10-CM

## 2024-11-25 DIAGNOSIS — F33.1 MODERATE EPISODE OF RECURRENT MAJOR DEPRESSIVE DISORDER: ICD-10-CM

## 2024-11-25 DIAGNOSIS — R00.1 BRADYCARDIA: ICD-10-CM

## 2024-11-25 LAB
ANION GAP SERPL CALC-SCNC: 16 MMOL/L (ref 10–20)
BUN SERPL-MCNC: 17 MG/DL (ref 6–23)
CALCIUM SERPL-MCNC: 9.5 MG/DL (ref 8.6–10.6)
CHLORIDE SERPL-SCNC: 105 MMOL/L (ref 98–107)
CO2 SERPL-SCNC: 23 MMOL/L (ref 21–32)
CREAT SERPL-MCNC: 1.03 MG/DL (ref 0.5–1.3)
EGFRCR SERPLBLD CKD-EPI 2021: 75 ML/MIN/1.73M*2
GLUCOSE SERPL-MCNC: 192 MG/DL (ref 74–99)
MAGNESIUM SERPL-MCNC: 1.95 MG/DL (ref 1.6–2.4)
POTASSIUM SERPL-SCNC: 4.6 MMOL/L (ref 3.5–5.3)
SODIUM SERPL-SCNC: 139 MMOL/L (ref 136–145)
TSH SERPL-ACNC: 1.45 MIU/L (ref 0.44–3.98)

## 2024-11-25 PROCEDURE — 84443 ASSAY THYROID STIM HORMONE: CPT

## 2024-11-25 PROCEDURE — 1159F MED LIST DOCD IN RCRD: CPT | Performed by: INTERNAL MEDICINE

## 2024-11-25 PROCEDURE — 83735 ASSAY OF MAGNESIUM: CPT

## 2024-11-25 PROCEDURE — 99213 OFFICE O/P EST LOW 20 MIN: CPT | Performed by: INTERNAL MEDICINE

## 2024-11-25 PROCEDURE — 80048 BASIC METABOLIC PNL TOTAL CA: CPT

## 2024-11-25 PROCEDURE — 1111F DSCHRG MED/CURRENT MED MERGE: CPT | Performed by: INTERNAL MEDICINE

## 2024-11-25 PROCEDURE — 36415 COLL VENOUS BLD VENIPUNCTURE: CPT

## 2024-11-25 ASSESSMENT — PATIENT HEALTH QUESTIONNAIRE - PHQ9
SUM OF ALL RESPONSES TO PHQ9 QUESTIONS 1 AND 2: 2
1. LITTLE INTEREST OR PLEASURE IN DOING THINGS: SEVERAL DAYS
2. FEELING DOWN, DEPRESSED OR HOPELESS: SEVERAL DAYS

## 2024-11-25 ASSESSMENT — ENCOUNTER SYMPTOMS
DIZZINESS: 1
LIGHT-HEADEDNESS: 1
FATIGUE: 1

## 2024-11-25 NOTE — PROGRESS NOTES
"Subjective   Patient ID: Albert Abbott \"Taras\" is a 74 y.o. male who presents for Follow-up.    Pt doing OK since coming back home. Isn't able to tell my why he was hospitalized.    According to his wife pt has been helping out more with housework like unloading the , folding clothes, and taking the trash out.    Does get dizzy and lightheaded when he exerts himself.        Review of Systems   Constitutional:  Positive for fatigue.   Neurological:  Positive for dizziness and light-headedness.       BP (!) 102/43 (BP Location: Left arm, Patient Position: Sitting)   Pulse (!) 36   Resp 10   Wt 107 kg (235 lb 4.8 oz)   SpO2 98%   BMI 32.82 kg/m²   Objective   Physical Exam  Constitutional:       General: He is not in acute distress.     Appearance: He is not ill-appearing, toxic-appearing or diaphoretic.   HENT:      Head: Normocephalic and atraumatic.   Eyes:      Conjunctiva/sclera: Conjunctivae normal.   Cardiovascular:      Rate and Rhythm: Bradycardia present.      Heart sounds: No murmur heard.     No friction rub. No gallop.   Neurological:      Mental Status: He is alert.         Assessment/Plan   Problem List Items Addressed This Visit    None  Visit Diagnoses         Codes    Hospital discharge follow-up    -  Primary Z09    Hypotension, unspecified hypotension type     I95.9    Bradycardia     R00.1    Relevant Orders    Tsh With Reflex To Free T4 If Abnormal    Basic metabolic panel    Magnesium    Moderate episode of recurrent major depressive disorder     F33.1    Relevant Orders    Referral to Geriatrics        -Overall has been more active since hospital discharge. That said remains limited.  -Part of his limitation is getting dizzy/lightheaded easily. This can be side effect from medication vs hypotension/bradycardia. Will decrease lisinopril dose to 2.5mg daily from 5mg. Also checking electrolytes to see if anything else is contributing.  -Pt agreeable with seeing geriatrics. Feel " they would be a good help for him. Referral placed.  -Will see back in 3 months to follow up.         Tani Morrison MD 11/25/24 12:29 PM

## 2025-01-10 ENCOUNTER — HOSPITAL ENCOUNTER (INPATIENT)
Facility: HOSPITAL | Age: 75
End: 2025-01-10
Admitting: INTERNAL MEDICINE
Payer: MEDICARE

## 2025-01-10 ENCOUNTER — APPOINTMENT (OUTPATIENT)
Dept: RADIOLOGY | Facility: HOSPITAL | Age: 75
End: 2025-01-10
Payer: MEDICARE

## 2025-01-10 ENCOUNTER — APPOINTMENT (OUTPATIENT)
Dept: CARDIOLOGY | Facility: HOSPITAL | Age: 75
End: 2025-01-10
Payer: MEDICARE

## 2025-01-10 ENCOUNTER — TELEPHONE (OUTPATIENT)
Dept: PRIMARY CARE | Facility: CLINIC | Age: 75
End: 2025-01-10
Payer: MEDICARE

## 2025-01-10 DIAGNOSIS — R53.1 WEAKNESS: ICD-10-CM
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
## 2025-01-10 DIAGNOSIS — N30.00 ACUTE CYSTITIS WITHOUT HEMATURIA: Primary | ICD-10-CM

## 2025-01-10 DIAGNOSIS — R79.89 ELEVATED BRAIN NATRIURETIC PEPTIDE (BNP) LEVEL: ICD-10-CM

## 2025-01-10 DIAGNOSIS — R79.89 ELEVATED TROPONIN: ICD-10-CM

## 2025-01-10 DIAGNOSIS — R55 SYNCOPE AND COLLAPSE: ICD-10-CM

## 2025-01-10 LAB
ALBUMIN SERPL BCP-MCNC: 4.3 G/DL (ref 3.4–5)
ALP SERPL-CCNC: 75 U/L (ref 33–136)
ALT SERPL W P-5'-P-CCNC: 18 U/L (ref 10–52)
ANION GAP BLDV CALCULATED.4IONS-SCNC: 11 MMOL/L (ref 10–25)
ANION GAP SERPL CALC-SCNC: 18 MMOL/L (ref 10–20)
APAP SERPL-MCNC: <10 UG/ML
APPEARANCE UR: CLEAR
APTT PPP: 29 SECONDS (ref 27–38)
AST SERPL W P-5'-P-CCNC: 18 U/L (ref 9–39)
BACTERIA #/AREA URNS AUTO: ABNORMAL /HPF
BASE EXCESS BLDV CALC-SCNC: -2 MMOL/L (ref -2–3)
BASOPHILS # BLD AUTO: 0.05 X10*3/UL (ref 0–0.1)
BASOPHILS NFR BLD AUTO: 0.5 %
BILIRUB SERPL-MCNC: 0.7 MG/DL (ref 0–1.2)
BILIRUB UR STRIP.AUTO-MCNC: NEGATIVE MG/DL
BNP SERPL-MCNC: 581 PG/ML (ref 0–99)
BODY TEMPERATURE: 37 DEGREES CELSIUS
BUN SERPL-MCNC: 25 MG/DL (ref 6–23)
CA-I BLDV-SCNC: 1.25 MMOL/L (ref 1.1–1.33)
CALCIUM SERPL-MCNC: 9.5 MG/DL (ref 8.6–10.3)
CARDIAC TROPONIN I PNL SERPL HS: 148 NG/L (ref 0–20)
CARDIAC TROPONIN I PNL SERPL HS: 159 NG/L (ref 0–20)
CARDIAC TROPONIN I PNL SERPL HS: 180 NG/L (ref 0–20)
CHLORIDE BLDV-SCNC: 102 MMOL/L (ref 98–107)
CHLORIDE SERPL-SCNC: 101 MMOL/L (ref 98–107)
CO2 SERPL-SCNC: 19 MMOL/L (ref 21–32)
COLOR UR: ABNORMAL
CREAT SERPL-MCNC: 1.11 MG/DL (ref 0.5–1.3)
EGFRCR SERPLBLD CKD-EPI 2021: 69 ML/MIN/1.73M*2
EOSINOPHIL # BLD AUTO: 0 X10*3/UL (ref 0–0.4)
EOSINOPHIL NFR BLD AUTO: 0 %
ERYTHROCYTE [DISTWIDTH] IN BLOOD BY AUTOMATED COUNT: 13.2 % (ref 11.5–14.5)
ETHANOL SERPL-MCNC: <10 MG/DL
FLUAV RNA RESP QL NAA+PROBE: NOT DETECTED
FLUBV RNA RESP QL NAA+PROBE: NOT DETECTED
GLUCOSE BLD MANUAL STRIP-MCNC: 245 MG/DL (ref 74–99)
GLUCOSE BLD MANUAL STRIP-MCNC: 95 MG/DL (ref 74–99)
GLUCOSE BLDV-MCNC: 378 MG/DL (ref 74–99)
GLUCOSE SERPL-MCNC: 345 MG/DL (ref 74–99)
GLUCOSE UR STRIP.AUTO-MCNC: ABNORMAL MG/DL
HCO3 BLDV-SCNC: 23.7 MMOL/L (ref 22–26)
HCT VFR BLD AUTO: 39.5 % (ref 41–52)
HCT VFR BLD EST: 38 % (ref 41–52)
HGB BLD-MCNC: 12.8 G/DL (ref 13.5–17.5)
HGB BLDV-MCNC: 12.6 G/DL (ref 13.5–17.5)
HOLD SPECIMEN: NORMAL
IMM GRANULOCYTES # BLD AUTO: 0.06 X10*3/UL (ref 0–0.5)
IMM GRANULOCYTES NFR BLD AUTO: 0.6 % (ref 0–0.9)
INHALED O2 CONCENTRATION: 21 %
INR PPP: 1.1 (ref 0.9–1.1)
KETONES UR STRIP.AUTO-MCNC: ABNORMAL MG/DL
LACTATE BLDV-SCNC: 2.6 MMOL/L (ref 0.4–2)
LEUKOCYTE ESTERASE UR QL STRIP.AUTO: ABNORMAL
LEVETIRACETAM SERPL-MCNC: 4 UG/ML (ref 10–40)
LYMPHOCYTES # BLD AUTO: 0.84 X10*3/UL (ref 0.8–3)
LYMPHOCYTES NFR BLD AUTO: 7.7 %
MAGNESIUM SERPL-MCNC: 1.69 MG/DL (ref 1.6–2.4)
MCH RBC QN AUTO: 31.5 PG (ref 26–34)
MCHC RBC AUTO-ENTMCNC: 32.4 G/DL (ref 32–36)
MCV RBC AUTO: 97 FL (ref 80–100)
MONOCYTES # BLD AUTO: 0.55 X10*3/UL (ref 0.05–0.8)
MONOCYTES NFR BLD AUTO: 5.1 %
NEUTROPHILS # BLD AUTO: 9.35 X10*3/UL (ref 1.6–5.5)
NEUTROPHILS NFR BLD AUTO: 86.1 %
NITRITE UR QL STRIP.AUTO: NEGATIVE
NRBC BLD-RTO: 0 /100 WBCS (ref 0–0)
OXYHGB MFR BLDV: 58 % (ref 45–75)
PCO2 BLDV: 43 MM HG (ref 41–51)
PH BLDV: 7.35 PH (ref 7.33–7.43)
PH UR STRIP.AUTO: 6 [PH]
PLATELET # BLD AUTO: 217 X10*3/UL (ref 150–450)
PO2 BLDV: 35 MM HG (ref 35–45)
POTASSIUM BLDV-SCNC: 4.5 MMOL/L (ref 3.5–5.3)
POTASSIUM SERPL-SCNC: 4.4 MMOL/L (ref 3.5–5.3)
PROT SERPL-MCNC: 7.3 G/DL (ref 6.4–8.2)
PROT UR STRIP.AUTO-MCNC: NEGATIVE MG/DL
PROTHROMBIN TIME: 12.8 SECONDS (ref 9.8–12.8)
RBC # BLD AUTO: 4.06 X10*6/UL (ref 4.5–5.9)
RBC # UR STRIP.AUTO: NEGATIVE /UL
RBC #/AREA URNS AUTO: ABNORMAL /HPF
SALICYLATES SERPL-MCNC: <3 MG/DL
SAO2 % BLDV: 60 % (ref 45–75)
SARS-COV-2 RNA RESP QL NAA+PROBE: NOT DETECTED
SODIUM BLDV-SCNC: 132 MMOL/L (ref 136–145)
SODIUM SERPL-SCNC: 134 MMOL/L (ref 136–145)
SP GR UR STRIP.AUTO: 1.01
SQUAMOUS #/AREA URNS AUTO: ABNORMAL /HPF
UROBILINOGEN UR STRIP.AUTO-MCNC: NORMAL MG/DL
WBC # BLD AUTO: 10.9 X10*3/UL (ref 4.4–11.3)
WBC #/AREA URNS AUTO: ABNORMAL /HPF

## 2025-01-10 PROCEDURE — 2500000005 HC RX 250 GENERAL PHARMACY W/O HCPCS: Performed by: INTERNAL MEDICINE

## 2025-01-10 PROCEDURE — 85025 COMPLETE CBC W/AUTO DIFF WBC: CPT

## 2025-01-10 PROCEDURE — 2500000001 HC RX 250 WO HCPCS SELF ADMINISTERED DRUGS (ALT 637 FOR MEDICARE OP)

## 2025-01-10 PROCEDURE — 2500000004 HC RX 250 GENERAL PHARMACY W/ HCPCS (ALT 636 FOR OP/ED): Performed by: INTERNAL MEDICINE

## 2025-01-10 PROCEDURE — 87636 SARSCOV2 & INF A&B AMP PRB: CPT

## 2025-01-10 PROCEDURE — 93005 ELECTROCARDIOGRAM TRACING: CPT

## 2025-01-10 PROCEDURE — 71275 CT ANGIOGRAPHY CHEST: CPT | Performed by: RADIOLOGY

## 2025-01-10 PROCEDURE — 84132 ASSAY OF SERUM POTASSIUM: CPT

## 2025-01-10 PROCEDURE — 99285 EMERGENCY DEPT VISIT HI MDM: CPT | Mod: 25

## 2025-01-10 PROCEDURE — 84484 ASSAY OF TROPONIN QUANT: CPT

## 2025-01-10 PROCEDURE — 85610 PROTHROMBIN TIME: CPT

## 2025-01-10 PROCEDURE — 2500000002 HC RX 250 W HCPCS SELF ADMINISTERED DRUGS (ALT 637 FOR MEDICARE OP, ALT 636 FOR OP/ED): Performed by: INTERNAL MEDICINE

## 2025-01-10 PROCEDURE — 74174 CTA ABD&PLVS W/CONTRAST: CPT

## 2025-01-10 PROCEDURE — 71045 X-RAY EXAM CHEST 1 VIEW: CPT

## 2025-01-10 PROCEDURE — 82947 ASSAY GLUCOSE BLOOD QUANT: CPT

## 2025-01-10 PROCEDURE — 80177 DRUG SCRN QUAN LEVETIRACETAM: CPT | Mod: PARLAB

## 2025-01-10 PROCEDURE — 70450 CT HEAD/BRAIN W/O DYE: CPT | Performed by: RADIOLOGY

## 2025-01-10 PROCEDURE — 80179 DRUG ASSAY SALICYLATE: CPT

## 2025-01-10 PROCEDURE — 81001 URINALYSIS AUTO W/SCOPE: CPT

## 2025-01-10 PROCEDURE — 99222 1ST HOSP IP/OBS MODERATE 55: CPT | Performed by: INTERNAL MEDICINE

## 2025-01-10 PROCEDURE — 87086 URINE CULTURE/COLONY COUNT: CPT | Mod: PARLAB

## 2025-01-10 PROCEDURE — 72125 CT NECK SPINE W/O DYE: CPT

## 2025-01-10 PROCEDURE — 74174 CTA ABD&PLVS W/CONTRAST: CPT | Performed by: RADIOLOGY

## 2025-01-10 PROCEDURE — 83735 ASSAY OF MAGNESIUM: CPT

## 2025-01-10 PROCEDURE — 36415 COLL VENOUS BLD VENIPUNCTURE: CPT

## 2025-01-10 PROCEDURE — 85018 HEMOGLOBIN: CPT

## 2025-01-10 PROCEDURE — 81003 URINALYSIS AUTO W/O SCOPE: CPT

## 2025-01-10 PROCEDURE — 2500000004 HC RX 250 GENERAL PHARMACY W/ HCPCS (ALT 636 FOR OP/ED)

## 2025-01-10 PROCEDURE — 96375 TX/PRO/DX INJ NEW DRUG ADDON: CPT

## 2025-01-10 PROCEDURE — 72125 CT NECK SPINE W/O DYE: CPT | Performed by: RADIOLOGY

## 2025-01-10 PROCEDURE — 83880 ASSAY OF NATRIURETIC PEPTIDE: CPT

## 2025-01-10 PROCEDURE — 71045 X-RAY EXAM CHEST 1 VIEW: CPT | Performed by: RADIOLOGY

## 2025-01-10 PROCEDURE — 80143 DRUG ASSAY ACETAMINOPHEN: CPT

## 2025-01-10 PROCEDURE — 2500000001 HC RX 250 WO HCPCS SELF ADMINISTERED DRUGS (ALT 637 FOR MEDICARE OP): Performed by: INTERNAL MEDICINE

## 2025-01-10 PROCEDURE — 96374 THER/PROPH/DIAG INJ IV PUSH: CPT

## 2025-01-10 PROCEDURE — 2550000001 HC RX 255 CONTRASTS

## 2025-01-10 PROCEDURE — 1200000002 HC GENERAL ROOM WITH TELEMETRY DAILY

## 2025-01-10 PROCEDURE — 70450 CT HEAD/BRAIN W/O DYE: CPT

## 2025-01-10 RX ORDER — TALC
3 POWDER (GRAM) TOPICAL NIGHTLY PRN
Status: DISCONTINUED | OUTPATIENT
Start: 2025-01-10 | End: 2025-01-12

## 2025-01-10 RX ORDER — HALOPERIDOL 5 MG/ML
2 INJECTION INTRAMUSCULAR EVERY 6 HOURS PRN
Status: DISCONTINUED | OUTPATIENT
Start: 2025-01-10 | End: 2025-01-12

## 2025-01-10 RX ORDER — CEFTRIAXONE 1 G/50ML
1 INJECTION, SOLUTION INTRAVENOUS EVERY 24 HOURS
Status: DISPENSED | OUTPATIENT
Start: 2025-01-10

## 2025-01-10 RX ORDER — ASPIRIN 81 MG/1
81 TABLET ORAL DAILY
Status: DISPENSED | OUTPATIENT
Start: 2025-01-11

## 2025-01-10 RX ORDER — ACETAMINOPHEN 325 MG/1
650 TABLET ORAL EVERY 6 HOURS PRN
Status: ACTIVE | OUTPATIENT
Start: 2025-01-10

## 2025-01-10 RX ORDER — FOLIC ACID 1 MG/1
1 TABLET ORAL DAILY
Status: DISCONTINUED | OUTPATIENT
Start: 2025-01-10 | End: 2025-01-10

## 2025-01-10 RX ORDER — MULTIVIT-MIN/IRON FUM/FOLIC AC 7.5 MG-4
1 TABLET ORAL DAILY
Status: DISCONTINUED | OUTPATIENT
Start: 2025-01-10 | End: 2025-01-10

## 2025-01-10 RX ORDER — LISINOPRIL 5 MG/1
2.5 TABLET ORAL DAILY
Status: ACTIVE | OUTPATIENT
Start: 2025-01-10

## 2025-01-10 RX ORDER — RISPERIDONE 1 MG/1
1 TABLET ORAL NIGHTLY
Status: DISPENSED | OUTPATIENT
Start: 2025-01-10

## 2025-01-10 RX ORDER — LORAZEPAM 2 MG/ML
1 INJECTION INTRAMUSCULAR EVERY 2 HOUR PRN
Status: DISCONTINUED | OUTPATIENT
Start: 2025-01-10 | End: 2025-01-10

## 2025-01-10 RX ORDER — PHENOBARBITAL SODIUM 65 MG/ML
260 INJECTION, SOLUTION INTRAMUSCULAR; INTRAVENOUS ONCE
Status: DISCONTINUED | OUTPATIENT
Start: 2025-01-10 | End: 2025-01-10

## 2025-01-10 RX ORDER — SERTRALINE HYDROCHLORIDE 100 MG/1
100 TABLET, FILM COATED ORAL 2 TIMES DAILY
Status: DISPENSED | OUTPATIENT
Start: 2025-01-10

## 2025-01-10 RX ORDER — INSULIN LISPRO 100 [IU]/ML
0-15 INJECTION, SOLUTION INTRAVENOUS; SUBCUTANEOUS
Status: DISPENSED | OUTPATIENT
Start: 2025-01-10

## 2025-01-10 RX ORDER — LORAZEPAM 2 MG/ML
2 INJECTION INTRAMUSCULAR EVERY 2 HOUR PRN
Status: DISCONTINUED | OUTPATIENT
Start: 2025-01-10 | End: 2025-01-10

## 2025-01-10 RX ORDER — LORAZEPAM 2 MG/ML
0.5 INJECTION INTRAMUSCULAR EVERY 2 HOUR PRN
Status: DISCONTINUED | OUTPATIENT
Start: 2025-01-10 | End: 2025-01-10

## 2025-01-10 RX ORDER — CETIRIZINE HYDROCHLORIDE 10 MG/1
10 TABLET ORAL DAILY
Status: ON HOLD | COMMUNITY
Start: 2024-11-29

## 2025-01-10 RX ORDER — LANOLIN ALCOHOL/MO/W.PET/CERES
100 CREAM (GRAM) TOPICAL DAILY
Status: DISCONTINUED | OUTPATIENT
Start: 2025-01-13 | End: 2025-01-10

## 2025-01-10 RX ORDER — THIAMINE HYDROCHLORIDE 100 MG/ML
100 INJECTION, SOLUTION INTRAMUSCULAR; INTRAVENOUS DAILY
Status: DISCONTINUED | OUTPATIENT
Start: 2025-01-10 | End: 2025-01-10

## 2025-01-10 RX ORDER — LORAZEPAM 2 MG/ML
INJECTION INTRAMUSCULAR
Status: DISPENSED
Start: 2025-01-10 | End: 2025-01-10

## 2025-01-10 RX ORDER — MIDAZOLAM HYDROCHLORIDE 1 MG/ML
2 INJECTION, SOLUTION INTRAMUSCULAR; INTRAVENOUS ONCE
Status: DISCONTINUED | OUTPATIENT
Start: 2025-01-10 | End: 2025-01-10

## 2025-01-10 RX ORDER — LEVETIRACETAM 5 MG/ML
500 INJECTION INTRAVASCULAR EVERY 12 HOURS
Status: DISCONTINUED | OUTPATIENT
Start: 2025-01-10 | End: 2025-01-10

## 2025-01-10 RX ORDER — ACETAMINOPHEN 160 MG/5ML
650 SOLUTION ORAL EVERY 6 HOURS PRN
Status: ACTIVE | OUTPATIENT
Start: 2025-01-10

## 2025-01-10 RX ORDER — BUSPIRONE HYDROCHLORIDE 10 MG/1
10 TABLET ORAL
Status: DISPENSED | OUTPATIENT
Start: 2025-01-10

## 2025-01-10 RX ORDER — ACETAMINOPHEN 650 MG/1
650 SUPPOSITORY RECTAL EVERY 6 HOURS PRN
Status: ACTIVE | OUTPATIENT
Start: 2025-01-10

## 2025-01-10 RX ORDER — POLYETHYLENE GLYCOL 3350 17 G/17G
17 POWDER, FOR SOLUTION ORAL DAILY PRN
Status: ACTIVE | OUTPATIENT
Start: 2025-01-10

## 2025-01-10 RX ORDER — ROSUVASTATIN CALCIUM 10 MG/1
20 TABLET, COATED ORAL DAILY
Status: DISPENSED | OUTPATIENT
Start: 2025-01-10

## 2025-01-10 RX ORDER — ALUMINUM HYDROXIDE, MAGNESIUM HYDROXIDE, AND SIMETHICONE 1200; 120; 1200 MG/30ML; MG/30ML; MG/30ML
30 SUSPENSION ORAL EVERY 6 HOURS PRN
Status: ACTIVE | OUTPATIENT
Start: 2025-01-10

## 2025-01-10 RX ORDER — LORAZEPAM 2 MG/ML
2 INJECTION INTRAMUSCULAR ONCE AS NEEDED
Status: ACTIVE | OUTPATIENT
Start: 2025-01-10

## 2025-01-10 RX ORDER — ENOXAPARIN SODIUM 100 MG/ML
40 INJECTION SUBCUTANEOUS EVERY 24 HOURS
Status: DISPENSED | OUTPATIENT
Start: 2025-01-10

## 2025-01-10 RX ORDER — LEVETIRACETAM 500 MG/1
500 TABLET ORAL 2 TIMES DAILY
Status: DISPENSED | OUTPATIENT
Start: 2025-01-10

## 2025-01-10 RX ORDER — MAGNESIUM SULFATE HEPTAHYDRATE 40 MG/ML
2 INJECTION, SOLUTION INTRAVENOUS ONCE
Status: COMPLETED | OUTPATIENT
Start: 2025-01-10 | End: 2025-01-10

## 2025-01-10 RX ORDER — NAPROXEN SODIUM 220 MG/1
324 TABLET, FILM COATED ORAL ONCE
Status: COMPLETED | OUTPATIENT
Start: 2025-01-10 | End: 2025-01-10

## 2025-01-10 RX ORDER — RISPERIDONE 1 MG/1
1 TABLET ORAL NIGHTLY
Status: ON HOLD | COMMUNITY

## 2025-01-10 RX ADMIN — BUSPIRONE HYDROCHLORIDE 10 MG: 10 TABLET ORAL at 18:04

## 2025-01-10 RX ADMIN — LEVETIRACETAM 500 MG: 5 INJECTION INTRAVENOUS at 13:09

## 2025-01-10 RX ADMIN — MAGNESIUM SULFATE HEPTAHYDRATE 2 G: 40 INJECTION, SOLUTION INTRAVENOUS at 18:04

## 2025-01-10 RX ADMIN — RISPERIDONE 1 MG: 1 TABLET, FILM COATED ORAL at 22:56

## 2025-01-10 RX ADMIN — Medication 3 MG: at 22:56

## 2025-01-10 RX ADMIN — Medication 1 TABLET: at 11:27

## 2025-01-10 RX ADMIN — INSULIN LISPRO 6 UNITS: 100 INJECTION, SOLUTION INTRAVENOUS; SUBCUTANEOUS at 18:18

## 2025-01-10 RX ADMIN — ENOXAPARIN SODIUM 40 MG: 40 INJECTION SUBCUTANEOUS at 18:04

## 2025-01-10 RX ADMIN — ASPIRIN 81 MG 324 MG: 81 TABLET ORAL at 15:58

## 2025-01-10 RX ADMIN — CEFTRIAXONE SODIUM 1 G: 1 INJECTION, SOLUTION INTRAVENOUS at 18:04

## 2025-01-10 RX ADMIN — LEVETIRACETAM 500 MG: 500 TABLET, FILM COATED ORAL at 22:56

## 2025-01-10 RX ADMIN — IOHEXOL 100 ML: 350 INJECTION, SOLUTION INTRAVENOUS at 14:52

## 2025-01-10 RX ADMIN — SERTRALINE HYDROCHLORIDE 100 MG: 100 TABLET ORAL at 22:55

## 2025-01-10 RX ADMIN — FOLIC ACID 1 MG: 1 TABLET ORAL at 11:26

## 2025-01-10 RX ADMIN — THIAMINE HYDROCHLORIDE 100 MG: 100 INJECTION, SOLUTION INTRAMUSCULAR; INTRAVENOUS at 11:27

## 2025-01-10 ASSESSMENT — COGNITIVE AND FUNCTIONAL STATUS - GENERAL
TURNING FROM BACK TO SIDE WHILE IN FLAT BAD: A LOT
TOILETING: A LOT
DAILY ACTIVITIY SCORE: 13
PERSONAL GROOMING: A LOT
MOBILITY SCORE: 13
MOVING FROM LYING ON BACK TO SITTING ON SIDE OF FLAT BED WITH BEDRAILS: A LITTLE
DRESSING REGULAR UPPER BODY CLOTHING: A LOT
CLIMB 3 TO 5 STEPS WITH RAILING: A LOT
WALKING IN HOSPITAL ROOM: A LOT
MOVING TO AND FROM BED TO CHAIR: A LOT
HELP NEEDED FOR BATHING: A LOT
DRESSING REGULAR LOWER BODY CLOTHING: A LOT
EATING MEALS: A LITTLE
STANDING UP FROM CHAIR USING ARMS: A LOT

## 2025-01-10 ASSESSMENT — LIFESTYLE VARIABLES
ORIENTATION AND CLOUDING OF SENSORIUM: CANNOT DO SERIAL ADDITIONS OR IS UNCERTAIN ABOUT DATE
PAROXYSMAL SWEATS: NO SWEAT VISIBLE
ANXIETY: NO ANXIETY, AT EASE
AUDITORY DISTURBANCES: NOT PRESENT
VISUAL DISTURBANCES: NOT PRESENT
TREMOR: NO TREMOR
HEADACHE, FULLNESS IN HEAD: MILD
AGITATION: SOMEWHAT MORE THAN NORMAL ACTIVITY
TOTAL SCORE: 4
NAUSEA AND VOMITING: NO NAUSEA AND NO VOMITING

## 2025-01-10 ASSESSMENT — PAIN - FUNCTIONAL ASSESSMENT: PAIN_FUNCTIONAL_ASSESSMENT: 0-10

## 2025-01-10 ASSESSMENT — COLUMBIA-SUICIDE SEVERITY RATING SCALE - C-SSRS
6. HAVE YOU EVER DONE ANYTHING, STARTED TO DO ANYTHING, OR PREPARED TO DO ANYTHING TO END YOUR LIFE?: NO
2. HAVE YOU ACTUALLY HAD ANY THOUGHTS OF KILLING YOURSELF?: NO
1. IN THE PAST MONTH, HAVE YOU WISHED YOU WERE DEAD OR WISHED YOU COULD GO TO SLEEP AND NOT WAKE UP?: NO

## 2025-01-10 ASSESSMENT — PAIN SCALES - GENERAL
PAINLEVEL_OUTOF10: 0 - NO PAIN
PAINLEVEL_OUTOF10: 5 - MODERATE PAIN

## 2025-01-10 NOTE — TELEPHONE ENCOUNTER
Spouse called to let you know he was taken by ambulance this morning due to he had a seizure, they will take him to [facility].

## 2025-01-10 NOTE — ED PROVIDER NOTES
THIS IS MY RESIDENT SUPERVISORY AND SHARED VISIT NOTE:    I personally saw the patient and made/approved the management plan and take responsibility for the patient management.    History: Patient is a 74-year-old male presents today with a chief complaint of fall.  Patient unwitnessed fall heard by his wife, unsure if there is a loss of consciousness, when the wife's patient found him on the floor he was slow to respond not answering questions appropriately, per the patient's wife, he seems to be slightly  He has been he is postictal, patient is endorsing head pain, no any neck or back pain, patient has chest pain shortness of breath, dizziness or blurred vision, fevers or chills.  Does endorse a headache,    Exam: GENERAL APPEARANCE: Awake and alert.     HEENT: Normocephalic, atraumatic. Extraocular muscles are intact. Pupils equal round and reactive to light.  CHEST: Nontender to palpation. Clear to auscultation bilaterally. No rales, rhonchi, or wheezing.   HEART: S1, S2. Regular rate and rhythm. No murmurs, gallops or rubs.  Strong and equal pulses in the extremities.   ABDOMEN: Soft,.  non-tender.  No rebound or guarding, bowel sounds normal x 4 quadrants  NEUROLOGICAL: Awake, alert and oriented x 2      MDM: Patient seen and evaluated at bedside, patient is in no acute distress.  I will order a CBC, CMP, urinalysis, troponin, EKG, lactic acid, COVID, flu, magnesium, BNP, coags, x-ray chest, CT head, CT cervical spine, CT angio chest, pelvis,. Differential diagnosis includes but is not limited to ACS, accidental fall, seizure, sinus arrhythmia, electrolyte abnormality, stroke, aortic dissection, AAA.  Patient does not elevated troponin on his laboratory evaluation, patient's COVID and flu negative, urinalysis showed leukocyte esterase in it as well as 1+ bacteria, patient CBC shows hemoglobin 12.8, hematocrit 39.5, creatinine appreciate any acute findings on his x-ray of his chest, formal report as well as CT  images listed below..  Due to the patient's results, he should be admitted to the general medicine service,    Diagnosis: Fall, elevated troponin, elevated BNP  CT angio chest abdomen pelvis   Final Result   CHEST   1.  6 mm left lung nodule as described.   2. Mild atherosclerotic calcification of the thoracic aorta.   3. Moderate compression deformity of the superior endplate of the T12   vertebral body.        ABDOMEN - PELVIS   1.  Gallbladder calculus.   2. Mild atherosclerosis of the aorta and visceral vessels as   described.   3. Distended urinary bladder. Prostatomegaly.   4. Small hiatal hernia.        MACRO:   1.        Signed by: Leon Miller 1/10/2025 3:38 PM   Dictation workstation:   [id number]      XR chest 1 view   Final Result   No active disease in the chest identified on hypoventilatory exam.        MACRO:   None        Signed by: Darci Stratton 1/10/2025 12:27 PM   Dictation workstation:   [id number]      CT cervical spine wo IV contrast   Final Result   No evidence of an acute fracture or subluxation.  Degenerative   changes.        MACRO:   None.        Signed by: Frank Diaz 1/10/2025 11:39 AM   Dictation workstation:   [id number]      CT head wo IV contrast   Final Result   No evidence of an acute intracranial process.        MACRO:   None.        Signed by: Frank Diaz 1/10/2025 11:37 AM   Dictation workstation:   [id number]      Point of Care Ultrasound    (Results Pending)     Results for orders placed or performed during the hospital encounter of 01/10/25   CBC and Auto Differential    Collection Time: 01/10/25 11:14 AM   Result Value Ref Range    WBC 10.9 4.4 - 11.3 x10*3/uL    nRBC 0.0 0.0 - 0.0 /100 WBCs    RBC 4.06 (L) 4.50 - 5.90 x10*6/uL    Hemoglobin 12.8 (L) 13.5 - 17.5 g/dL    Hematocrit 39.5 (L) 41.0 - 52.0 %    MCV 97 80 - 100 fL    MCH 31.5 26.0 - 34.0 pg    MCHC 32.4 32.0 - 36.0 g/dL    RDW 13.2 11.5 - 14.5 %    Platelets 217 150 - 450 x10*3/uL    Neutrophils % 86.1 40.0 -  80.0 %    Immature Granulocytes %, Automated 0.6 0.0 - 0.9 %    Lymphocytes % 7.7 13.0 - 44.0 %    Monocytes % 5.1 2.0 - 10.0 %    Eosinophils % 0.0 0.0 - 6.0 %    Basophils % 0.5 0.0 - 2.0 %    Neutrophils Absolute 9.35 (H) 1.60 - 5.50 x10*3/uL    Immature Granulocytes Absolute, Automated 0.06 0.00 - 0.50 x10*3/uL    Lymphocytes Absolute 0.84 0.80 - 3.00 x10*3/uL    Monocytes Absolute 0.55 0.05 - 0.80 x10*3/uL    Eosinophils Absolute 0.00 0.00 - 0.40 x10*3/uL    Basophils Absolute 0.05 0.00 - 0.10 x10*3/uL   Comprehensive Metabolic Panel    Collection Time: 01/10/25 11:14 AM   Result Value Ref Range    Glucose 345 (H) 74 - 99 mg/dL    Sodium 134 (L) 136 - 145 mmol/L    Potassium 4.4 3.5 - 5.3 mmol/L    Chloride 101 98 - 107 mmol/L    Bicarbonate 19 (L) 21 - 32 mmol/L    Anion Gap 18 10 - 20 mmol/L    Urea Nitrogen 25 (H) 6 - 23 mg/dL    Creatinine 1.11 0.50 - 1.30 mg/dL    eGFR 69 >60 mL/min/1.73m*2    Calcium 9.5 8.6 - 10.3 mg/dL    Albumin 4.3 3.4 - 5.0 g/dL    Alkaline Phosphatase 75 33 - 136 U/L    Total Protein 7.3 6.4 - 8.2 g/dL    AST 18 9 - 39 U/L    Bilirubin, Total 0.7 0.0 - 1.2 mg/dL    ALT 18 10 - 52 U/L   Magnesium    Collection Time: 01/10/25 11:14 AM   Result Value Ref Range    Magnesium 1.69 1.60 - 2.40 mg/dL   Coagulation Screen    Collection Time: 01/10/25 11:14 AM   Result Value Ref Range    Protime 12.8 9.8 - 12.8 seconds    INR 1.1 0.9 - 1.1    aPTT 29 27 - 38 seconds   Acute Toxicology Panel, Blood    Collection Time: 01/10/25 11:14 AM   Result Value Ref Range    Acetaminophen <10.0 10.0 - 30.0 ug/mL    Salicylate  <3 4 - 20 mg/dL    Alcohol <10 <=10 mg/dL   Blood Gas Venous Full Panel    Collection Time: 01/10/25 11:14 AM   Result Value Ref Range    POCT pH, Venous 7.35 7.33 - 7.43 pH    POCT pCO2, Venous 43 41 - 51 mm Hg    POCT pO2, Venous 35 35 - 45 mm Hg    POCT SO2, Venous 60 45 - 75 %    POCT Oxy Hemoglobin, Venous 58.0 45.0 - 75.0 %    POCT Hematocrit Calculated, Venous 38.0 (L) 41.0 -  52.0 %    POCT Sodium, Venous 132 (L) 136 - 145 mmol/L    POCT Potassium, Venous 4.5 3.5 - 5.3 mmol/L    POCT Chloride, Venous 102 98 - 107 mmol/L    POCT Ionized Calicum, Venous 1.25 1.10 - 1.33 mmol/L    POCT Glucose, Venous 378 (H) 74 - 99 mg/dL    POCT Lactate, Venous 2.6 (H) 0.4 - 2.0 mmol/L    POCT Base Excess, Venous -2.0 -2.0 - 3.0 mmol/L    POCT HCO3 Calculated, Venous 23.7 22.0 - 26.0 mmol/L    POCT Hemoglobin, Venous 12.6 (L) 13.5 - 17.5 g/dL    POCT Anion Gap, Venous 11.0 10.0 - 25.0 mmol/L    Patient Temperature 37.0 degrees Celsius    FiO2 21 %   B-Type Natriuretic Peptide    Collection Time: 01/10/25 11:14 AM   Result Value Ref Range     (H) 0 - 99 pg/mL   Troponin I, High Sensitivity, Initial    Collection Time: 01/10/25 11:14 AM   Result Value Ref Range    Troponin I, High Sensitivity 148 (HH) 0 - 20 ng/L   Urinalysis with Reflex Culture and Microscopic    Collection Time: 01/10/25 11:41 AM   Result Value Ref Range    Color, Urine Light-Yellow Light-Yellow, Yellow, Dark-Yellow    Appearance, Urine Clear Clear    Specific Gravity, Urine 1.015 1.005 - 1.035    pH, Urine 6.0 5.0, 5.5, 6.0, 6.5, 7.0, 7.5, 8.0    Protein, Urine NEGATIVE NEGATIVE, 10 (TRACE), 20 (TRACE) mg/dL    Glucose, Urine OVER (4+) (A) Normal mg/dL    Blood, Urine NEGATIVE NEGATIVE    Ketones, Urine TRACE (A) NEGATIVE mg/dL    Bilirubin, Urine NEGATIVE NEGATIVE    Urobilinogen, Urine Normal Normal mg/dL    Nitrite, Urine NEGATIVE NEGATIVE    Leukocyte Esterase, Urine 75 Margaret/uL (A) NEGATIVE   Extra Urine Gray Tube    Collection Time: 01/10/25 11:41 AM   Result Value Ref Range    Extra Tube Hold for add-ons.    Microscopic Only, Urine    Collection Time: 01/10/25 11:41 AM   Result Value Ref Range    WBC, Urine 1-5 1-5, NONE /HPF    RBC, Urine 1-2 NONE, 1-2, 3-5 /HPF    Squamous Epithelial Cells, Urine 1-9 (SPARSE) Reference range not established. /HPF    Bacteria, Urine 1+ (A) NONE SEEN /HPF   Troponin, High Sensitivity, 1 Hour     Collection Time: 01/10/25  1:03 PM   Result Value Ref Range    Troponin I, High Sensitivity 159 (HH) 0 - 20 ng/L   Sars-CoV-2 PCR    Collection Time: 01/10/25  1:03 PM   Result Value Ref Range    Coronavirus 2019, PCR Not Detected Not Detected   Influenza A, and B PCR    Collection Time: 01/10/25  1:03 PM   Result Value Ref Range    Flu A Result Not Detected Not Detected    Flu B Result Not Detected Not Detected   Levetiracetam    Collection Time: 01/10/25  1:03 PM   Result Value Ref Range    Keppra 4 (L) 10 - 40 ug/mL   Troponin I, High Sensitivity    Collection Time: 01/10/25  3:30 PM   Result Value Ref Range    Troponin I, High Sensitivity 180 (HH) 0 - 20 ng/L   POCT GLUCOSE    Collection Time: 01/10/25  6:10 PM   Result Value Ref Range    POCT Glucose 245 (H) 74 - 99 mg/dL   POCT GLUCOSE    Collection Time: 01/10/25 10:31 PM   Result Value Ref Range    POCT Glucose 95 74 - 99 mg/dL   CBC    Collection Time: 01/11/25  5:13 AM   Result Value Ref Range    WBC 6.7 4.4 - 11.3 x10*3/uL    nRBC 0.0 0.0 - 0.0 /100 WBCs    RBC 3.81 (L) 4.50 - 5.90 x10*6/uL    Hemoglobin 11.8 (L) 13.5 - 17.5 g/dL    Hematocrit 36.9 (L) 41.0 - 52.0 %    MCV 97 80 - 100 fL    MCH 31.0 26.0 - 34.0 pg    MCHC 32.0 32.0 - 36.0 g/dL    RDW 13.5 11.5 - 14.5 %    Platelets 200 150 - 450 x10*3/uL   Basic metabolic panel    Collection Time: 01/11/25  5:13 AM   Result Value Ref Range    Glucose 118 (H) 74 - 99 mg/dL    Sodium 138 136 - 145 mmol/L    Potassium 3.8 3.5 - 5.3 mmol/L    Chloride 106 98 - 107 mmol/L    Bicarbonate 24 21 - 32 mmol/L    Anion Gap 12 10 - 20 mmol/L    Urea Nitrogen 26 (H) 6 - 23 mg/dL    Creatinine 0.85 0.50 - 1.30 mg/dL    eGFR 90 >60 mL/min/1.73m*2    Calcium 9.2 8.6 - 10.3 mg/dL   Lactate    Collection Time: 01/11/25  5:13 AM   Result Value Ref Range    Lactate 0.7 0.4 - 2.0 mmol/L   Troponin I, High Sensitivity    Collection Time: 01/11/25  5:13 AM   Result Value Ref Range    Troponin I, High Sensitivity 120 (HH) 0 -  20 ng/L   POCT GLUCOSE    Collection Time: 01/11/25  6:08 AM   Result Value Ref Range    POCT Glucose 117 (H) 74 - 99 mg/dL   POCT GLUCOSE    Collection Time: 01/11/25 12:28 PM   Result Value Ref Range    POCT Glucose 143 (H) 74 - 99 mg/dL         Please see resident note for further details    Sections of this report were created using voice-to-text technology and may contain errors in translation    Stanton Poon DO  Emergency Medicine     Stanton Poon DO  01/11/25 1422

## 2025-01-10 NOTE — H&P
"Hospital Medicine History & Physical    Subjective:  Albert Abbott is a 74 y.o. male with T2DM, seizures, HTN, HLD, GERD, BPH, MDD, MARILYN, PTSD, CVA, COPD, dementia, who presents with an unwitnessed fall. Pt unable to provide any hx as he does not answer any questions. Wife called and provided hx. She heard a noise and found pt on the ground in the bathroom. He did not endorse any injures to her but was more confused than baseline, wife states he did not know who she was. Wife states pt normally spends about 20 hours a day in bed and just gets up to eat, he doesn't take his meds as prescribed and she is not sure if he has been taking his DM meds. He doesn't follow with cardiology. Work up in ED with elevated troponin, no significant EKG changes, UA mildly positive, hyperglycemia.       A 10 point ROS was unable to be completed as above.     PMHx: As above  PSHx: External ear surgery  Social Hx: former smoker, denies alcohol use, denies illicit drug use  Family Hx: lung ca, leukemia     Objective:    /64   Pulse 69   Temp 36.8 °C (98.2 °F) (Temporal)   Resp 18   Ht 1.803 m (5' 11\")   Wt 107 kg (235 lb)   SpO2 100%   BMI 32.78 kg/m²     Physical Exam:  General: Awake, no distress, uncooperative  HEENT: NC/AT, clear sclera, oral mucosa dry  NECK: Supple  Cardiovascular: RRR, no murmurs. S1/S2  Respiratory: CTAB, no RRW or crackles. No distress  Abdomen: Soft, ND, non-tender. BS+  Extremities: No peripheral edema  Neurological: Moving all extremities, confused, not answering questions  Skin: Warm and dry, no rashes  Psych: unable to assess    I personally reviewed all imaging, labs and notes/ documentation.     Assessment & Plan:    Unwitnessed fall vs syncope  Elevated troponin  Hyperglycemia, T2DM  Possible UTI  Acute metabolic encephalopathy    Seizures  HTN, HLD  GERD  BPH  MDD, MARILYN, PTSD  CVA  COPD  Dementia    Monitor on tele, continue to trend trops, no concerning EKG changes, check lipid panel/ " A1c, asa/ statin, consult cards   ISS, monitor BG, no acidosis on VBG  Cover with rocephin pending urine cx  PT, OT, fall precautions  Resume home meds, holding glyburide, metformin, Actos    DVT Prophylaxis: Lovenox, SCDs  Code Status: DNR and No Intubation confirmed with wife Gabriela  Disposition: Tele      Sandy Macias DO  Hospitalist

## 2025-01-10 NOTE — PROGRESS NOTES
"Pharmacy Medication History Review    Albert Abbott \"Taras\" is a 74 y.o. male admitted for No Principal Problem: There is no principal problem currently on the Problem List. Please update the Problem List and refresh.. Pharmacy reviewed the patient's [id number] medications and allergies for accuracy.    The list below reflectives the updated PTA list. Please review each medication in order reconciliation for additional clarification and justification.  Prior to Admission medications    Medication Sig Start Date End Date Taking? Authorizing Provider   albuterol 90 mcg/actuation inhaler Inhale 2 puffs every 4 hours if needed for wheezing or shortness of breath (cough).  Patient not taking: Reported on 11/25/2024 7/22/24   Historical Provider, MD   aspirin 81 mg EC tablet Take 1 tablet (81 mg) by mouth once daily.  Patient not taking: Reported on 1/10/2025    Historical Provider, MD   busPIRone (Buspar) 10 mg tablet Take 1 tablet (10 mg) by mouth 2 times daily (morning and late afternoon).  Patient not taking: Reported on 1/10/2025 7/8/24   Historical Provider, MD   carboxymethylcellulose (Refresh Celluvisc) 1 % ophthalmic solution Administer 1 drop into both eyes once daily at bedtime. 2/7/24   Historical Provider, MD   cetirizine (Wal-Zyr, cetirizine,) 10 mg tablet Take 1 tablet (10 mg) by mouth once daily. 11/29/24   Historical Provider, MD   cholecalciferol (Vitamin D3) 50 MCG (2000 UT) tablet Take 2 tablets (4,000 Units) by mouth once daily.  Patient not taking: Reported on 1/10/2025    Historical Provider, MD   folic acid (Folvite) 1 mg tablet Take 1 tablet (1 mg) by mouth once daily.  Patient not taking: Reported on 1/10/2025 10/31/24   Tari Wu MD   glyBURIDE (Diabeta) 5 mg tablet Take 2 tablets (10 mg) by mouth once daily in the morning. Take before meals.    Historical Provider, MD   levETIRAcetam (Keppra) 500 mg tablet Take 1 tablet (500 mg) by mouth 2 times a day.    Historical Provider, MD "   lisinopril 5 mg tablet Take 0.5 tablets (2.5 mg) by mouth once daily. 10/27/23   Historical Provider, MD   metFORMIN (Glucophage) 1,000 mg tablet Take 1 tablet (1,000 mg) by mouth 2 times daily (morning and late afternoon).    Historical Provider, MD   pantoprazole (ProtoNix) 40 mg EC tablet Take 1 tablet (40 mg) by mouth once daily. Do not crush, chew, or split.  Patient not taking: Reported on 1/10/2025 10/30/24   Tari Wu MD   pioglitazone (Actos) 45 mg tablet Take 1 tablet (45 mg) by mouth once daily with breakfast. 9/5/24   Historical Provider, MD   polyethylene glycol (Glycolax, Miralax) 17 gram packet Take 17 g by mouth once daily as needed (constipation). 10/30/24   Tari Wu MD   risperiDONE (RisperDAL) 1 mg tablet Take 1 tablet (1 mg) by mouth once daily at bedtime.    Historical Provider, MD   rosuvastatin (Crestor) 20 mg tablet Take 1 tablet (20 mg) by mouth once daily.    Historical Provider, MD   sennosides (Senokot) 8.6 mg tablet Take 2 tablets (17.2 mg) by mouth as needed at bedtime for constipation.  Patient not taking: Reported on 11/25/2024 10/30/24   Tari Wu MD   sertraline (Zoloft) 100 mg tablet Take 1 tablet (100 mg) by mouth 2 times a day. 10/30/24   Tari Wu MD   tamsulosin (Flomax) 0.4 mg 24 hr capsule Take 1 capsule (0.4 mg) by mouth once daily at bedtime.  Patient not taking: Reported on 1/10/2025 10/30/24   Tari Wu MD   thiamine (Vitamin B-1) 100 mg tablet Take 1 tablet (100 mg) by mouth once daily. Do not fill before October 31, 2024.  Patient not taking: Reported on 1/10/2025 10/31/24   aTri Wu MD        The list below reflectives the updated allergy list. Please review each documented allergy for additional clarification and justification.  Allergies  Reviewed by Xin Ramon RN on 1/10/2025        Severity Reactions Comments    Metformin Medium Diarrhea With higher dose    Atorvastatin Low Myalgia Joint Pain            Below are  additional concerns with the patient's PTA list.    Patient poor historian. Patients wife contacted at home for supplemental history. Patient's wife reports patient spends most of his days in bed, about 20 hours daily. Patient refuses help with medications at home, and after [facility] there is concern for noncompliance. [facility]25.    Barbara Sams

## 2025-01-10 NOTE — ED PROVIDER NOTES
History of Present Illness     History provided by: Patient  Limitations to History: None  External Records Reviewed with Brief Summary:  discharge summary on 10/31/2024 patient presented for altered mental status     HPI:  Albert Abbott is a 74 y.o. male with history of T2DM c/b peripheral neuropathy, HLD, CVA, MARILYN, MDD, seizures, and PTSD who presents emergency department after a fall unwitnessed at home.  Per EMS, wife noted that she heard a sound, uncommon for her.  No reported seizure activity.  However does have prior history of seizures, unclear if he is on his medication.  He is a poor historian on arrival, noting that he has to urinate, but when asked if he has any complaints, he is holding his head, does not express any areas of pain or concern.  He is tremulous  while in the emergency department.     Per prior discharge summary, patient presented to the emergency department with encephalopathy I/s/o known dementia and MDD with hypoactive delirium.  He was eventually discharged to SNF on 10/31/2024.  He has since returned back home with his wife.    Physical Exam   Triage vitals:  T 36.8 °C (98.2 °F)  HR 87  /86  RR 18  O2 96 % None (Room air)    GEN:  A&Ox self and place, no acute distress, appears ubcomfortable. Conversational and appropriate.    HEENT: Normocephalic, atraumatic. Conjunctiva pink with no redness or exudates. Hard of hearing. Dry mucous membranes.  CARDIO: Normal rate and regular rhythm. Normal S1, S2  without murmurs, rubs, or gallops.   PULM: Clear to auscultation bilaterally. No rales, rhonchi, or wheezes. No accessory muscle use or stridor.  GI: Soft, non-tender, non-distended. No rebound tenderness or guarding.   SKIN: Warm and dry, no rashes, lesions, petechiae, or purpura.  MSK: ROM intact in all 4 extremities without contractures or pain. No peripheral edema, contusions, or wounds.  2+ pulses in the bilateral lower extremities.  NEURO: No focal findings identified. No  confusion or gross mental status changes.  Does not follow commands.  Intermittently answers questions.  Moving all extremities.  Slight resting tremor.    Medical Decision Making & ED Course   Medical Decision Makin y.o. male with history of T2DM c/b peripheral neuropathy, HLD, CVA, MARILYN, MDD, and seizures presented to the emergency department with fall and subsequent seizure and was found to have an elevated troponin with concern of potential cardiac event.  While patient was unable to provide a complete history of present illness, likely CT head, and CT cervical spine did not show any acute findings.  Keppra level was noted to be low, will give home dose in emergency department with significant concern for medication noncompliance with patient seizures.  Although the patient was without any seizures while here. BNP was also noted to be elevated, concerning for additional cardiac pathology. Given his elevated troponin, changes on his EKG showing bigeminy with persistent prolonged CT interval which he does have a history of in the setting of unwitnessed fall, CT angio chest, abdomen, pelvis was ordered to rule out abdominal pathology  which did not show any acute pathology.  Overall, I do believe patient's falls likely related  seizure in the setting of medication noncompliance.  However given elevated troponins and elevated BNP, I cannot rule out cardiac pathology, therefore we will plan for admission for echo and additional cardiac workup.  Remained hemodynamically stable in the ED EKG persistent and showing bigeminy with prolonged CT interval.    ----      Differential diagnoses considered include but are not limited to: Cardiac event, seizure, mechanical fall, aortic pathology UTI, syncope     Social Determinants of Health which Significantly Impact Care: None identified     EKG Independent Interpretation: Please see ED course for interpretation of EKG    Independent Result Review and Interpretation:  Please see ED course and MDM for interpretation of results and interpretation    Chronic conditions affecting the patient's care:Please see ED course and MDM for interpretation of chronic conditions    The patient was discussed with the following consultants/services: None    Care Considerations: As documented in ED course and MDM.    ED Course:  ED Course as of 01/10/25 1835   Fri Wander 10, 2025   1035 Prehospital EKG interpreted by myself independently, EKG shows A-fib with a rate of 86 bpm, parable 286, QRS 88, , QTc 437, patient has no ST elevation or depression, negative for acute MI. [BEN]   1048 EKG interpreted by myself independently, EKG shows a sinus rhythm with a prolonged PA interval, rate of 88 bpm, PA interval 369, QRS 90, , QTc 457, patient has no ST elevation or depression, negative for acute MI. [BEN]   1129 CT head wo IV contrast  CT head without any acute finding. [AD]   1129 ECG 12 lead  Bigeminy with prolonged PA interval and nonspecific ST segment changes, new from prior EKG performed on 2/21/2024.  EKG from 2/2024 noted first-degree AV block with NSR. [AD]   1129 CBC and Auto Differential(!)  Overall unremarkable, slight neutrophilia, could be in correlation with patient's seizure. [AD]   1143 Blood Gas Venous Full Panel(!)  VBG noting elevated lactate, elevated glucose, anion gap within normal limits, mild hyponatremia.  Will wait for formal CMP [AD]   1143 Low suspicion for infection at this time, however will keep on differential and order chest x-ray and UA [AD]   1149 CT cervical spine wo IV contrast  No acute findings noted. [AD]   1213 XR chest 1 view  Questionable haziness in the left lower lobe concerning for potential pneumonia, will also order COVID-19 and influenza swabs. [AD]   1218 Alcohol: <10  No prior history of alcohol use disorder, low or low suspicion for withdrawal seizure. [AD]   1219 BNP(!): 581  New elevated BNP in the setting of dizziness.  10 months ago was  <100 [AD]   1224 Troponin I, High Sensitivity(!!): 148  Notably elevated troponin as well, will repeat [AD]   1227 Due to elevated troponin, questionably wide mediastinum on chest x-ray, and unwitnessed fall, will perform CT angio chest abdomen pelvis to rule out aortic pathology.  Currently hemodynamically stable. [AD]   1359 Troponin I, High Sensitivity(!!): 159  2nd [AD]   1512 CT angio chest abdomen pelvis  CT angio without any acute findings to suggest dissection or aneurysm.  Given elevated troponins, will plan for admission for further cardiac workup.  EKG with new bigeminy 8, however with persistent prolonged ID interval which was seen on previous EKGs.  [AD]   1827 Repeat EKG noted sinus rhythm but with less prominent bigeminy.  Continued prolonged ID interval [AD]      ED Course User Index  [AD] Caroline Shannon DO  [BEN] Stanton Poon DO         Diagnoses as of 01/10/25 1835   Fall, initial encounter   Elevated troponin   Elevated brain natriuretic peptide (BNP) level       Disposition   As a result of their workup, the patient will require admission to the hospital.  The patient was informed of his diagnosis.  The patient was given the opportunity to ask questions and I answered them. The patient agreed to be admitted to the hospital.    Procedures   Procedures    Patient seen and discussed with ED attending physician    Caroline Shannon DO  Emergency Medicine     Caroline Shannon DO  Resident  01/10/25 1835

## 2025-01-10 NOTE — TELEPHONE ENCOUNTER
Noted-looks like he is on seizure medication so this does not appear new   Cosentyx Counseling:  I discussed with the patient the risks of Cosentyx including but not limited to worsening of Crohn's disease, immunosuppression, allergic reactions and infections.  The patient understands that monitoring is required including a PPD at baseline and must alert us or the primary physician if symptoms of infection or other concerning signs are noted.

## 2025-01-11 LAB
ANION GAP SERPL CALC-SCNC: 12 MMOL/L (ref 10–20)
BUN SERPL-MCNC: 26 MG/DL (ref 6–23)
CALCIUM SERPL-MCNC: 9.2 MG/DL (ref 8.6–10.3)
CARDIAC TROPONIN I PNL SERPL HS: 120 NG/L (ref 0–20)
CHLORIDE SERPL-SCNC: 106 MMOL/L (ref 98–107)
CO2 SERPL-SCNC: 24 MMOL/L (ref 21–32)
CREAT SERPL-MCNC: 0.85 MG/DL (ref 0.5–1.3)
EGFRCR SERPLBLD CKD-EPI 2021: 90 ML/MIN/1.73M*2
ERYTHROCYTE [DISTWIDTH] IN BLOOD BY AUTOMATED COUNT: 13.5 % (ref 11.5–14.5)
GLUCOSE BLD MANUAL STRIP-MCNC: 117 MG/DL (ref 74–99)
GLUCOSE BLD MANUAL STRIP-MCNC: 143 MG/DL (ref 74–99)
GLUCOSE BLD MANUAL STRIP-MCNC: 217 MG/DL (ref 74–99)
GLUCOSE BLD MANUAL STRIP-MCNC: 226 MG/DL (ref 74–99)
GLUCOSE SERPL-MCNC: 118 MG/DL (ref 74–99)
HCT VFR BLD AUTO: 36.9 % (ref 41–52)
HGB BLD-MCNC: 11.8 G/DL (ref 13.5–17.5)
LACTATE SERPL-SCNC: 0.7 MMOL/L (ref 0.4–2)
MCH RBC QN AUTO: 31 PG (ref 26–34)
MCHC RBC AUTO-ENTMCNC: 32 G/DL (ref 32–36)
MCV RBC AUTO: 97 FL (ref 80–100)
NRBC BLD-RTO: 0 /100 WBCS (ref 0–0)
PLATELET # BLD AUTO: 200 X10*3/UL (ref 150–450)
POTASSIUM SERPL-SCNC: 3.8 MMOL/L (ref 3.5–5.3)
RBC # BLD AUTO: 3.81 X10*6/UL (ref 4.5–5.9)
SODIUM SERPL-SCNC: 138 MMOL/L (ref 136–145)
WBC # BLD AUTO: 6.7 X10*3/UL (ref 4.4–11.3)

## 2025-01-11 PROCEDURE — 1200000002 HC GENERAL ROOM WITH TELEMETRY DAILY

## 2025-01-11 PROCEDURE — 2500000005 HC RX 250 GENERAL PHARMACY W/O HCPCS: Performed by: INTERNAL MEDICINE

## 2025-01-11 PROCEDURE — 36415 COLL VENOUS BLD VENIPUNCTURE: CPT | Performed by: INTERNAL MEDICINE

## 2025-01-11 PROCEDURE — 82947 ASSAY GLUCOSE BLOOD QUANT: CPT

## 2025-01-11 PROCEDURE — 2500000004 HC RX 250 GENERAL PHARMACY W/ HCPCS (ALT 636 FOR OP/ED): Performed by: INTERNAL MEDICINE

## 2025-01-11 PROCEDURE — 2500000002 HC RX 250 W HCPCS SELF ADMINISTERED DRUGS (ALT 637 FOR MEDICARE OP, ALT 636 FOR OP/ED): Performed by: INTERNAL MEDICINE

## 2025-01-11 PROCEDURE — 80048 BASIC METABOLIC PNL TOTAL CA: CPT | Performed by: INTERNAL MEDICINE

## 2025-01-11 PROCEDURE — 85027 COMPLETE CBC AUTOMATED: CPT | Performed by: INTERNAL MEDICINE

## 2025-01-11 PROCEDURE — 99222 1ST HOSP IP/OBS MODERATE 55: CPT | Performed by: STUDENT IN AN ORGANIZED HEALTH CARE EDUCATION/TRAINING PROGRAM

## 2025-01-11 PROCEDURE — 2500000001 HC RX 250 WO HCPCS SELF ADMINISTERED DRUGS (ALT 637 FOR MEDICARE OP): Performed by: INTERNAL MEDICINE

## 2025-01-11 PROCEDURE — 84484 ASSAY OF TROPONIN QUANT: CPT | Performed by: INTERNAL MEDICINE

## 2025-01-11 PROCEDURE — 83605 ASSAY OF LACTIC ACID: CPT | Performed by: INTERNAL MEDICINE

## 2025-01-11 PROCEDURE — 99232 SBSQ HOSP IP/OBS MODERATE 35: CPT | Performed by: INTERNAL MEDICINE

## 2025-01-11 RX ADMIN — POLYVINYL ALCOHOL, POVIDONE 1 DROP: 14; 6 SOLUTION/ DROPS OPHTHALMIC at 20:57

## 2025-01-11 RX ADMIN — RISPERIDONE 1 MG: 1 TABLET, FILM COATED ORAL at 20:48

## 2025-01-11 RX ADMIN — ENOXAPARIN SODIUM 40 MG: 40 INJECTION SUBCUTANEOUS at 17:40

## 2025-01-11 RX ADMIN — BUSPIRONE HYDROCHLORIDE 10 MG: 10 TABLET ORAL at 09:35

## 2025-01-11 RX ADMIN — INSULIN LISPRO 6 UNITS: 100 INJECTION, SOLUTION INTRAVENOUS; SUBCUTANEOUS at 17:41

## 2025-01-11 RX ADMIN — Medication 3 MG: at 20:48

## 2025-01-11 RX ADMIN — SERTRALINE HYDROCHLORIDE 100 MG: 100 TABLET ORAL at 09:36

## 2025-01-11 RX ADMIN — ASPIRIN 81 MG: 81 TABLET, COATED ORAL at 09:36

## 2025-01-11 RX ADMIN — INSULIN LISPRO 6 UNITS: 100 INJECTION, SOLUTION INTRAVENOUS; SUBCUTANEOUS at 20:47

## 2025-01-11 RX ADMIN — LEVETIRACETAM 500 MG: 500 TABLET, FILM COATED ORAL at 09:36

## 2025-01-11 RX ADMIN — ROSUVASTATIN CALCIUM 20 MG: 10 TABLET, FILM COATED ORAL at 09:35

## 2025-01-11 RX ADMIN — LEVETIRACETAM 500 MG: 500 TABLET, FILM COATED ORAL at 20:48

## 2025-01-11 RX ADMIN — BUSPIRONE HYDROCHLORIDE 10 MG: 10 TABLET ORAL at 17:40

## 2025-01-11 RX ADMIN — SERTRALINE HYDROCHLORIDE 100 MG: 100 TABLET ORAL at 20:48

## 2025-01-11 RX ADMIN — CEFTRIAXONE SODIUM 1 G: 1 INJECTION, SOLUTION INTRAVENOUS at 18:06

## 2025-01-11 SDOH — ECONOMIC STABILITY: INCOME INSECURITY: IN THE PAST 12 MONTHS HAS THE ELECTRIC, GAS, OIL, OR WATER COMPANY THREATENED TO SHUT OFF SERVICES IN YOUR HOME?: NO

## 2025-01-11 SDOH — SOCIAL STABILITY: SOCIAL INSECURITY: WITHIN THE LAST YEAR, HAVE YOU BEEN AFRAID OF YOUR PARTNER OR EX-PARTNER?: PATIENT UNABLE TO ANSWER

## 2025-01-11 SDOH — ECONOMIC STABILITY: FOOD INSECURITY: WITHIN THE PAST 12 MONTHS, THE FOOD YOU BOUGHT JUST DIDN'T LAST AND YOU DIDN'T HAVE MONEY TO GET MORE.: NEVER TRUE

## 2025-01-11 SDOH — ECONOMIC STABILITY: FOOD INSECURITY: WITHIN THE PAST 12 MONTHS, YOU WORRIED THAT YOUR FOOD WOULD RUN OUT BEFORE YOU GOT THE MONEY TO BUY MORE.: NEVER TRUE

## 2025-01-11 SDOH — SOCIAL STABILITY: SOCIAL INSECURITY: WERE YOU ABLE TO COMPLETE ALL THE BEHAVIORAL HEALTH SCREENINGS?: NO

## 2025-01-11 SDOH — SOCIAL STABILITY: SOCIAL INSECURITY
WITHIN THE LAST YEAR, HAVE YOU BEEN RAPED OR FORCED TO HAVE ANY KIND OF SEXUAL ACTIVITY BY YOUR PARTNER OR EX-PARTNER?: PATIENT UNABLE TO ANSWER

## 2025-01-11 SDOH — SOCIAL STABILITY: SOCIAL INSECURITY
WITHIN THE LAST YEAR, HAVE YOU BEEN KICKED, HIT, SLAPPED, OR OTHERWISE PHYSICALLY HURT BY YOUR PARTNER OR EX-PARTNER?: PATIENT UNABLE TO ANSWER

## 2025-01-11 SDOH — SOCIAL STABILITY: SOCIAL INSECURITY
WITHIN THE LAST YEAR, HAVE YOU BEEN HUMILIATED OR EMOTIONALLY ABUSED IN OTHER WAYS BY YOUR PARTNER OR EX-PARTNER?: PATIENT UNABLE TO ANSWER

## 2025-01-11 ASSESSMENT — PAIN SCALES - GENERAL
PAINLEVEL_OUTOF10: 0 - NO PAIN
PAINLEVEL_OUTOF10: 0 - NO PAIN

## 2025-01-11 ASSESSMENT — ACTIVITIES OF DAILY LIVING (ADL)
ADEQUATE_TO_COMPLETE_ADL: UNABLE TO ASSESS
HEARING - LEFT EAR: FUNCTIONAL
ASSISTIVE_DEVICE: WALKER
WALKS IN HOME: NEEDS ASSISTANCE
BATHING: NEEDS ASSISTANCE
HEARING - RIGHT EAR: FUNCTIONAL
FEEDING YOURSELF: NEEDS ASSISTANCE
LACK_OF_TRANSPORTATION: NO
PATIENT'S MEMORY ADEQUATE TO SAFELY COMPLETE DAILY ACTIVITIES?: NO
TOILETING: NEEDS ASSISTANCE
JUDGMENT_ADEQUATE_SAFELY_COMPLETE_DAILY_ACTIVITIES: NO
DRESSING YOURSELF: NEEDS ASSISTANCE
GROOMING: NEEDS ASSISTANCE

## 2025-01-11 ASSESSMENT — COGNITIVE AND FUNCTIONAL STATUS - GENERAL
DRESSING REGULAR LOWER BODY CLOTHING: A LOT
PERSONAL GROOMING: A LITTLE
WALKING IN HOSPITAL ROOM: TOTAL
HELP NEEDED FOR BATHING: A LOT
DRESSING REGULAR UPPER BODY CLOTHING: A LOT
EATING MEALS: A LITTLE
STANDING UP FROM CHAIR USING ARMS: A LOT
DAILY ACTIVITIY SCORE: 14
TURNING FROM BACK TO SIDE WHILE IN FLAT BAD: A LITTLE
TOILETING: A LOT
MOVING FROM LYING ON BACK TO SITTING ON SIDE OF FLAT BED WITH BEDRAILS: A LITTLE
PATIENT BASELINE BEDBOUND: NO
MOBILITY SCORE: 12
CLIMB 3 TO 5 STEPS WITH RAILING: TOTAL
MOVING TO AND FROM BED TO CHAIR: A LOT

## 2025-01-11 NOTE — CARE PLAN
The patient's goals for the shift include  safety    The clinical goals for the shift include  participate in care, safety, free from falls/injury

## 2025-01-11 NOTE — PROGRESS NOTES
"Hospital Medicine Progress Note    Subjective:  Albert Abbott is a 74 y.o. male, who is hospital day 1.     Patient somnolent this am, did wake up later in the am to take his meds.     Objective:    BP 93/51   Pulse 68   Temp 36.2 °C (97.2 °F)   Resp 16   Ht 1.803 m (5' 11\")   Wt 104 kg (229 lb 4.5 oz)   SpO2 98%   BMI 31.98 kg/m²     Physical Exam:  General: Somnolent, no distress  HEENT: NC/AT, clear sclera, oral mucosa dry  NECK: Supple  Cardiovascular: RRR, no murmurs. S1/S2  Respiratory: CTAB, no RRW or crackles. No distress  Abdomen: Soft, ND, non-tender. BS+  Extremities: No peripheral edema  Neurological: Moving all extremities  Skin: Warm and dry, no rashes  Psych: unable to assess    I personally reviewed all imaging, labs and notes/ documentation.     Assessment & Plan:    Unwitnessed fall vs syncope  Elevated troponin, non-MI  Hyperglycemia, T2DM  Possible UTI  Acute metabolic encephalopathy    Seizures  HTN, HLD  GERD  BPH  MDD, MARILYN, PTSD  CVA  COPD  Dementia    Monitor on tele, trop peaked at 180, no concerning EKG changes, check lipid panel/ A1c, asa/ statin, consult cards   ISS, monitor BG, no acidosis on VBG  Rocephin day 2 pending urine cx  PT, OT, fall precautions  Cont home meds, holding glyburide, metformin, Actos    DVT Prophylaxis: Lovenox, SCDs   Code Status: DNR and No Intubation  confirmed with wife Gabriela on admission  Disposition: Tele      Sandy Macias, DO  Hospitalist     "

## 2025-01-12 VITALS
WEIGHT: 229.28 LBS | TEMPERATURE: 98.2 F | BODY MASS INDEX: 32.1 KG/M2 | HEART RATE: 75 BPM | HEIGHT: 71 IN | SYSTOLIC BLOOD PRESSURE: 150 MMHG | RESPIRATION RATE: 16 BRPM | OXYGEN SATURATION: 92 % | DIASTOLIC BLOOD PRESSURE: 68 MMHG

## 2025-01-12 LAB
ANION GAP SERPL CALC-SCNC: 15 MMOL/L (ref 10–20)
BACTERIA UR CULT: ABNORMAL
BUN SERPL-MCNC: 20 MG/DL (ref 6–23)
CALCIUM SERPL-MCNC: 9.2 MG/DL (ref 8.6–10.3)
CHLORIDE SERPL-SCNC: 104 MMOL/L (ref 98–107)
CO2 SERPL-SCNC: 24 MMOL/L (ref 21–32)
CREAT SERPL-MCNC: 0.95 MG/DL (ref 0.5–1.3)
EGFRCR SERPLBLD CKD-EPI 2021: 83 ML/MIN/1.73M*2
GLUCOSE BLD MANUAL STRIP-MCNC: 125 MG/DL (ref 74–99)
GLUCOSE BLD MANUAL STRIP-MCNC: 249 MG/DL (ref 74–99)
GLUCOSE BLD MANUAL STRIP-MCNC: 258 MG/DL (ref 74–99)
GLUCOSE BLD MANUAL STRIP-MCNC: 293 MG/DL (ref 74–99)
GLUCOSE SERPL-MCNC: 119 MG/DL (ref 74–99)
HOLD SPECIMEN: NORMAL
POTASSIUM SERPL-SCNC: 3.7 MMOL/L (ref 3.5–5.3)
SODIUM SERPL-SCNC: 139 MMOL/L (ref 136–145)

## 2025-01-12 PROCEDURE — 99232 SBSQ HOSP IP/OBS MODERATE 35: CPT | Performed by: STUDENT IN AN ORGANIZED HEALTH CARE EDUCATION/TRAINING PROGRAM

## 2025-01-12 PROCEDURE — 80048 BASIC METABOLIC PNL TOTAL CA: CPT | Performed by: INTERNAL MEDICINE

## 2025-01-12 PROCEDURE — 2500000005 HC RX 250 GENERAL PHARMACY W/O HCPCS: Performed by: INTERNAL MEDICINE

## 2025-01-12 PROCEDURE — 36415 COLL VENOUS BLD VENIPUNCTURE: CPT | Performed by: INTERNAL MEDICINE

## 2025-01-12 PROCEDURE — 2500000004 HC RX 250 GENERAL PHARMACY W/ HCPCS (ALT 636 FOR OP/ED): Performed by: INTERNAL MEDICINE

## 2025-01-12 PROCEDURE — 82947 ASSAY GLUCOSE BLOOD QUANT: CPT

## 2025-01-12 PROCEDURE — 82565 ASSAY OF CREATININE: CPT | Performed by: INTERNAL MEDICINE

## 2025-01-12 PROCEDURE — 99232 SBSQ HOSP IP/OBS MODERATE 35: CPT | Performed by: INTERNAL MEDICINE

## 2025-01-12 PROCEDURE — 2500000002 HC RX 250 W HCPCS SELF ADMINISTERED DRUGS (ALT 637 FOR MEDICARE OP, ALT 636 FOR OP/ED): Performed by: INTERNAL MEDICINE

## 2025-01-12 PROCEDURE — 2500000001 HC RX 250 WO HCPCS SELF ADMINISTERED DRUGS (ALT 637 FOR MEDICARE OP): Performed by: INTERNAL MEDICINE

## 2025-01-12 PROCEDURE — 1200000002 HC GENERAL ROOM WITH TELEMETRY DAILY

## 2025-01-12 RX ORDER — HALOPERIDOL 5 MG/ML
2 INJECTION INTRAMUSCULAR EVERY 6 HOURS PRN
Status: ACTIVE | OUTPATIENT
Start: 2025-01-12

## 2025-01-12 RX ORDER — INSULIN GLARGINE 100 [IU]/ML
5 INJECTION, SOLUTION SUBCUTANEOUS NIGHTLY
Status: DISPENSED | OUTPATIENT
Start: 2025-01-12

## 2025-01-12 RX ORDER — TALC
3 POWDER (GRAM) TOPICAL NIGHTLY
Status: DISPENSED | OUTPATIENT
Start: 2025-01-12

## 2025-01-12 RX ORDER — LIDOCAINE 560 MG/1
1 PATCH PERCUTANEOUS; TOPICAL; TRANSDERMAL DAILY
Status: DISPENSED | OUTPATIENT
Start: 2025-01-12

## 2025-01-12 RX ADMIN — ROSUVASTATIN CALCIUM 20 MG: 10 TABLET, FILM COATED ORAL at 08:08

## 2025-01-12 RX ADMIN — ASPIRIN 81 MG: 81 TABLET, COATED ORAL at 08:08

## 2025-01-12 RX ADMIN — LEVETIRACETAM 500 MG: 500 TABLET, FILM COATED ORAL at 20:55

## 2025-01-12 RX ADMIN — INSULIN GLARGINE 5 UNITS: 100 INJECTION, SOLUTION SUBCUTANEOUS at 20:53

## 2025-01-12 RX ADMIN — Medication 3 MG: at 20:56

## 2025-01-12 RX ADMIN — INSULIN LISPRO 9 UNITS: 100 INJECTION, SOLUTION INTRAVENOUS; SUBCUTANEOUS at 20:53

## 2025-01-12 RX ADMIN — BUSPIRONE HYDROCHLORIDE 10 MG: 10 TABLET ORAL at 17:18

## 2025-01-12 RX ADMIN — LEVETIRACETAM 500 MG: 500 TABLET, FILM COATED ORAL at 08:08

## 2025-01-12 RX ADMIN — CEFTRIAXONE SODIUM 1 G: 1 INJECTION, SOLUTION INTRAVENOUS at 17:55

## 2025-01-12 RX ADMIN — RISPERIDONE 1 MG: 1 TABLET, FILM COATED ORAL at 20:56

## 2025-01-12 RX ADMIN — BUSPIRONE HYDROCHLORIDE 10 MG: 10 TABLET ORAL at 08:08

## 2025-01-12 RX ADMIN — ENOXAPARIN SODIUM 40 MG: 40 INJECTION SUBCUTANEOUS at 17:18

## 2025-01-12 RX ADMIN — SERTRALINE HYDROCHLORIDE 100 MG: 100 TABLET ORAL at 08:08

## 2025-01-12 RX ADMIN — INSULIN LISPRO 9 UNITS: 100 INJECTION, SOLUTION INTRAVENOUS; SUBCUTANEOUS at 17:18

## 2025-01-12 RX ADMIN — SERTRALINE HYDROCHLORIDE 100 MG: 100 TABLET ORAL at 20:56

## 2025-01-12 RX ADMIN — LIDOCAINE 1 PATCH: 4 PATCH TOPICAL at 17:55

## 2025-01-12 RX ADMIN — POLYVINYL ALCOHOL, POVIDONE 1 DROP: 14; 6 SOLUTION/ DROPS OPHTHALMIC at 21:03

## 2025-01-12 RX ADMIN — INSULIN LISPRO 6 UNITS: 100 INJECTION, SOLUTION INTRAVENOUS; SUBCUTANEOUS at 12:34

## 2025-01-12 ASSESSMENT — COGNITIVE AND FUNCTIONAL STATUS - GENERAL
EATING MEALS: A LITTLE
HELP NEEDED FOR BATHING: A LOT
CLIMB 3 TO 5 STEPS WITH RAILING: TOTAL
WALKING IN HOSPITAL ROOM: TOTAL
DAILY ACTIVITIY SCORE: 14
MOVING FROM LYING ON BACK TO SITTING ON SIDE OF FLAT BED WITH BEDRAILS: A LITTLE
TURNING FROM BACK TO SIDE WHILE IN FLAT BAD: A LITTLE
PERSONAL GROOMING: A LITTLE
MOVING TO AND FROM BED TO CHAIR: A LOT
DRESSING REGULAR UPPER BODY CLOTHING: A LOT
DRESSING REGULAR LOWER BODY CLOTHING: A LOT
STANDING UP FROM CHAIR USING ARMS: A LOT
MOBILITY SCORE: 12
TOILETING: A LOT

## 2025-01-12 ASSESSMENT — PAIN SCALES - GENERAL
PAINLEVEL_OUTOF10: 0 - NO PAIN
PAINLEVEL_OUTOF10: 0 - NO PAIN

## 2025-01-12 ASSESSMENT — PAIN SCALES - WONG BAKER: WONGBAKER_NUMERICALRESPONSE: NO HURT
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
## 2025-01-12 NOTE — CARE PLAN
The patient's goals for the shift include  safety, free from injury/falls    The clinical goals for the shift include remain hemodynamcially stable

## 2025-01-12 NOTE — PROGRESS NOTES
"Cardiology Consult Progress Note:    Interval History:  - No active cardiac complaints; remains hemodynamically stable    Objective:     Inpatient Medications:  Scheduled medications   Medication Dose Route Frequency    aspirin  81 mg oral Daily    busPIRone  10 mg oral BID    cefTRIAXone  1 g intravenous q24h    enoxaparin  40 mg subcutaneous q24h    insulin lispro  0-15 Units subcutaneous Before meals & nightly    levETIRAcetam  500 mg oral BID    lidocaine  1 patch transdermal Daily    [Held by provider] lisinopril  2.5 mg oral Daily    lubricating eye drops  1 drop Both Eyes Nightly    melatonin  3 mg oral Nightly    risperiDONE  1 mg oral Nightly    rosuvastatin  20 mg oral Daily    sertraline  100 mg oral BID       PRN medications   Medication    acetaminophen    Or    acetaminophen    Or    acetaminophen    alum-mag hydroxide-simeth    haloperidol lactate    LORazepam    polyethylene glycol       Continuous Medications   Medication Dose Last Rate       Intake / Output Summary:     Intake/Output Summary (Last 24 hours) at 1/12/2025 1352  Last data filed at 1/12/2025 0815  Gross per 24 hour   Intake 770 ml   Output 850 ml   Net -80 ml       Net IO Since Admission: -1,330 mL [01/12/25 1352]    Physical Exam:  /62   Pulse 72   Temp 36.2 °C (97.2 °F)   Resp 16   Ht 1.803 m (5' 11\")   Wt 104 kg (229 lb 4.5 oz)   SpO2 95%   BMI 31.98 kg/m²     Physical Exam  Constitutional:       General: He is not in acute distress.     Appearance: He is obese.   HENT:      Head: Normocephalic.      Mouth/Throat:      Mouth: Mucous membranes are moist.   Eyes:      Extraocular Movements: Extraocular movements intact.      Conjunctiva/sclera: Conjunctivae normal.   Neck:      Vascular: No JVD.   Cardiovascular:      Rate and Rhythm: Normal rate and regular rhythm.      Heart sounds: No murmur heard.  Pulmonary:      Effort: Pulmonary effort is normal. No respiratory distress.      Breath sounds: Normal breath sounds. "   Abdominal:      General: Bowel sounds are normal. There is no distension.      Palpations: Abdomen is soft.   Musculoskeletal:         General: No swelling.   Skin:     General: Skin is warm and dry.   Neurological:      Mental Status: He is alert. Mental status is at baseline.      Cranial Nerves: No cranial nerve deficit.      Motor: No weakness.      Comments: Oriented to self   Psychiatric:         Mood and Affect: Mood normal.         Behavior: Behavior normal.         Lab/Radiology/Diagnostic Review:    Labs  Results for orders placed or performed during the hospital encounter of 01/10/25 (from the past 24 hours)   POCT GLUCOSE   Result Value Ref Range    POCT Glucose 226 (H) 74 - 99 mg/dL   POCT GLUCOSE   Result Value Ref Range    POCT Glucose 217 (H) 74 - 99 mg/dL   Lavender Top   Result Value Ref Range    Extra Tube Hold for add-ons.    Basic Metabolic Panel   Result Value Ref Range    Glucose 119 (H) 74 - 99 mg/dL    Sodium 139 136 - 145 mmol/L    Potassium 3.7 3.5 - 5.3 mmol/L    Chloride 104 98 - 107 mmol/L    Bicarbonate 24 21 - 32 mmol/L    Anion Gap 15 10 - 20 mmol/L    Urea Nitrogen 20 6 - 23 mg/dL    Creatinine 0.95 0.50 - 1.30 mg/dL    eGFR 83 >60 mL/min/1.73m*2    Calcium 9.2 8.6 - 10.3 mg/dL   POCT GLUCOSE   Result Value Ref Range    POCT Glucose 125 (H) 74 - 99 mg/dL   POCT GLUCOSE   Result Value Ref Range    POCT Glucose 249 (H) 74 - 99 mg/dL       Peripheral IV 01/11/25 22 G Right Forearm (Active)   Placement Date/Time: 01/11/25 1800   Size (Gauge): 22 G  Orientation: Right  Location: Forearm   Number of days: 0       Urethral Catheter Coude 14 Fr. (Active)   Placement Date/Time: 01/11/25 1750   Catheter Type: Coude  Tube Size (Fr.): 14 Fr.  Catheter Balloon Size: 10 mL   Number of days: 0        Troponin I, High Sensitivity   Date/Time Value Ref Range Status   01/11/2025 05:13  (HH) 0 - 20 ng/L Final     Comment:     Previous result verified on 1/10/2025 1222 on specimen/case  [id number] called with component TRPHS for procedure Troponin I, High Sensitivity, Initial with value 148 ng/L.   01/10/2025 03:30  (HH) 0 - 20 ng/L Final     Comment:     Previous result verified on 1/10/2025 1222 on specimen/case [id number] called with component TRPHS for procedure Troponin I, High Sensitivity, Initial with value 148 ng/L.   01/10/2025 01:03  (HH) 0 - 20 ng/L Final     Comment:     Previous result verified on 1/10/2025 1222 on specimen/case [id number] called with component TRPHS for procedure Troponin I, High Sensitivity, Initial with value 148 ng/L.     BNP   Date/Time Value Ref Range Status   01/10/2025 11:14  (H) 0 - 99 pg/mL Final   02/21/2024 07:19 PM 77 0 - 99 pg/mL Final     Hemoglobin A1C   Date/Time Value Ref Range Status   10/28/2024 07:24 AM 7.4 (H) See comment % Final   07/29/2024 02:18 PM 7.4 (H) see below % Final     LDL Calculated   Date/Time Value Ref Range Status   02/22/2024 05:01 AM 97 <=99 mg/dL Final     Comment:                                 Near   Borderline      AGE      Desirable  Optimal    High     High     Very High     0-19 Y     0 - 109     ---    110-129   >/= 130     ----    20-24 Y     0 - 119     ---    120-159   >/= 160     ----      >24 Y     0 -  99   100-129  130-159   160-189     >/=190       VLDL   Date/Time Value Ref Range Status   02/22/2024 05:01 AM 24 0 - 40 mg/dL Final   01/19/2021 07:08 AM 24 0 - 40 mg/dL Final       Assessment:    74 y.o.  male who presented with after an unwitnessed fall versus syncope. Past medical history of type 2 diabetes mellitus, hypertension, dyslipidemia, GERD, BPH, history of seizures, COPD, and dementia.     Patient with unwitnessed fall versus syncope.  Recommend repeat transthoracic echocardiogram.  Prior nuclear medicine stress test in 2022 was low risk for ischemia.  Elevated troponin without significant uptrend, EKG without acute ischemic changes; acute coronary syndrome is  not suspected at this time.     Overall Recommendations:     1.  Unwitnessed fall versus syncope  - Monitor on telemetry  - Check complete transthoracic echocardiogram (pending)      2.  Elevated troponin  - ACS is not suspected; likely secondary to supply demand mismatch     3.  Altered mental status  - Patient with underlying dementia; possible UTI  - Management as per primary team     Thank you for the cardiology consult. We will follow with you.      Miguel Odom MD

## 2025-01-12 NOTE — PROGRESS NOTES
"Hospital Medicine Progress Note    Subjective:  Albert Abbott is a 74 y.o. male, who is hospital day 2.     Patient awake and alert today. Endorses some lower back pain. Mildly confused. No additional complaints.     Objective:    /66   Pulse 67   Temp 35.2 °C (95.4 °F)   Resp 16   Ht 1.803 m (5' 11\")   Wt 104 kg (229 lb 4.5 oz)   SpO2 93%   BMI 31.98 kg/m²     Physical Exam:  General: Awake, alert, no distress, pleasant  HEENT: NC/AT, clear sclera, MMM  NECK: Supple  Cardiovascular: RRR, no murmurs. S1/S2  Respiratory: CTAB, no RRW or crackles. No distress  Abdomen: Soft, ND, non-tender. BS+  Extremities: No peripheral edema  Neurological: Moving all extremities, mildly confused  Skin: Warm and dry, no rashes  Psych: appropriate    I personally reviewed all imaging, labs and notes/ documentation.     Assessment & Plan:    Unwitnessed fall vs syncope  Elevated troponin, non-MI  Hyperglycemia, T2DM  UTI, acute urinary retention  Acute metabolic encephalopathy, improving    Seizures  HTN, HLD  GERD  BPH  MDD, MARILYN, PTSD  CVA  COPD  Dementia    Monitor on tele, trop peaked at 180, no concerning EKG changes, asa/ statin, cards consulted- advising echo  ISS, monitor BG, no acidosis on VBG  Rocephin day 3 pending urine cx  PT, OT, fall precautions  Cont home meds, holding glyburide, metformin, Actos    DVT Prophylaxis: Lovenox, SCDs   Code Status: DNR and No Intubation  confirmed with wife Gabriela on admission  Disposition: Tele, will update wife over the phone today      Sandy Macias DO  Hospitalist     "

## 2025-01-13 ENCOUNTER — APPOINTMENT (OUTPATIENT)
Dept: CARDIOLOGY | Facility: HOSPITAL | Age: 75
End: 2025-01-13
Payer: MEDICARE

## 2025-01-13 LAB
AORTIC VALVE PEAK VELOCITY: 1.51 M/S
AV PEAK GRADIENT: 9 MMHG
AVA (PEAK VEL): 2.07 CM2
EJECTION FRACTION APICAL 4 CHAMBER: 61.5
EJECTION FRACTION: 60 %
GLUCOSE BLD MANUAL STRIP-MCNC: 153 MG/DL (ref 74–99)
GLUCOSE BLD MANUAL STRIP-MCNC: 235 MG/DL (ref 74–99)
GLUCOSE BLD MANUAL STRIP-MCNC: 295 MG/DL (ref 74–99)
GLUCOSE BLD MANUAL STRIP-MCNC: 330 MG/DL (ref 74–99)
LEFT ATRIUM VOLUME AREA LENGTH INDEX BSA: 22.3 ML/M2
LEFT VENTRICLE INTERNAL DIMENSION DIASTOLE: 4.68 CM (ref 3.5–6)
LEFT VENTRICULAR OUTFLOW TRACT DIAMETER: 2.1 CM
MITRAL VALVE E/A RATIO: 0.8
RIGHT VENTRICLE FREE WALL PEAK S': 10.8 CM/S
RIGHT VENTRICLE PEAK SYSTOLIC PRESSURE: 31.1 MMHG
TRICUSPID ANNULAR PLANE SYSTOLIC EXCURSION: 3.2 CM

## 2025-01-13 PROCEDURE — 93306 TTE W/DOPPLER COMPLETE: CPT | Performed by: STUDENT IN AN ORGANIZED HEALTH CARE EDUCATION/TRAINING PROGRAM

## 2025-01-13 PROCEDURE — 2500000001 HC RX 250 WO HCPCS SELF ADMINISTERED DRUGS (ALT 637 FOR MEDICARE OP): Performed by: INTERNAL MEDICINE

## 2025-01-13 PROCEDURE — C8929 TTE W OR WO FOL WCON,DOPPLER: HCPCS

## 2025-01-13 PROCEDURE — 2500000005 HC RX 250 GENERAL PHARMACY W/O HCPCS: Performed by: INTERNAL MEDICINE

## 2025-01-13 PROCEDURE — 2500000002 HC RX 250 W HCPCS SELF ADMINISTERED DRUGS (ALT 637 FOR MEDICARE OP, ALT 636 FOR OP/ED): Performed by: INTERNAL MEDICINE

## 2025-01-13 PROCEDURE — 99238 HOSP IP/OBS DSCHRG MGMT 30/<: CPT | Performed by: INTERNAL MEDICINE

## 2025-01-13 PROCEDURE — 97165 OT EVAL LOW COMPLEX 30 MIN: CPT | Mod: GO

## 2025-01-13 PROCEDURE — 2500000004 HC RX 250 GENERAL PHARMACY W/ HCPCS (ALT 636 FOR OP/ED): Performed by: INTERNAL MEDICINE

## 2025-01-13 PROCEDURE — 82947 ASSAY GLUCOSE BLOOD QUANT: CPT

## 2025-01-13 PROCEDURE — 97161 PT EVAL LOW COMPLEX 20 MIN: CPT | Mod: GP

## 2025-01-13 PROCEDURE — 1200000002 HC GENERAL ROOM WITH TELEMETRY DAILY

## 2025-01-13 RX ORDER — LISINOPRIL 5 MG/1
2.5 TABLET ORAL DAILY
Status: DISCONTINUED | OUTPATIENT
Start: 2025-01-13 | End: 2025-01-14 | Stop reason: HOSPADM

## 2025-01-13 RX ORDER — CIPROFLOXACIN 500 MG/1
500 TABLET ORAL 2 TIMES DAILY
Status: DISCONTINUED | OUTPATIENT
Start: 2025-01-13 | End: 2025-01-14 | Stop reason: HOSPADM

## 2025-01-13 RX ORDER — CIPROFLOXACIN 500 MG/1
500 TABLET ORAL 2 TIMES DAILY
Qty: 14 TABLET | Refills: 0 | Status: SHIPPED | OUTPATIENT
Start: 2025-01-13 | End: 2025-01-20

## 2025-01-13 RX ORDER — TAMSULOSIN HYDROCHLORIDE 0.4 MG/1
0.4 CAPSULE ORAL DAILY
Qty: 30 CAPSULE | Refills: 0 | Status: SHIPPED | OUTPATIENT
Start: 2025-01-13 | End: 2025-02-12

## 2025-01-13 RX ORDER — TAMSULOSIN HYDROCHLORIDE 0.4 MG/1
0.4 CAPSULE ORAL DAILY
Status: DISCONTINUED | OUTPATIENT
Start: 2025-01-13 | End: 2025-01-14 | Stop reason: HOSPADM

## 2025-01-13 RX ADMIN — INSULIN LISPRO 6 UNITS: 100 INJECTION, SOLUTION INTRAVENOUS; SUBCUTANEOUS at 17:37

## 2025-01-13 RX ADMIN — ROSUVASTATIN CALCIUM 20 MG: 10 TABLET, FILM COATED ORAL at 09:32

## 2025-01-13 RX ADMIN — INSULIN GLARGINE 5 UNITS: 100 INJECTION, SOLUTION SUBCUTANEOUS at 20:36

## 2025-01-13 RX ADMIN — ENOXAPARIN SODIUM 40 MG: 40 INJECTION SUBCUTANEOUS at 17:35

## 2025-01-13 RX ADMIN — BUSPIRONE HYDROCHLORIDE 10 MG: 10 TABLET ORAL at 16:11

## 2025-01-13 RX ADMIN — CIPROFLOXACIN 500 MG: 500 TABLET ORAL at 20:36

## 2025-01-13 RX ADMIN — HALOPERIDOL LACTATE 2 MG: 5 INJECTION, SOLUTION INTRAMUSCULAR at 22:55

## 2025-01-13 RX ADMIN — ASPIRIN 81 MG: 81 TABLET, COATED ORAL at 09:31

## 2025-01-13 RX ADMIN — PERFLUTREN 2 ML OF DILUTION: 6.52 INJECTION, SUSPENSION INTRAVENOUS at 14:41

## 2025-01-13 RX ADMIN — INSULIN LISPRO 3 UNITS: 100 INJECTION, SOLUTION INTRAVENOUS; SUBCUTANEOUS at 09:32

## 2025-01-13 RX ADMIN — INSULIN LISPRO 12 UNITS: 100 INJECTION, SOLUTION INTRAVENOUS; SUBCUTANEOUS at 20:36

## 2025-01-13 RX ADMIN — TAMSULOSIN HYDROCHLORIDE 0.4 MG: 0.4 CAPSULE ORAL at 17:35

## 2025-01-13 RX ADMIN — CIPROFLOXACIN 500 MG: 500 TABLET ORAL at 09:32

## 2025-01-13 RX ADMIN — LEVETIRACETAM 500 MG: 500 TABLET, FILM COATED ORAL at 20:36

## 2025-01-13 RX ADMIN — SERTRALINE HYDROCHLORIDE 100 MG: 100 TABLET ORAL at 20:35

## 2025-01-13 RX ADMIN — LIDOCAINE 1 PATCH: 4 PATCH TOPICAL at 17:35

## 2025-01-13 RX ADMIN — RISPERIDONE 1 MG: 1 TABLET, FILM COATED ORAL at 20:36

## 2025-01-13 RX ADMIN — SERTRALINE HYDROCHLORIDE 100 MG: 100 TABLET ORAL at 09:31

## 2025-01-13 RX ADMIN — LISINOPRIL 2.5 MG: 5 TABLET ORAL at 09:31

## 2025-01-13 RX ADMIN — LEVETIRACETAM 500 MG: 500 TABLET, FILM COATED ORAL at 09:31

## 2025-01-13 RX ADMIN — INSULIN LISPRO 9 UNITS: 100 INJECTION, SOLUTION INTRAVENOUS; SUBCUTANEOUS at 11:46

## 2025-01-13 RX ADMIN — Medication 3 MG: at 20:36

## 2025-01-13 RX ADMIN — BUSPIRONE HYDROCHLORIDE 10 MG: 10 TABLET ORAL at 09:31

## 2025-01-13 ASSESSMENT — COGNITIVE AND FUNCTIONAL STATUS - GENERAL
STANDING UP FROM CHAIR USING ARMS: A LITTLE
CLIMB 3 TO 5 STEPS WITH RAILING: TOTAL
TOILETING: TOTAL
EATING MEALS: A LITTLE
MOBILITY SCORE: 16
TURNING FROM BACK TO SIDE WHILE IN FLAT BAD: A LITTLE
STANDING UP FROM CHAIR USING ARMS: A LOT
WALKING IN HOSPITAL ROOM: A LITTLE
WALKING IN HOSPITAL ROOM: TOTAL
DRESSING REGULAR LOWER BODY CLOTHING: TOTAL
DAILY ACTIVITIY SCORE: 12
TURNING FROM BACK TO SIDE WHILE IN FLAT BAD: A LITTLE
MOVING FROM LYING ON BACK TO SITTING ON SIDE OF FLAT BED WITH BEDRAILS: A LITTLE
DRESSING REGULAR UPPER BODY CLOTHING: A LOT
MOVING TO AND FROM BED TO CHAIR: A LITTLE
MOVING FROM LYING ON BACK TO SITTING ON SIDE OF FLAT BED WITH BEDRAILS: A LITTLE
PERSONAL GROOMING: A LITTLE
MOBILITY SCORE: 12
CLIMB 3 TO 5 STEPS WITH RAILING: TOTAL
MOVING TO AND FROM BED TO CHAIR: A LOT
HELP NEEDED FOR BATHING: A LOT

## 2025-01-13 ASSESSMENT — PAIN - FUNCTIONAL ASSESSMENT
PAIN_FUNCTIONAL_ASSESSMENT: 0-10

## 2025-01-13 ASSESSMENT — PAIN SCALES - GENERAL
PAINLEVEL_OUTOF10: 0 - NO PAIN
PAINLEVEL_OUTOF10: 7
PAINLEVEL_OUTOF10: 7
PAINLEVEL_OUTOF10: 0 - NO PAIN
PAINLEVEL_OUTOF10: 4
PAINLEVEL_OUTOF10: 0 - NO PAIN

## 2025-01-13 ASSESSMENT — PAIN DESCRIPTION - DESCRIPTORS: DESCRIPTORS: ACHING

## 2025-01-13 ASSESSMENT — PAIN SCALES - WONG BAKER: WONGBAKER_NUMERICALRESPONSE: NO HURT

## 2025-01-13 ASSESSMENT — ACTIVITIES OF DAILY LIVING (ADL)
ADL_ASSISTANCE: INDEPENDENT
BATHING_ASSISTANCE: MAXIMAL

## 2025-01-13 NOTE — PROGRESS NOTES
01/13/25 1423   Discharge Planning   Assistance Needed Received message to return phone call to [facility] at [facility] [phone] re: Albert Abbott room 909. RAGINI called and spoke with [facility]  who stated that there is no staff name listed under [facility] or [place]. RAGINI unable to return call as department of [facility] was not listed either.

## 2025-01-13 NOTE — PROGRESS NOTES
01/13/25 1039   Discharge Planning   Living Arrangements Spouse/significant other   Support Systems Spouse/significant other   Assistance Needed assistance needed   Type of Residence Private residence  (ranch style home per patient)   Number of Stairs Within Residence   (chairlift to basement)   Intensity of Service   Intensity of Service 0-30 min     Care transitions at bedside to complete assessment.  TCC in to introduce self and complete assessment.  Patient demographics verified.  Patient stated that he lives at home with his wife.  Stated that he uses a cane/walker and has been pretty independent prior to coming to the hospital.  Patient stated that he uses [employer] transportation to get to appointments.  Also spoke with patient's wife Aga, via telephone.  Wife stated that patient has been sleeping 20 hrs per day and has not been taking his medications on a schedule.  She stated that she is planning on helping him with taking his medications.  Wife stated that patient doesn't really use assistive devices in the home but will use a walking stick vs a cane when out of the home.  Stated that he does help with taking the garbage out to the curb weekly.  Wife expressed interest in having someone come to the home to assist patient with adls.  Wife expressed concern over bills from last hospital admission.  PT/OT notes are currrently pending.  Care transitions to follow.      PCP: Dr Jaylen Morrison (last seen Nov 25)  Insurance: [employer]  Pharmacy: [facility] on [address]    1642pm  Update received from physician.  Wife of pt is declining HHC/SNF.  Care transitions to follow.

## 2025-01-13 NOTE — CONSULTS
"Nutrition Initial Assessment:   Nutrition Assessment    Reason for Assessment: Admission nursing screening (MST=2 for unsure of weight loss)    Patient is a 74 y.o. male presenting with elevated troponin; s/p fall     Pmhx: T2DM, HTN, HLD, GERD, BPH, MDD, MARILYN, PTSD, CVA, COPD, DEMENTIA    Nutrition History:  Energy Intake: Fair 50-75 %  Food and Nutrient History: Pt laying in bed at time of visit today  Pt reports his appetite has been good since being admitted. At home pt admits to skipping meals occasionally. He usually eats 2 meals per day.  Intakes are \"irregular\".  Wife does the cooking, pt does the dishes. Pt is edentulous, does not have dentures here.  Tolerating meals.  Vitamin/Herbal Supplement Use: none noted  Food Allergies/Intolerances:  None  GI Symptoms: None  Oral Problems: None       Anthropometrics:  Height: 180.3 cm (5' 10.98\")   Weight: 104 kg (229 lb 4.5 oz)   BMI (Calculated): 31.99  IBW/kg (Dietitian Calculated): 78.2 kg  Percent of IBW: 133 %       Weight History:     Weight Change %:  Weight History / % Weight Change: 11/25 107kg, 10/28 107kg, 7/29 112kg, 4/22 111kg, 3/19 110kg, 2/21 109kg  Significant Weight Loss: No    Nutrition Focused Physical Exam Findings:    Subcutaneous Fat Loss:   Orbital Fat Pads: Well nourished (slightly bulging fat pads)  Buccal Fat Pads: Well nourished (full, rounded cheeks)  Triceps: Well nourished (ample fat tissue)  Muscle Wasting:  Temporalis: Well nourished (well-defined muscle)  Pectoralis (Clavicular Region): Well nourished (clavicle not visible)  Deltoid/Trapezius: Well nourished (rounded appearance at arm, shoulder, neck)  Edema:  Edema: none  Physical Findings:  Skin: Negative    Nutrition Significant Labs:  CBC Trend:   Results from last 7 days   Lab Units 01/11/25  0513 01/10/25  1114   WBC AUTO x10*3/uL 6.7 10.9   RBC AUTO x10*6/uL 3.81* 4.06*   HEMOGLOBIN g/dL 11.8* 12.8*   HEMATOCRIT % 36.9* 39.5*   MCV fL 97 97   PLATELETS AUTO x10*3/uL 200 217    " , BMP Trend:   Results from last 7 days   Lab Units 01/12/25  0452 01/11/25  0513 01/10/25  1114   GLUCOSE mg/dL 119* 118* 345*   CALCIUM mg/dL 9.2 9.2 9.5   SODIUM mmol/L 139 138 134*   POTASSIUM mmol/L 3.7 3.8 4.4   CO2 mmol/L 24 24 19*   CHLORIDE mmol/L 104 106 101   BUN mg/dL 20 26* 25*   CREATININE mg/dL 0.95 0.85 1.11    , A1C:  Lab Results   Component Value Date    HGBA1C 7.4 (H) 10/28/2024   , BG POCT trend:   Results from last 7 days   Lab Units 01/13/25  1138 01/13/25  0629 01/12/25  2004 01/12/25  1636 01/12/25  1157   POCT GLUCOSE mg/dL 295* 153* 258* 293* 249*        Nutrition Specific Medications:  Reviewed     I/O:   Last BM Date: 01/10/25;      Dietary Orders (From admission, onward)       Start     Ordered    01/11/25 0756  May Participate in Room Service With Assistance  ( ROOM SERVICE MAY PARTICIPATE WITH ASSISTANCE)  Once        Question:  .  Answer:  Yes    01/11/25 0755    01/10/25 1630  Adult diet Consistent Carb; CCD 75 gm/meal  Diet effective now        Question Answer Comment   Diet type Consistent Carb    Carb diet selection: CCD 75 gm/meal        01/10/25 1629    Unscheduled  Oral nutritional supplements  Until discontinued        Question Answer Comment   Deliver with Breakfast    Deliver with Dinner    Select supplement: Sugar Free Mighty Shake        01/13/25 1259                     Estimated Needs:      Method for Estimating Needs: 1800-2100kcals (17-20kcals/kg ABW)     Method for Estimating Needs: 83-104g ( 0.8-1.0g/kg ABW)     Method for Estimating Needs: 1 mL/kcal or as per MD        Nutrition Diagnosis   Malnutrition Diagnosis  Patient has Malnutrition Diagnosis: No    Nutrition Diagnosis  Patient has Nutrition Diagnosis: Yes  Diagnosis Status (1): New  Nutrition Diagnosis 1: Inadequate oral intake  Related to (1): acute on chronic illness; dementia  As Evidenced by (1): avg 65% of meals here; PTA 2 meals per day       Nutrition Interventions/Recommendations         Nutrition  Prescription:  Individualized Nutrition Prescription Provided for : 75gm carb controlled diet.  Will order Reduced sugar  Mighty shake BID        Nutrition Interventions:   Interventions: Meals and snacks, Medical food supplement  Meals and Snacks: Carbohydrate-modified diet  Goal: consume >75% of meals  Medical Food Supplement: Commercial beverage  Goal: consume >75% Reduced sugar Mighty shakes BID (for an additional 200 kcals, 7 gm protein each)    Collaboration and Referral of Nutrition Care:  (spoke with pt)    Nutrition Education:   Pt declined education      Nutrition Monitoring and Evaluation   Food/Nutrient Related History Monitoring  Monitoring and Evaluation Plan: Energy intake, Amount of food, Fluid intake  Energy Intake: Estimated energy intake  Criteria: Meal/ONS intake to meet >75% of estimated needs  Fluid Intake: Estimated fluid intake  Criteria: fluid intake to meet >75% of estimated need  Amount of Food: Estimated amout of food, Medical food intake  Criteria: Pt to consume >75% of meals/ONS    Body Composition/Growth/Weight History  Monitoring and Evaluation Plan: Weight  Weight: Measured weight  Criteria: maintain stable weight    Biochemical Data, Medical Tests and Procedures  Monitoring and Evaluation Plan: Electrolyte/renal panel, Glucose/endocrine profile  Criteria: labs WNL  Glucose/Endocrine Profile: Glucose, casual  Criteria: BG within desireable range              Time Spent (min): 45 minutes

## 2025-01-13 NOTE — DISCHARGE SUMMARY
Discharge Diagnosis  Unwitnessed fall vs syncope  Elevated troponin, non-MI  Hyperglycemia, T2DM  UTI, acute urinary retention  Acute metabolic encephalopathy, resolved    Issues Requiring Follow-Up  As above    Discharge Meds     Medication List      START taking these medications     ciprofloxacin 500 mg tablet; Commonly known as: Cipro; Take 1 tablet   (500 mg) by mouth 2 times a day for 7 days.     CHANGE how you take these medications     tamsulosin 0.4 mg 24 hr capsule; Commonly known as: Flomax; Take 1   capsule (0.4 mg) by mouth once daily.; What changed: when to take this     CONTINUE taking these medications     glyBURIDE 5 mg tablet; Commonly known as: Diabeta   levETIRAcetam 500 mg tablet; Commonly known as: Keppra   lisinopril 5 mg tablet   metFORMIN 1,000 mg tablet; Commonly known as: Glucophage   pioglitazone 45 mg tablet; Commonly known as: Actos   polyethylene glycol 17 gram packet; Commonly known as: Glycolax,   Miralax; Take 17 g by mouth once daily as needed (constipation).   Refresh Celluvisc 1 % ophthalmic solution; Generic drug:   carboxymethylcellulose   risperiDONE 1 mg tablet; Commonly known as: RisperDAL   rosuvastatin 20 mg tablet; Commonly known as: Crestor   sertraline 100 mg tablet; Commonly known as: Zoloft; Take 1 tablet (100   mg) by mouth 2 times a day.   Wal-Zyr (cetirizine) 10 mg tablet; Generic drug: cetirizine     STOP taking these medications     folic acid 1 mg tablet; Commonly known as: Folvite   pantoprazole 40 mg EC tablet; Commonly known as: ProtoNix   sennosides 8.6 mg tablet; Commonly known as: Senokot   thiamine 100 mg tablet; Commonly known as: Vitamin B-1   Vitamin D3 50 MCG (2000 UT) tablet; Generic drug: cholecalciferol     ASK your doctor about these medications     albuterol 90 mcg/actuation inhaler   aspirin 81 mg EC tablet   busPIRone 10 mg tablet; Commonly known as: Buspar       Test Results Pending At Discharge  Pending Labs       No current pending labs.             Hospital Course   Albert Abbott is a 74 y.o. male with T2DM, seizures, HTN, HLD, GERD, BPH, MDD, MARILYN, PTSD, CVA, COPD, dementia, who presented with an unwitnessed fall. Pt unable to provide any hx. Wife called and provided hx. She heard a noise and found pt on the ground in the bathroom. He did not endorse any injures to her but was more confused than baseline, wife stated he did not know who she was. Wife stated pt normally spends about 20 hours a day in bed and just gets up to eat, he doesn't take his meds as prescribed and she is not sure if he has been taking his DM meds. Work up in ED with elevated troponin, no significant EKG changes, UA mildly positive, hyperglycemia. Pt admitted and tx with abx, urine cx with pseudomonas. Pt required pack for acute retention, pack removed for TOV. Cards consulted given concern for poss syncope, advised echo, which did not reveal any significant abnormalities. Pt assessed by therapy, wife declined SNF or HHC despite pt likely would benefit from further therapy. Pt improved with tx and is ready for dc home. Wife is to pick him up tomorrow am as it is already late today and pack just removed for voiding trial. Pt will complete abx course at home, wife updated.    Pertinent Physical Exam At Time of Discharge  Physical Exam  General: Awake, alert, no distress, pleasant  HEENT: NC/AT, clear sclera, MMM  NECK: Supple  Cardiovascular: RRR, no murmurs. S1/S2  Respiratory: CTAB, no RRW or crackles. No distress  Abdomen: Soft, ND, non-tender. BS+  Extremities: No peripheral edema  Neurological: Moving all extremities, mildly confused  Skin: Warm and dry, no rashes  Psych: appropriate    Outpatient Follow-Up  Future Appointments   Date Time Provider Department Center   2/25/2025  2:30 PM Tani Morrison MD [id number] [place]   3/5/2025 10:00 AM [facility] RN 2 [id number] [facility]   3/5/2025 10:30 AM REENA Muniz [id number] [facility]   3/5/2025 11:00 AM Karloine  [id number]      Sandy Macias DO

## 2025-01-13 NOTE — PROGRESS NOTES
"Occupational Therapy    Occupational Therapy    Evaluation    Patient Name: Albert Abbott \"Bill\"  MRN: [id number]  Today's Date: 1/13/2025  Time Calculation  Start Time: 0952  Stop Time: 1009  Time Calculation (min): 17 min  909/909-A    Assessment  IP OT Assessment  OT Assessment: impaired adls  Prognosis: Good  Barriers to Discharge Home: Physical needs  Physical Needs: 24hr mobility assistance needed, 24hr ADL assistance needed  Medical Staff Made Aware: Yes  End of Session Communication: Bedside nurse  End of Session Patient Position: Bed, 2 rail up, Alarm on (call light in reach. all needs met)    Plan:  Treatment Interventions: ADL retraining, Functional transfer training, Endurance training, Patient/family training, Equipment evaluation/education  OT Frequency: 3 times per week  OT Discharge Recommendations: Moderate intensity level of continued care  OT - OK to Discharge: Yes (once cleared by medical team)    Subjective     Current Problem:  1. Fall, initial encounter        2. Elevated troponin  Transthoracic Echo (TTE) Complete    Transthoracic Echo (TTE) Complete      3. Elevated brain natriuretic peptide (BNP) level        4. Syncope and collapse  Transthoracic Echo (TTE) Complete    Transthoracic Echo (TTE) Complete          General:  General  Reason for Referral: OT eval and treat for adls;   DX: fall vs. syncope; elevated troponin; possible UTI, hyperglycemia; acute metabolic encephalopathy  Referred By: Sandy Macias DO  Past Medical History Relevant to Rehab: 74 y.o. male with T2DM, seizures, HTN, HLD, GERD, BPH, MDD, MARILYN, PTSD, CVA, COPD, dementia, who presents with an unwitnessed fall.  Family/Caregiver Present: No  Co-Treatment: PT  Co-Treatment Reason: to maximize pt. safety and mobility  Prior to Session Communication: Bedside nurse  Patient Position Received: Bed, 2 rail up, Alarm on  General Comment: pt. pleasant and agreeable to therapy    Precautions:  Medical Precautions: Fall " precautions  Precautions Comment: pack catheter    Pain:  Pain Assessment  Pain Assessment: 0-10  0-10 (Numeric) Pain Score: 7  Pain Location: Back    Objective     Cognition:  Overall Cognitive Status: Within Functional Limits (pt. able to follow directions.)  Orientation Level:  (oriented x 3)     Home Living:  Type of Home: House  Lives With: Spouse  Home Adaptive Equipment: Long-handled shoehorn, Sock aid, Reacher, Cane (rollator, bedrail)  Home Layout: One level  Home Access:  (chair lift from garage level up to main living area)  Bathroom Shower/Tub: Walk-in shower (chair, grab bar, hhs)  Bathroom Toilet: Standard (bilat. toilet seat)     Prior Function:  Level of [place]: Independent with ADLs and functional transfers, Needs assistance with homemaking  Receives Help From:  (spouse)  ADL Assistance: Independent  Homemaking Assistance: Needs assistance  Ambulatory Assistance:  (pt. ind. without device inside; uses rollator outside)  Hand Dominance: Left  Prior Function Comments: pt. uses sock-aide, reachers, long-handled shoehorn for le dressing.    IADL History:  Homemaking Responsibilities: No  IADL Comments: spouse does homemaking, except pt. assists with folding laundry. pt. last drove a few months ago    ADL:  Eating Assistance: Stand by  Eating Deficit: Setup  Grooming Assistance: Minimal  Bathing Assistance: Maximal  UE Dressing Assistance: Moderate  LE Dressing Assistance: Total  Toileting Assistance with Device: Total    Bed Mobility/Transfers:   Bed Mobility  Bed Mobility:  (supine to sitting at eob with sba. sit to supine with min. assist.)  Transfers  Transfer:  (sit to stand with mod. assist of 2)    Ambulation/Gait Training:  Functional Mobility  Functional Mobility Performed:  (pt. ambulated a few side steps along eob with rolling walker with mod. assist of 2. pt. retropulsive.)    Sitting Balance:  Static Sitting Balance  Static Sitting-Level of Assistance: Moderate  assistance    Sensation:  Light Touch: No apparent deficits    Strength:  Strength Comments: bilat. elbow and  strengths 4-/5    Hand Function:  Hand Function  Gross Grasp: Functional  Coordination: Functional    Extremities:   RUE :  (~100 deg.shoulder flexion, distally wfl)   LUE:  (~90 deg. shoulder flexion, distally wfl)    Outcome Measures: [facility] Daily Activity  Putting on and taking off regular lower body clothing: Total  Bathing (including washing, rinsing, drying): A lot  Putting on and taking off regular upper body clothing: A lot  Toileting, which includes using toilet, bedpan or urinal: Total  Taking care of personal grooming such as brushing teeth: A little  Eating Meals: A little  Daily Activity - Total Score: 12     EDUCATION:     Education Documentation  ADL Training, taught by Pattie Colby OT at 1/13/2025  1:27 PM.  Learner: Patient  Readiness: Acceptance  Method: Explanation  Response: Verbalizes Understanding    Education Comments  No comments found.        Goals:   Encounter Problems       Encounter Problems (Active)       OT Goals       Pt. will perform feeding and grooming tasks ind.'ly using adaptive equipment as needed.  (Progressing)       Start:  01/13/25    Expected End:  01/27/25            Pt. will perform sponge bathing and dressing with min. assist using adaptive equipment as needed.  (Progressing)       Start:  01/13/25    Expected End:  01/27/25            Pt. will perform toileting with min. assist using adaptive equipment as needed.  (Progressing)       Start:  01/13/25    Expected End:  01/27/25            Pt. will perform bed mobility/chair/commode transfers with min. assist.  (Progressing)       Start:  01/13/25    Expected End:  01/27/25            Pt. will perform functional mobility with rolling walker with min. assist to access bathroom. (Progressing)       Start:  01/13/25    Expected End:  01/27/25

## 2025-01-13 NOTE — CARE PLAN
Problem: Pain - Adult  Goal: Verbalizes/displays adequate comfort level or baseline comfort level  Outcome: Progressing     Problem: Safety - Adult  Goal: Free from fall injury  Outcome: Progressing     Problem: Discharge Planning  Goal: Discharge to home or other facility with appropriate resources  Outcome: Progressing     Problem: Chronic Conditions and Co-morbidities  Goal: Patient's chronic conditions and co-morbidity symptoms are monitored and maintained or improved  Outcome: Progressing     Problem: Fall/Injury  Goal: Not fall by end of shift  Outcome: Progressing  Goal: Be free from injury by end of the shift  Outcome: Progressing     Problem: Skin  Goal: Participates in plan/prevention/treatment measures  Outcome: Progressing  Flowsheets (Taken 1/13/2025 0705)  Participates in plan/prevention/treatment measures: Elevate heels  Goal: Prevent/manage excess moisture  Outcome: Progressing  Flowsheets (Taken 1/13/2025 0705)  Prevent/manage excess moisture: Moisturize dry skin  Goal: Prevent/minimize sheer/friction injuries  Outcome: Progressing  Flowsheets (Taken 1/13/2025 0705)  Prevent/minimize sheer/friction injuries: Use pull sheet     Problem: Diabetes  Goal: Maintain glucose levels >70mg/dl to <250mg/dl throughout shift  Outcome: Progressing  Goal: No changes in neurological exam by end of shift  Outcome: Progressing  Goal: Vital signs within normal range for age by end of shift  Outcome: Progressing   The patient's goals for the shift include      The clinical goals for the shift include maintain safety

## 2025-01-13 NOTE — PROGRESS NOTES
"Physical Therapy    Physical Therapy Evaluation    Patient Name: Albert Abbott \"Bill\"  MRN: [id number]  Today's Date: 1/13/2025   Time Calculation  Start Time: 0951  Stop Time: 1009  Time Calculation (min): 18 min  909/909-A    Assessment/Plan   PT Assessment  PT Assessment Results: Decreased strength, Impaired balance, Decreased mobility  Rehab Prognosis: Fair  Evaluation/Treatment Tolerance: Patient tolerated treatment well  Medical Staff Made Aware: Yes  Strengths: Attitude of self  End of Session Communication: Bedside nurse  Assessment Comment: pt tolerated session. pt required assist during functional mobility to maintain safety. pt presented with decreased functional mobility, strength, and balance. pt would benefit from mod int therapy in order to return to PLOF.  End of Session Patient Position: Bed, 2 rail up, Alarm on (call light in reach)  IP OR SWING BED PT PLAN  Inpatient or Swing Bed: Inpatient  PT Plan  Treatment/Interventions: Bed mobility, Transfer training, Gait training, Balance training, Strengthening, Therapeutic exercise, Therapeutic activity  PT Plan: Ongoing PT  PT Frequency: 3 times per week  PT Discharge Recommendations: Moderate intensity level of continued care  PT Recommended Transfer Status: Assist x2  PT - OK to Discharge: Yes (once medically cleared)    Subjective     Current Problem:  1. Fall, initial encounter        2. Elevated troponin  Transthoracic Echo (TTE) Complete    Transthoracic Echo (TTE) Complete      3. Elevated brain natriuretic peptide (BNP) level        4. Syncope and collapse  Transthoracic Echo (TTE) Complete    Transthoracic Echo (TTE) Complete        Patient Active Problem List   Diagnosis    Chest pain, unspecified type    AMS (altered mental status)    Elevated troponin       General Visit Information:  General  Reason for Referral: PT eval  Referred By: Gilberto  Past Medical History Relevant to Rehab: CVA, depression, dementia, DM, CVA, HLD, PTSD, Peripheral " neuropathy, MDD, seizures  Co-Treatment: OT  Co-Treatment Reason: to maximize pt safety and mobility  Prior to Session Communication: Bedside nurse  Patient Position Received: Bed, 2 rail up, Alarm on  General Comment: pt agreeable to therapy    Home Living:  Home Living  Home Living Comments: pt lives with wife in house. pt has stair lift. uses rollator in community and no device in house. pt owns cane. pt sleeps in flat bed with rails. pt has WIS with chair, GBs, and HHS. pt has std toilet with rails. IND ADL. wife completes iADLs. hx of falls. orders groceries online.    Prior Level of Function:  Prior Function Per Pt/Caregiver Report  Level of [place]: Independent with ADLs and functional transfers    Precautions:  Precautions  Precautions Comment: falls, alarms       Objective     Pain:  Pain Assessment  Pain Assessment: 0-10  0-10 (Numeric) Pain Score: 7 (back pain)    Cognition:  Cognition  Overall Cognitive Status: Within Functional Limits    General Assessments:         Sensation  Light Touch: No apparent deficits  Strength  Strength Comments: BLE strength 3-/5 grossly. BLE ROM WFL for pts age                   Functional Assessments:     Bed Mobility  Bed Mobility: Yes  Bed Mobility 1  Bed Mobility 1: Supine to sitting, Sitting to supine  Bed Mobility Comments 1: SBA for sup to sit and minAx1 for sit to sup    Transfers  Transfer: Yes  Transfer 1  Transfer From 1: Bed to, Sit to, Stand to  Transfer Device 1: Walker  Trials/Comments 1: modAx2    Ambulation/Gait Training  Ambulation/Gait Training Performed: Yes  Ambulation/Gait Training 1  Surface 1: Level tile  Device 1: Rolling walker  Comments/Distance (ft) 1: modAx2, completes side steps to HOB with w/w. retropulsive and posting LE's on bed to maintain balance.        Outcome Measures:     [facility] Basic Mobility  Turning from your back to your side while in a flat bed without using bedrails: A little  Moving from lying on your back to sitting on the  side of a flat bed without using bedrails: A little  Moving to and from bed to chair (including a wheelchair): A little  Standing up from a chair using your arms (e.g. wheelchair or bedside chair): A little  To walk in hospital room: A little  Climbing 3-5 steps with railing: Total  Basic Mobility - Total Score: 16                   Goals:  Encounter Problems       Encounter Problems (Active)       PT Problem       STG - Pt will transfer STS with minAx1 (Progressing)       Start:  01/13/25    Expected End:  01/27/25            STG - Pt will transition supine <> sitting IND (Progressing)       Start:  01/13/25    Expected End:  01/27/25            STG - Pt will perform a B LE ther ex program of 2-3 sets of 10  (Progressing)       Start:  01/13/25    Expected End:  01/27/25            STG - Pt will amb 100' using w/w with minAX1 (Progressing)       Start:  01/13/25    Expected End:  01/27/25               Pain - Adult            Education Documentation  Mobility Training, taught by Jenny Sanchez, PT at 1/13/2025  1:25 PM.  Learner: Patient  Readiness: Acceptance  Method: Explanation  Response: Verbalizes Understanding    Education Comments  No comments found.

## 2025-01-14 ENCOUNTER — DOCUMENTATION (OUTPATIENT)
Dept: HOME HEALTH SERVICES | Facility: HOME HEALTH | Age: 75
End: 2025-01-14

## 2025-01-14 ENCOUNTER — APPOINTMENT (OUTPATIENT)
Dept: PRIMARY CARE | Facility: CLINIC | Age: 75
End: 2025-01-14
Payer: MEDICARE

## 2025-01-14 VITALS
HEART RATE: 55 BPM | TEMPERATURE: 96.4 F | HEIGHT: 71 IN | BODY MASS INDEX: 32.1 KG/M2 | WEIGHT: 229.28 LBS | DIASTOLIC BLOOD PRESSURE: 60 MMHG | RESPIRATION RATE: 18 BRPM | SYSTOLIC BLOOD PRESSURE: 126 MMHG | OXYGEN SATURATION: 97 %

## 2025-01-14 LAB — GLUCOSE BLD MANUAL STRIP-MCNC: 316 MG/DL (ref 74–99)

## 2025-01-14 PROCEDURE — 99231 SBSQ HOSP IP/OBS SF/LOW 25: CPT | Performed by: INTERNAL MEDICINE

## 2025-01-14 PROCEDURE — 82947 ASSAY GLUCOSE BLOOD QUANT: CPT

## 2025-01-14 PROCEDURE — 2500000001 HC RX 250 WO HCPCS SELF ADMINISTERED DRUGS (ALT 637 FOR MEDICARE OP): Performed by: INTERNAL MEDICINE

## 2025-01-14 PROCEDURE — 2500000002 HC RX 250 W HCPCS SELF ADMINISTERED DRUGS (ALT 637 FOR MEDICARE OP, ALT 636 FOR OP/ED): Performed by: INTERNAL MEDICINE

## 2025-01-14 RX ADMIN — ROSUVASTATIN CALCIUM 20 MG: 10 TABLET, FILM COATED ORAL at 09:24

## 2025-01-14 RX ADMIN — TAMSULOSIN HYDROCHLORIDE 0.4 MG: 0.4 CAPSULE ORAL at 09:24

## 2025-01-14 RX ADMIN — ASPIRIN 81 MG: 81 TABLET, COATED ORAL at 09:24

## 2025-01-14 RX ADMIN — SERTRALINE HYDROCHLORIDE 100 MG: 100 TABLET ORAL at 09:25

## 2025-01-14 RX ADMIN — INSULIN LISPRO 12 UNITS: 100 INJECTION, SOLUTION INTRAVENOUS; SUBCUTANEOUS at 12:31

## 2025-01-14 RX ADMIN — BUSPIRONE HYDROCHLORIDE 10 MG: 10 TABLET ORAL at 09:30

## 2025-01-14 RX ADMIN — LISINOPRIL 2.5 MG: 5 TABLET ORAL at 09:24

## 2025-01-14 RX ADMIN — LEVETIRACETAM 500 MG: 500 TABLET, FILM COATED ORAL at 09:24

## 2025-01-14 RX ADMIN — CIPROFLOXACIN 500 MG: 500 TABLET ORAL at 09:25

## 2025-01-14 ASSESSMENT — PAIN - FUNCTIONAL ASSESSMENT
PAIN_FUNCTIONAL_ASSESSMENT: 0-10
PAIN_FUNCTIONAL_ASSESSMENT: 0-10

## 2025-01-14 ASSESSMENT — PAIN SCALES - GENERAL
PAINLEVEL_OUTOF10: 3
PAINLEVEL_OUTOF10: 0 - NO PAIN

## 2025-01-14 NOTE — HH CARE COORDINATION
This referral has been made a Non Admit with  Home Care due to Patient Declines Home Care . If you have further questions, feel free to reach out to our office at [phone]. Thank you, [facility] Intake.

## 2025-01-14 NOTE — PROGRESS NOTES
"                                               Patient Name: Albert Abbott   YOB: 1950    Subjective:  Pt awake laying in bed, he has no specific complaints at this time.     Objective:    /58   Pulse 58   Temp 35.5 °C (95.9 °F) (Temporal)   Resp 18   Ht 1.803 m (5' 10.98\")   Wt 104 kg (229 lb 4.5 oz)   SpO2 97%   BMI 31.99 kg/m²     Physical Exam:    GEN: Reliable historian. Well developed, not in acute distress.   HEENT: Normocephalic and atraumatic.  Mucous membranes moist.  Clear sclera, PERRL, EOMI.  CARDIO: Regular rate and rhythm.  No murmurs, rubs, or gallops. No LE edema  RESP: Clear to auscultation b/l. No wheezes, rales or rhonchi. Good respiratory effort.   ABD: +BS x4, soft, non-tender. Not distended. No rebound or guarding.  MSK: Grossly normal inspection. No joint swelling or obvious deformities. ROM intact  NEURO: Moves all four extremities   SKIN: Warm and dry, no lesions, no rashes.  PSYCH: Confused at times     Scheduled medications  aspirin, 81 mg, oral, Daily  busPIRone, 10 mg, oral, BID  ciprofloxacin, 500 mg, oral, BID  enoxaparin, 40 mg, subcutaneous, q24h  insulin glargine, 5 Units, subcutaneous, Nightly  insulin lispro, 0-15 Units, subcutaneous, Before meals & nightly  levETIRAcetam, 500 mg, oral, BID  lidocaine, 1 patch, transdermal, Daily  lisinopril, 2.5 mg, oral, Daily  lubricating eye drops, 1 drop, Both Eyes, Nightly  melatonin, 3 mg, oral, Nightly  risperiDONE, 1 mg, oral, Nightly  rosuvastatin, 20 mg, oral, Daily  sertraline, 100 mg, oral, BID  tamsulosin, 0.4 mg, oral, Daily      Continuous medications     PRN medications  PRN medications: acetaminophen **OR** acetaminophen **OR** acetaminophen, alum-mag hydroxide-simeth, haloperidol lactate, LORazepam, polyethylene glycol     Assessment and Plan:  Albert Abbott is a 74 y.o. male     # Unwitnessed fall, syncope  # Non MI troponin elevation  # DM2 with hyperglycemia  # UTI, acute urinary " retention  # Acute metabolic encephalopathy  # Seizures  # HTN  # HLD  # GERD  # BPH  # COPD  # Dementia    Patient to continue Ciprofloxacin at discharge to treat UTI  Grover removed with TOV.  Declined SNF and HHC.  Medically stable for discharge home.    Eduardo Villeda DO  Senior Attending Physician  [facility] Medicine  Clinical Instructor

## 2025-01-14 NOTE — CARE PLAN
The patient's goals for the shift include      The clinical goals for the shift include maintain safety and comfort      Problem: Skin  Goal: Prevent/manage excess moisture  Outcome: Progressing

## 2025-01-14 NOTE — CARE PLAN
The patient's goals for the shift include      The clinical goals for the shift include maintain safety and comfort      Problem: Pain - Adult  Goal: Verbalizes/displays adequate comfort level or baseline comfort level  Outcome: Progressing     Problem: Safety - Adult  Goal: Free from fall injury  Outcome: Progressing     Problem: Discharge Planning  Goal: Discharge to home or other facility with appropriate resources  Outcome: Progressing     Problem: Fall/Injury  Goal: Not fall by end of shift  Outcome: Progressing

## 2025-01-14 NOTE — PROGRESS NOTES
Spoke with spouse and again refusing hhc, also offered [facility] and refused, per spouse pt is very stubborn and he will not participate with therapy, spouse asking for aide services, list provided to her.  Carmen Underwood RN [facility]

## 2025-01-14 NOTE — NURSING NOTE
1315 reviewed dc paperwork with pt and wife. Provided private duty list to wife. Answered all questions.   1331 pt left unit via wheelchair with all belongings in stable condition.

## 2025-01-14 NOTE — CARE PLAN
Problem: Skin  Goal: Prevent/manage excess moisture  1/14/2025 0053 by Joe Cook RN  Flowsheets (Taken 1/14/2025 0053)  Prevent/manage excess moisture: Moisturize dry skin  1/14/2025 0051 by Joe Cook RN  Outcome: Progressing   The patient's goals for the shift include      The clinical goals for the shift include maintain safety and comfort

## 2025-01-16 ENCOUNTER — PATIENT OUTREACH (OUTPATIENT)
Dept: PRIMARY CARE | Facility: CLINIC | Age: 75
End: 2025-01-16
Payer: MEDICARE

## 2025-01-16 NOTE — PROGRESS NOTES
Discharge Facility: [facility]  Discharge Diagnosis: Unwitnessed fall/syncope, elevated troponin, hyperglycemia, UTI, acute metabolic encephalopathy  Admission Date: 1/10/2025  Discharge Date: 1/14/2025    PCP Appointment Date:  -Not scheduled d/t no contact; task sent to office clerical pool to assist with follow up    Hospital Encounter and Summary Linked: Yes    Unable to reach patient x2 attempts, voicemail left with call back number.

## 2025-01-28 ENCOUNTER — PATIENT OUTREACH (OUTPATIENT)
Dept: PRIMARY CARE | Facility: CLINIC | Age: 75
End: 2025-01-28
Payer: MEDICARE

## 2025-02-25 ENCOUNTER — APPOINTMENT (OUTPATIENT)
Dept: PRIMARY CARE | Facility: CLINIC | Age: 75
End: 2025-02-25
Payer: MEDICARE

## 2025-02-25 VITALS
HEIGHT: 71 IN | WEIGHT: 237 LBS | BODY MASS INDEX: 33.18 KG/M2 | OXYGEN SATURATION: 97 % | HEART RATE: 56 BPM | DIASTOLIC BLOOD PRESSURE: 50 MMHG | SYSTOLIC BLOOD PRESSURE: 118 MMHG

## 2025-02-25 DIAGNOSIS — F33.1 MODERATE EPISODE OF RECURRENT MAJOR DEPRESSIVE DISORDER: Primary | ICD-10-CM

## 2025-02-25 PROCEDURE — 1123F ACP DISCUSS/DSCN MKR DOCD: CPT | Performed by: INTERNAL MEDICINE

## 2025-02-25 PROCEDURE — 1036F TOBACCO NON-USER: CPT | Performed by: INTERNAL MEDICINE

## 2025-02-25 PROCEDURE — 1158F ADVNC CARE PLAN TLK DOCD: CPT | Performed by: INTERNAL MEDICINE

## 2025-02-25 PROCEDURE — 99212 OFFICE O/P EST SF 10 MIN: CPT | Performed by: INTERNAL MEDICINE

## 2025-02-25 PROCEDURE — 1159F MED LIST DOCD IN RCRD: CPT | Performed by: INTERNAL MEDICINE

## 2025-02-25 ASSESSMENT — ENCOUNTER SYMPTOMS
DYSPHORIC MOOD: 0
DEPRESSION: 1

## 2025-02-25 ASSESSMENT — PATIENT HEALTH QUESTIONNAIRE - PHQ9
10. IF YOU CHECKED OFF ANY PROBLEMS, HOW DIFFICULT HAVE THESE PROBLEMS MADE IT FOR YOU TO DO YOUR WORK, TAKE CARE OF THINGS AT HOME, OR GET ALONG WITH OTHER PEOPLE: SOMEWHAT DIFFICULT
SUM OF ALL RESPONSES TO PHQ9 QUESTIONS 1 AND 2: 2
1. LITTLE INTEREST OR PLEASURE IN DOING THINGS: SEVERAL DAYS
2. FEELING DOWN, DEPRESSED OR HOPELESS: SEVERAL DAYS

## 2025-02-25 NOTE — PROGRESS NOTES
"Subjective   Patient ID: Albert Abbott \"Taras\" is a 74 y.o. male who presents for Follow-up.    [facility]. Is looking forward to seeing her again and he feels like he is ultimately on the mend.    Pt occasionally getting dizzy moving around.    Pt states his wife is in charge of his medications so she made sure he got them.      Review of Systems   Psychiatric/Behavioral:  Negative for dysphoric mood.        /50 (BP Location: Right arm, Patient Position: Sitting)   Pulse 56   Ht 1.803 m (5' 10.98\")   Wt 108 kg (237 lb)   SpO2 97%   BMI 33.07 kg/m²   Objective   Physical Exam  Constitutional:       General: He is not in acute distress.     Appearance: He is obese. He is not ill-appearing, toxic-appearing or diaphoretic.   HENT:      Head: Normocephalic and atraumatic.   Neurological:      Mental Status: He is alert.       Assessment/Plan   Problem List Items Addressed This Visit    None  Visit Diagnoses         Codes    Moderate episode of recurrent major depressive disorder    -  Primary F33.1        -This is the happiest and most cheerful I've seen pt since meeting him. He seems very happy with [facility]. Encouraged to keep this up. Pt tried to call his wife but she didn't answer for us to see if she had any concerns at home. Encouraged pt to have his wife go to the geriatrics appt next week and to a MAW visit we will do his next time here in 3 months. Pt agreeable. All questions answered.  -HR in 30s when first checked, but on repeat check is up to the 50s. Likely from psychiatric medications.         Tani Morrison MD 02/25/25 2:44 PM   "

## 2025-03-05 ENCOUNTER — OFFICE VISIT (OUTPATIENT)
Dept: GERIATRIC MEDICINE | Facility: CLINIC | Age: 75
End: 2025-03-05
Payer: MEDICARE

## 2025-03-05 ENCOUNTER — APPOINTMENT (OUTPATIENT)
Dept: GERIATRIC MEDICINE | Facility: CLINIC | Age: 75
End: 2025-03-05
Payer: MEDICARE

## 2025-03-05 ENCOUNTER — SOCIAL WORK (OUTPATIENT)
Dept: GERIATRIC MEDICINE | Facility: CLINIC | Age: 75
End: 2025-03-05
Payer: MEDICARE

## 2025-03-05 ENCOUNTER — TELEPHONE (OUTPATIENT)
Dept: GERIATRIC MEDICINE | Facility: CLINIC | Age: 75
End: 2025-03-05

## 2025-03-05 VITALS
WEIGHT: 240.9 LBS | TEMPERATURE: 96.8 F | HEART RATE: 72 BPM | RESPIRATION RATE: 20 BRPM | SYSTOLIC BLOOD PRESSURE: 140 MMHG | DIASTOLIC BLOOD PRESSURE: 60 MMHG | BODY MASS INDEX: 33.62 KG/M2

## 2025-03-05 DIAGNOSIS — M94.9 CARTILAGE DISORDER: ICD-10-CM

## 2025-03-05 DIAGNOSIS — F33.1 MDD (MAJOR DEPRESSIVE DISORDER), RECURRENT EPISODE, MODERATE: ICD-10-CM

## 2025-03-05 DIAGNOSIS — R41.3 MEMORY LOSS: ICD-10-CM

## 2025-03-05 DIAGNOSIS — E11.9 TYPE 2 DIABETES MELLITUS WITHOUT COMPLICATION, WITHOUT LONG-TERM CURRENT USE OF INSULIN (MULTI): Primary | ICD-10-CM

## 2025-03-05 PROCEDURE — 1160F RVW MEDS BY RX/DR IN RCRD: CPT | Performed by: INTERNAL MEDICINE

## 2025-03-05 PROCEDURE — 1125F AMNT PAIN NOTED PAIN PRSNT: CPT | Performed by: INTERNAL MEDICINE

## 2025-03-05 PROCEDURE — 1159F MED LIST DOCD IN RCRD: CPT | Performed by: INTERNAL MEDICINE

## 2025-03-05 PROCEDURE — 1170F FXNL STATUS ASSESSED: CPT | Performed by: INTERNAL MEDICINE

## 2025-03-05 PROCEDURE — 99204 OFFICE O/P NEW MOD 45 MIN: CPT | Performed by: INTERNAL MEDICINE

## 2025-03-05 PROCEDURE — 99214 OFFICE O/P EST MOD 30 MIN: CPT | Performed by: INTERNAL MEDICINE

## 2025-03-05 PROCEDURE — G2211 COMPLEX E/M VISIT ADD ON: HCPCS | Performed by: INTERNAL MEDICINE

## 2025-03-05 PROCEDURE — 1036F TOBACCO NON-USER: CPT | Performed by: INTERNAL MEDICINE

## 2025-03-05 RX ORDER — PIOGLITAZONEHYDROCHLORIDE 45 MG/1
45 TABLET ORAL DAILY
COMMUNITY

## 2025-03-05 RX ORDER — PERPHENAZINE/AMITRIPTYLINE HCL 4 MG-25 MG
1 TABLET ORAL DAILY
COMMUNITY

## 2025-03-05 RX ORDER — MULTIVIT-MIN/IRON FUM/FOLIC AC 7.5 MG-4
1 TABLET ORAL DAILY
COMMUNITY

## 2025-03-05 RX ORDER — TAMSULOSIN HYDROCHLORIDE 0.4 MG/1
0.4 CAPSULE ORAL DAILY
COMMUNITY

## 2025-03-05 ASSESSMENT — ENCOUNTER SYMPTOMS
OCCASIONAL FEELINGS OF UNSTEADINESS: 1
LOSS OF SENSATION IN FEET: 0
DEPRESSION: 0

## 2025-03-05 ASSESSMENT — MONTREAL COGNITIVE ASSESSMENT (MOCA)
WHAT LEVEL OF EDUCATION WAS ATTAINED: 0
12. MEMORY INDEX SCORE: 2
WHAT IS THE TOTAL SCORE (OUT OF 30): 20
6. READ LIST OF DIGITS [FORWARD/BACKWARD]: 1
7. [VIGILENCE] TAP WHEN HEARING DESIGNATED LETTER: 1
9. REPEAT EACH SENTENCE: 0
5. MEMORY TRIALS: 0
VISUOSPATIAL/EXECUTIVE SUBSCORE: 3
4. NAME EACH OF THE THREE ANIMALS SHOWN: 3
8. SERIAL SUBTRACTION OF 7S: 3
10. [FLUENCY] NAME WORDS STARTING WITH DESIGNATED LETTER: 0
11. FOR EACH PAIR OF WORDS, WHAT CATEGORY DO THEY BELONG TO (OUT OF 2): 1
13. ORIENTATION SUBSCORE: 6

## 2025-03-05 ASSESSMENT — ACTIVITIES OF DAILY LIVING (ADL)
JUDGMENT_ADEQUATE_SAFELY_COMPLETE_DAILY_ACTIVITIES: YES
STILL_DRIVING: NO
PREPARING_MEALS: TOTAL CARE
PATIENT'S MEMORY ADEQUATE TO SAFELY COMPLETE DAILY ACTIVITIES?: YES
USING_TRANSPORTATION: TOTAL CARE
USING_TELEPHONE: NEEDS ASSISTANCE
PILL_BOX_USED: YES
TOILETING: INDEPENDENT
FEEDING YOURSELF: INDEPENDENT
ADEQUATE_TO_COMPLETE_ADL: YES
HEARING - LEFT EAR: DIFFICULTY WITH NOISE
EATING: INDEPENDENT
BATHING: NEEDS ASSISTANCE
MANAGING_FINANCES: TOTAL CARE
GROCERY_SHOPPING: TOTAL CARE
GROOMING: INDEPENDENT
DRESSING YOURSELF: INDEPENDENT
DOING_HOUSEWORK: NEEDS ASSISTANCE
TAKING_MEDICATION: NEEDS ASSISTANCE
WALKS IN HOME: INDEPENDENT
HEARING - RIGHT EAR: DIFFICULTY WITH NOISE
NEEDS_ASSISTANCE_WITH_FOOD: INDEPENDENT

## 2025-03-05 ASSESSMENT — GERIATRIC DEPRESSION SCALE SHORT VERSION (GDS-SV)
DO YOU THINK THAT MOST PEOPLE ARE BETTER OFF THAN YOU ARE: NO
ARE YOU BASICALLY SATISFIED WITH YOUR LIFE: YES
GDS TOTAL SCORE: 4
DO YOU OFTEN FEEL HELPLESS: NO
DO YOU FEEL THAT YOUR LIFE IS EMPTY: NO
DO YOU PREFER TO STAY AT HOME, RATHER THAN GOING OUT AND DOING NEW THINGS: NO
HAVE YOU DROPPED MANY OF YOUR ACTIVITIES AND INTERESTS?: YES
DO YOU FEEL YOU HAVE MORE PROBLEMS WITH MEMORY THAN MOST: YES
DO YOU FEEL PRETTY WORTHLESS THE WAY YOU ARE NOW: NO
ARE YOU IN GOOD SPIRITS MOST OF THE TIME: YES
DO YOU OFTEN GET BORED: YES
ARE YOU AFRAID THAT SOMETHING BAD IS GOING TO HAPPEN TO YOU: NO
DO YOU FEEL HAPPY MOST OF THE TIME: YES
DO YOU FEEL THAT YOUR SITUATION IS HOPELESS: NO
DO YOU THINK IT IS WONDERFUL TO BE ALIVE NOW: YES
DO YOU FEEL FULL OF ENERGY: NO

## 2025-03-05 ASSESSMENT — PAIN SCALES - GENERAL: PAINLEVEL_OUTOF10: 6
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
## 2025-03-05 NOTE — PATIENT INSTRUCTIONS
Blood work is done today. We will let you know immediately if there are any abnormalities in the blood work. Otherwise, we will discuss it on next visit.   Follow-up in 4-6 weeks VIRTUALLY for Summary conference (one-hour visit) with me and  and you and your wife,

## 2025-03-05 NOTE — TELEPHONE ENCOUNTER
Please reconcile meds with wife. Please let wife know that we are setting up VIRTUAL summary conference with me, , pt and her. Please emphasize to wife that it is important for her to be present during the visit as I need to gather hx from her as well. JACKIE to RAGINI.    Kindly let me know once medication reconciliation is done.

## 2025-03-05 NOTE — PROGRESS NOTES
"Patient ID: Albert Abbott \"Bill\" is a 74 y.o. male who presents for cognitive impairment, establish care with geriatrician. Pt has a history of seizures, dementia, and mental health concerns. Pt was in the ER in 10/24 for altered mental status and more recently for a seizure and fall. Pt [facility] \"[facility]\" [facility]; he said she calls him and she comes to his house every few weeks. 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 Pt was referred by Dr. Awais Morrison and has frequent falls and seizures.    Identifying Information                              : 1950  Assessment date: 3/5/2025    Patient accompanied by:  alone     History provided by: self    Symptoms Reported (include length of time): memory loss (pt said he noticed \"since he retired\" in ), anxiety and depression (for many years), anger and agitation, hearing and vision problems,     CONCERNS IDENTIFIED BY SOCIAL WORK (Safety Risks, Health Issues, Advanced Directives not completed, etc)     1. Extensive mental health history of PTSD, anxiety, and depression  2. Came alone to appointment; he said wife Danuta does not like driving on highways and she was unable to come on senior transit service  3. Small social [place]; has formal supports but lacks informal support    Social History    Level of Education: some college, 2 years of college  Occupation/Work Status: Retired  from [employer] for 30+ years     nursing home date (if applicable): 2004     On any form of disability? yes If so, explain:   Marital Status:                         Social History     Socioeconomic History    Marital status:    Tobacco Use    Smoking status: Former     Current packs/day: 0.00     Average packs/day: 2.0 packs/day for 31.0 years (62.0 ttl pk-yrs)     Types: Cigarettes     Start date: 1963     Quit date:      Years since " [email or web address]    Smokeless tobacco: Never   Substance and Sexual Activity    Alcohol use: Not Currently     Comment: Quit drinking in the 90s.    Drug use: Never    Sexual activity: Not Currently     Partners: Female     Social Drivers of Health     Financial Resource Strain: Low Risk  (2025)    Overall Financial Resource Strain (CARDIA)     Difficulty of Paying Living Expenses: Not hard at all   Food Insecurity: No Food Insecurity (2025)    Hunger Vital Sign     Worried About Running Out of Food in the Last Year: Never true     Ran Out of Food in the Last Year: Never true   Transportation Needs: No Transportation Needs (2025)    PRAPARE - Transportation     Lack of Transportation (Medical): No     Lack of Transportation (Non-Medical): No   Physical Activity: Inactive (10/28/2024)    Exercise Vital Sign     Days of Exercise per Week: 0 days     Minutes of Exercise per Session: 0 min   Stress: No Stress Concern Present (10/28/2024)    [place] [place] [employer] - Occupational Stress Questionnaire     Feeling of Stress : Not at all   Social Connections: Socially Integrated (10/28/2024)    Social Connection and Isolation Panel [NHANES]     Frequency of Communication with Friends and Family: Three times a week     Frequency of Social Gatherings with Friends and Family: Three times a week     Attends [employer] Services: More than 4 times per year     Active Member of Clubs or Organizations: Yes     Attends Club or Organization Meetings: 1 to 4 times per year     Marital Status:    Intimate Partner Violence: Patient Unable To Answer (2025)    Humiliation, Afraid, Rape, and Kick questionnaire     Fear of Current or Ex-Partner: Patient unable to answer     Emotionally Abused: Patient unable to answer     Physically Abused: Patient unable to answer     Sexually Abused: Patient unable to answer   Housing Stability: Low Risk  (2025)    Housing Stability Vital Sign     Unable to  Pay for Housing in the Last Year: No     Number of Times Moved in the Last Year: 0     Homeless in the Last Year: No   Recent Concern: Housing Stability - High Risk (10/28/2024)    Housing Stability Vital Sign     Unable to Pay for Housing in the Last Year: Yes     Number of Times Moved in the Last Year: 0     Homeless in the Last Year: No        ENCOUNTER SCREENING RESULTS  MOCA Total Score: 20       Geriatric Depression Scale (Short Version) Total: 4    Advance Directives/Legal/Financial    Patient Healthcare POA:  Yes, wife Danuta is POA         Is Healthcare POA currently scanned into patient's chart: Will be scanned into chart asap. Received at this appt.      DPOA for finances:  wife Aga Frias        Legal guardian:  NA     Living Will: yes     Any form of disability? yes Type:     [place]? yes       Significant financial stressors: None reported.    Family History      Biological Mother(status, age at death, cause of death): Pt's mother passed away at 49 yo and  of lung cancer. Pt says she was a smoker and may have had a nervous breakdown.      Biological Father(status, age at death, cause of death): Pt's father passed away of leukemia.      Biological Siblings(status, age at death, cause of death): Pt said he has some step-siblings and one  at a young age.      Extended family members with relevant health history: Pt said maternal grandmother had diabetes.     Supportive Relationships (Informal Support)     Spouse/partner information (include length of relationship, health status): Pt is  to 3rd wife Danuta for 40 years,  twice before for 3-4 years each. Pt said wife is retired banker.      Children Information (include location, level of engagement):  Pt had no children of his own. Pt has step-daughter (child of Danuta's) who is a  and lives nearby.      Other social supports: Pt attends a combat veterans support group 2x/month, [employer] (but not active now)     Living  "situation: Lives in home with wife    Formal Supports     Engaged community services (Emergency Alert, HHA, MOW, Case Management, Etc.):  senior transportation service, PT/OT, cleaning service once every 2 weeks,      Mental Health     Observed Mood: Normal     Observed Affect:calm and pleasant     Current mental health symptoms/diagnosis/treatment (include medications):  anxiety, depression, PTSD (does take meds for them)     Relevant Loss/Grief:NA     Relevant mental health history:  [facility].      Self-harm/suicidal thoughts, plan: None Reported     Alcohol, illicit drug, and tobacco use reported by patient/family: Pt said he quite drinking \"at least 10 years ago.\"      Any addiction history:  Smoked 2 packs/day for 31 years but quit in 1994.     Interests/Hobbies/Activities/Daily Routine: Pt enjoys his three dogs (has a dogwalker now), game shows, watching TV (westerns, news, war movies and series)       Additional Notes or Follow-up Matters: NA  "
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
## 2025-03-06 LAB
25(OH)D3+25(OH)D2 SERPL-MCNC: 42 NG/ML (ref 30–100)
ALBUMIN SERPL-MCNC: 4.6 G/DL (ref 3.6–5.1)
ALP SERPL-CCNC: 53 U/L (ref 35–144)
ALT SERPL-CCNC: 14 U/L (ref 9–46)
ANION GAP SERPL CALCULATED.4IONS-SCNC: 9 MMOL/L (CALC) (ref 7–17)
AST SERPL-CCNC: 17 U/L (ref 10–35)
BILIRUB SERPL-MCNC: 0.5 MG/DL (ref 0.2–1.2)
BUN SERPL-MCNC: 23 MG/DL (ref 7–25)
CALCIUM SERPL-MCNC: 9.4 MG/DL (ref 8.6–10.3)
CHLORIDE SERPL-SCNC: 104 MMOL/L (ref 98–110)
CO2 SERPL-SCNC: 23 MMOL/L (ref 20–32)
CREAT SERPL-MCNC: 0.89 MG/DL (ref 0.7–1.28)
EGFRCR SERPLBLD CKD-EPI 2021: 89 ML/MIN/1.73M2
ERYTHROCYTE [DISTWIDTH] IN BLOOD BY AUTOMATED COUNT: 12.9 % (ref 11–15)
GLUCOSE SERPL-MCNC: 175 MG/DL (ref 65–99)
HCT VFR BLD AUTO: 34.4 % (ref 38.5–50)
HGB BLD-MCNC: 11.2 G/DL (ref 13.2–17.1)
MCH RBC QN AUTO: 30.8 PG (ref 27–33)
MCHC RBC AUTO-ENTMCNC: 32.6 G/DL (ref 32–36)
MCV RBC AUTO: 94.5 FL (ref 80–100)
PLATELET # BLD AUTO: 189 THOUSAND/UL (ref 140–400)
PMV BLD REES-ECKER: 12.1 FL (ref 7.5–12.5)
POTASSIUM SERPL-SCNC: 4.4 MMOL/L (ref 3.5–5.3)
PROT SERPL-MCNC: 6.6 G/DL (ref 6.1–8.1)
RBC # BLD AUTO: 3.64 MILLION/UL (ref 4.2–5.8)
SODIUM SERPL-SCNC: 136 MMOL/L (ref 135–146)
TSH SERPL-ACNC: 1.15 MIU/L (ref 0.4–4.5)
VIT B12 SERPL-MCNC: 358 PG/ML (ref 200–1100)
WBC # BLD AUTO: 5.5 THOUSAND/UL (ref 3.8–10.8)

## 2025-05-30 ENCOUNTER — APPOINTMENT (OUTPATIENT)
Dept: PRIMARY CARE | Facility: CLINIC | Age: 75
End: 2025-05-30
Payer: MEDICARE

## 2025-05-30 VITALS
DIASTOLIC BLOOD PRESSURE: 36 MMHG | HEIGHT: 70 IN | SYSTOLIC BLOOD PRESSURE: 91 MMHG | BODY MASS INDEX: 31.71 KG/M2 | OXYGEN SATURATION: 98 % | WEIGHT: 221.5 LBS | HEART RATE: 40 BPM | RESPIRATION RATE: 12 BRPM

## 2025-05-30 DIAGNOSIS — E11.40 TYPE 2 DIABETES MELLITUS WITH DIABETIC NEUROPATHY, WITHOUT LONG-TERM CURRENT USE OF INSULIN: ICD-10-CM

## 2025-05-30 DIAGNOSIS — Z71.89 GOALS OF CARE, COUNSELING/DISCUSSION: ICD-10-CM

## 2025-05-30 DIAGNOSIS — Z00.00 MEDICARE ANNUAL WELLNESS VISIT, SUBSEQUENT: Primary | ICD-10-CM

## 2025-05-30 DIAGNOSIS — R56.9 SEIZURE (MULTI): ICD-10-CM

## 2025-05-30 DIAGNOSIS — I10 ESSENTIAL HYPERTENSION: ICD-10-CM

## 2025-05-30 DIAGNOSIS — E78.5 DYSLIPIDEMIA: ICD-10-CM

## 2025-05-30 PROBLEM — G47.33 OBSTRUCTIVE SLEEP APNEA OF ADULT: Status: ACTIVE | Noted: 2025-05-30

## 2025-05-30 PROBLEM — H40.053 OCULAR HYPERTENSION, BILATERAL: Status: ACTIVE | Noted: 2025-05-30

## 2025-05-30 PROBLEM — Z86.79 HISTORY OF HYPERTENSION: Status: ACTIVE | Noted: 2025-05-30

## 2025-05-30 PROBLEM — E66.9 OBESITY: Status: ACTIVE | Noted: 2025-05-30

## 2025-05-30 PROBLEM — G89.29 CHRONIC PAIN: Status: ACTIVE | Noted: 2025-05-30

## 2025-05-30 PROBLEM — B35.1 ONYCHOMYCOSIS: Status: ACTIVE | Noted: 2025-05-30

## 2025-05-30 PROBLEM — H93.8X3 SENSATION OF FULLNESS IN BOTH EARS: Status: ACTIVE | Noted: 2025-05-30

## 2025-05-30 PROBLEM — R00.2 PALPITATIONS: Status: ACTIVE | Noted: 2025-05-30

## 2025-05-30 PROBLEM — K80.20 GALLSTONE: Status: ACTIVE | Noted: 2025-05-30

## 2025-05-30 PROBLEM — E11.9 TYPE 2 DIABETES MELLITUS WITHOUT COMPLICATIONS: Status: ACTIVE | Noted: 2025-05-30

## 2025-05-30 PROBLEM — I80.9 SUPERFICIAL THROMBOPHLEBITIS: Status: ACTIVE | Noted: 2023-01-06

## 2025-05-30 PROBLEM — F03.90 UNSPECIFIED DEMENTIA, UNSPECIFIED SEVERITY, WITHOUT BEHAVIORAL DISTURBANCE, PSYCHOTIC DISTURBANCE, MOOD DISTURBANCE, AND ANXIETY: Status: ACTIVE | Noted: 2025-05-30

## 2025-05-30 PROBLEM — R26.9 ABNORMAL GAIT: Status: ACTIVE | Noted: 2025-05-30

## 2025-05-30 PROBLEM — Z86.39 HISTORY OF DIABETES MELLITUS: Status: ACTIVE | Noted: 2025-05-30

## 2025-05-30 PROBLEM — E11.3319: Status: ACTIVE | Noted: 2025-05-30

## 2025-05-30 PROBLEM — F32.A DEPRESSIVE DISORDER: Status: ACTIVE | Noted: 2025-05-30

## 2025-05-30 PROBLEM — G62.9 NEUROPATHY: Status: ACTIVE | Noted: 2025-05-30

## 2025-05-30 PROBLEM — H61.23 BILATERAL IMPACTED CERUMEN: Status: ACTIVE | Noted: 2025-05-30

## 2025-05-30 PROBLEM — R80.9 MICROALBUMINURIA: Status: ACTIVE | Noted: 2025-05-30

## 2025-05-30 PROBLEM — F41.1 GENERALIZED ANXIETY DISORDER: Status: ACTIVE | Noted: 2025-05-30

## 2025-05-30 PROBLEM — Z98.890 HISTORY OF COLONOSCOPY: Status: ACTIVE | Noted: 2025-05-30

## 2025-05-30 PROBLEM — L84 CORNS AND CALLOSITIES: Status: ACTIVE | Noted: 2025-05-30

## 2025-05-30 PROBLEM — L84 CALLUS OF TOE: Status: ACTIVE | Noted: 2025-05-30

## 2025-05-30 PROBLEM — E55.9 VITAMIN D DEFICIENCY: Status: ACTIVE | Noted: 2025-05-30

## 2025-05-30 PROBLEM — R06.09 EXERTIONAL DYSPNEA: Status: ACTIVE | Noted: 2025-05-30

## 2025-05-30 PROCEDURE — 1036F TOBACCO NON-USER: CPT | Performed by: INTERNAL MEDICINE

## 2025-05-30 PROCEDURE — 3078F DIAST BP <80 MM HG: CPT | Performed by: INTERNAL MEDICINE

## 2025-05-30 PROCEDURE — G0439 PPPS, SUBSEQ VISIT: HCPCS | Performed by: INTERNAL MEDICINE

## 2025-05-30 PROCEDURE — 1157F ADVNC CARE PLAN IN RCRD: CPT | Performed by: INTERNAL MEDICINE

## 2025-05-30 PROCEDURE — 1159F MED LIST DOCD IN RCRD: CPT | Performed by: INTERNAL MEDICINE

## 2025-05-30 PROCEDURE — 99213 OFFICE O/P EST LOW 20 MIN: CPT | Performed by: INTERNAL MEDICINE

## 2025-05-30 PROCEDURE — 3074F SYST BP LT 130 MM HG: CPT | Performed by: INTERNAL MEDICINE

## 2025-05-30 PROCEDURE — 1170F FXNL STATUS ASSESSED: CPT | Performed by: INTERNAL MEDICINE

## 2025-05-30 RX ORDER — VIT C/E/ZN/COPPR/LUTEIN/ZEAXAN 250MG-90MG
50 CAPSULE ORAL DAILY
COMMUNITY

## 2025-05-30 RX ORDER — GLIPIZIDE 5 MG/1
5 TABLET ORAL
COMMUNITY

## 2025-05-30 ASSESSMENT — ENCOUNTER SYMPTOMS
OCCASIONAL FEELINGS OF UNSTEADINESS: 1
SHORTNESS OF BREATH: 0
LOSS OF SENSATION IN FEET: 1
BLOOD IN STOOL: 0
CONSTIPATION: 0
DECREASED CONCENTRATION: 1
SLEEP DISTURBANCE: 0
FATIGUE: 1
HEADACHES: 0
DIZZINESS: 1
DEPRESSION: 0

## 2025-05-30 ASSESSMENT — ACTIVITIES OF DAILY LIVING (ADL)
GROCERY_SHOPPING: INDEPENDENT
DRESSING: INDEPENDENT
MANAGING_FINANCES: NEEDS ASSISTANCE
TAKING_MEDICATION: INDEPENDENT
DOING_HOUSEWORK: INDEPENDENT
BATHING: INDEPENDENT

## 2025-05-30 ASSESSMENT — PATIENT HEALTH QUESTIONNAIRE - PHQ9
2. FEELING DOWN, DEPRESSED OR HOPELESS: NOT AT ALL
1. LITTLE INTEREST OR PLEASURE IN DOING THINGS: NOT AT ALL
SUM OF ALL RESPONSES TO PHQ9 QUESTIONS 1 AND 2: 0

## 2025-05-30 NOTE — PATIENT INSTRUCTIONS
Please remember you need to be fasting when you obtain your labwork. That means nothing to eat or drink for 8-12 hours (water or black coffee are OK though).    We are stopping your lisinopril today since blood pressure remains low. This is likely a result of this blood pressure medication and the psychiatric medications we are taking.

## 2025-05-30 NOTE — PROGRESS NOTES
"Subjective   Reason for Visit: Albert Abbott is an 74 y.o. male here for a Medicare Wellness visit.     Past Medical, Surgical, and Family History reviewed and updated in chart.    Reviewed all medications by prescribing practitioner or clinical pharmacist (such as prescriptions, OTCs, herbal therapies and supplements) and documented in the medical record.    Pt here for MAW. Came by himself today.    States that the frequent rain recently reminds him of monsoon season from [place] which has been more distressing. Otherwise feels like he has been doing better.    Last A1c a month ago was 7.0%. Glyburide stopped afterwards.    No seizures in the last year.        Patient Care Team:  Tani Morrison MD as PCP - General (Internal Medicine)  DEIDRE Raymond-CNP as Referring Physician (Internal Medicine)  Rodrigo Nguyen DO as Consulting Physician (Internal Medicine)  Karoline Lockwood MD as Consulting Physician (Gerontology)     Pt is not considered to be at high risk of opioid abuse after reviewing chart.    Review of Systems   Constitutional:  Positive for fatigue.   Respiratory:  Negative for shortness of breath.    Cardiovascular:  Negative for chest pain.   Gastrointestinal:  Negative for blood in stool and constipation.   Neurological:  Positive for dizziness. Negative for headaches.   Psychiatric/Behavioral:  Positive for decreased concentration. Negative for sleep disturbance.        Objective   Vitals:  BP (!) 91/36 (BP Location: Left arm, Patient Position: Sitting)   Pulse (!) 40   Resp 12   Ht 1.772 m (5' 9.75\")   Wt 100 kg (221 lb 8 oz)   SpO2 98%   BMI 32.01 kg/m²       Physical Exam  Constitutional:       General: He is not in acute distress.     Appearance: He is not ill-appearing, toxic-appearing or diaphoretic.   HENT:      Head: Normocephalic and atraumatic.   Eyes:      Conjunctiva/sclera: Conjunctivae normal.   Cardiovascular:      Rate and Rhythm: Normal rate and regular " rhythm.      Heart sounds: No murmur heard.     No friction rub. No gallop.   Pulmonary:      Effort: Pulmonary effort is normal. No respiratory distress.      Breath sounds: No stridor. No wheezing, rhonchi or rales.   Neurological:      Mental Status: He is alert.         Assessment/Plan   Assessment & Plan  Medicare annual wellness visit, subsequent         Goals of care, counseling/discussion         Type 2 diabetes mellitus with diabetic neuropathy, without long-term current use of insulin  -[facility] Taking metformin 1g bid, glipizide 5mg bid, and januvia 100mg daily. Will continue current regimen. Pt notes regardless there is a provider at the [facility] managing his diabetes at this time.  Orders:    Albumin-Creatinine Ratio, Urine Random; Future    Dyslipidemia  -Taking crestor. Will recheck lvls.  Orders:    Lipid panel; Future    Seizure (Multi)  -Taking keppra 500mg bid. Last keppra lvl in January was low (4). Pt states he is taking it as prescribed. Will recheck lvls.  Orders:    Levetiracetam; Future    Essential hypertension  -BP remains low. Likely from psychiatric regimen. Last visit we decreased lisinopril from 5mg daily to 2.5mg daily. Will stop today d/t pt getting dizzy. He is agreeable. Note written in AVS for his wife to see afterwards.       -Bradycardia improved when listening to heart.  -Pt to see geriatrics in next month.  -Overall mentation best I have seen since meeting patient. He recognizes his limitations, but is moving with more purpose, has more energy, and is actually looking forward to things this summer (like going for walks). Encouraged to keep up the good work.    Advance Directives Discussion  Advanced Care Planning (including a Living Will, Healthcare POA, as well as specific end of life choices and/or directives), was discussed with the patient and/or surrogate, voluntarily, and details of that discussion documented in the Problem List (under Advanced Directives  Discussion) of the medical record.  Pt has a living will and HCPOA. This was updated last year. Pt doesn't recall who his alternative HCPOAs are after his wife. Pt confirms he still is DNR-DNI.   (~16 min spent discussing above)

## 2025-05-30 NOTE — ASSESSMENT & PLAN NOTE
-[facility]% [facility]. Taking metformin 1g bid, glipizide 5mg bid, and januvia 100mg daily. Will continue current regimen. Pt notes regardless there is a provider at the [facility] managing his diabetes at this time.  Orders:    Albumin-Creatinine Ratio, Urine Random; Future

## 2025-05-30 NOTE — ASSESSMENT & PLAN NOTE
-Taking keppra 500mg bid. Last keppra lvl in January was low (4). Pt states he is taking it as prescribed. Will recheck lvls.  Orders:    Levetiracetam; Future

## 2025-06-25 ENCOUNTER — TELEMEDICINE (OUTPATIENT)
Dept: GERIATRIC MEDICINE | Facility: CLINIC | Age: 75
End: 2025-06-25
Payer: MEDICARE

## 2025-06-25 DIAGNOSIS — K08.9 POOR DENTITION: ICD-10-CM

## 2025-06-25 DIAGNOSIS — R00.1 BRADYCARDIA: ICD-10-CM

## 2025-06-25 DIAGNOSIS — R63.4 ABNORMAL WEIGHT LOSS: ICD-10-CM

## 2025-06-25 DIAGNOSIS — Z86.79 HISTORY OF HYPERTENSION: ICD-10-CM

## 2025-06-25 DIAGNOSIS — F03.A0 MILD DEMENTIA WITHOUT BEHAVIORAL DISTURBANCE, PSYCHOTIC DISTURBANCE, MOOD DISTURBANCE, OR ANXIETY, UNSPECIFIED DEMENTIA TYPE: Primary | ICD-10-CM

## 2025-06-25 PROCEDURE — G2211 COMPLEX E/M VISIT ADD ON: HCPCS | Performed by: INTERNAL MEDICINE

## 2025-06-25 PROCEDURE — 1160F RVW MEDS BY RX/DR IN RCRD: CPT | Performed by: INTERNAL MEDICINE

## 2025-06-25 PROCEDURE — 1170F FXNL STATUS ASSESSED: CPT | Performed by: INTERNAL MEDICINE

## 2025-06-25 PROCEDURE — 99214 OFFICE O/P EST MOD 30 MIN: CPT | Performed by: INTERNAL MEDICINE

## 2025-06-25 PROCEDURE — 1159F MED LIST DOCD IN RCRD: CPT | Performed by: INTERNAL MEDICINE

## 2025-06-25 ASSESSMENT — ACTIVITIES OF DAILY LIVING (ADL)
USING_TELEPHONE: NEEDS ASSISTANCE
DOING_HOUSEWORK: NEEDS ASSISTANCE
DRESSING YOURSELF: NEEDS ASSISTANCE
WALKS IN HOME: NEEDS ASSISTANCE
TAKING_MEDICATION: TOTAL CARE
PILL_BOX_USED: NO
HEARING - RIGHT EAR: FUNCTIONAL
MANAGING_FINANCES: TOTAL CARE
EATING: INDEPENDENT
GROOMING: INDEPENDENT
USING_TRANSPORTATION: INDEPENDENT
JUDGMENT_ADEQUATE_SAFELY_COMPLETE_DAILY_ACTIVITIES: YES
ADEQUATE_TO_COMPLETE_ADL: YES
TOILETING: INDEPENDENT
PATIENT'S MEMORY ADEQUATE TO SAFELY COMPLETE DAILY ACTIVITIES?: YES
NEEDS_ASSISTANCE_WITH_FOOD: FOOD CUT
GROCERY_SHOPPING: TOTAL CARE
STILL_DRIVING: YES
BATHING: NEEDS ASSISTANCE
PREPARING_MEALS: NEEDS ASSISTANCE
FEEDING YOURSELF: INDEPENDENT
HEARING - LEFT EAR: FUNCTIONAL
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
## 2025-06-25 NOTE — Clinical Note
Hello,   Pls schedule with wife 6 month appointment for repeat MoCA and routine follow-up with me. Thanks!

## 2025-07-25 LAB
ALBUMIN/CREAT UR: 25 MG/G CREAT
CHOLEST SERPL-MCNC: 158 MG/DL
CHOLEST/HDLC SERPL: 2.5 (CALC)
CREAT UR-MCNC: 32 MG/DL (ref 20–320)
HDLC SERPL-MCNC: 63 MG/DL
LDLC SERPL CALC-MCNC: 79 MG/DL (CALC)
LEVETIRACETAM SERPL-MCNC: 22.8 MCG/ML
MICROALBUMIN UR-MCNC: 0.8 MG/DL
NONHDLC SERPL-MCNC: 95 MG/DL (CALC)
TRIGL SERPL-MCNC: 79 MG/DL

## 2025-07-29 PROBLEM — Z86.39 HISTORY OF DIABETES MELLITUS: Status: RESOLVED | Noted: 2025-05-30 | Resolved: 2025-07-29

## 2025-07-29 PROBLEM — I10 HYPERTENSION: Status: ACTIVE | Noted: 2025-05-30

## 2025-07-29 PROBLEM — G62.9 NEUROPATHY: Status: RESOLVED | Noted: 2025-05-30 | Resolved: 2025-07-29

## 2025-07-29 PROBLEM — E66.811 CLASS 1 OBESITY WITH BODY MASS INDEX (BMI) OF 32.0 TO 32.9 IN ADULT: Status: ACTIVE | Noted: 2025-05-30

## 2025-08-13 PROBLEM — H93.8X3 SENSATION OF FULLNESS IN BOTH EARS: Status: RESOLVED | Noted: 2025-05-30 | Resolved: 2025-08-13

## 2025-08-13 PROBLEM — H61.23 BILATERAL IMPACTED CERUMEN: Status: RESOLVED | Noted: 2025-05-30 | Resolved: 2025-08-13

## 2025-08-14 ENCOUNTER — HOSPITAL ENCOUNTER (OUTPATIENT)
Dept: CARDIOLOGY | Facility: CLINIC | Age: 75
Discharge: HOME | End: 2025-08-14
Payer: MEDICARE

## 2025-08-14 ENCOUNTER — OFFICE VISIT (OUTPATIENT)
Dept: CARDIOLOGY | Facility: CLINIC | Age: 75
End: 2025-08-14
Payer: MEDICARE

## 2025-08-14 VITALS
HEIGHT: 70 IN | OXYGEN SATURATION: 97 % | DIASTOLIC BLOOD PRESSURE: 60 MMHG | WEIGHT: 219 LBS | SYSTOLIC BLOOD PRESSURE: 118 MMHG | HEART RATE: 71 BPM | BODY MASS INDEX: 31.35 KG/M2

## 2025-08-14 DIAGNOSIS — I49.8 BIGEMINAL RHYTHM: Primary | ICD-10-CM

## 2025-08-14 DIAGNOSIS — R06.09 EXERTIONAL DYSPNEA: ICD-10-CM

## 2025-08-14 DIAGNOSIS — I95.0 IDIOPATHIC HYPOTENSION: ICD-10-CM

## 2025-08-14 DIAGNOSIS — R79.89 ELEVATED TROPONIN: ICD-10-CM

## 2025-08-14 DIAGNOSIS — E78.5 HYPERLIPIDEMIA, UNSPECIFIED HYPERLIPIDEMIA TYPE: ICD-10-CM

## 2025-08-14 DIAGNOSIS — R00.2 PALPITATIONS: ICD-10-CM

## 2025-08-14 DIAGNOSIS — I49.8 BIGEMINAL RHYTHM: ICD-10-CM

## 2025-08-14 PROBLEM — R07.9 CHEST PAIN, UNSPECIFIED TYPE: Status: RESOLVED | Noted: 2024-02-21 | Resolved: 2025-08-14

## 2025-08-14 PROBLEM — I10 HYPERTENSION: Status: RESOLVED | Noted: 2025-05-30 | Resolved: 2025-08-14

## 2025-08-14 LAB — BODY SURFACE AREA: 2.21 M2

## 2025-08-14 PROCEDURE — 99215 OFFICE O/P EST HI 40 MIN: CPT | Performed by: INTERNAL MEDICINE

## 2025-08-14 PROCEDURE — 1159F MED LIST DOCD IN RCRD: CPT | Performed by: INTERNAL MEDICINE

## 2025-08-14 PROCEDURE — 93225 XTRNL ECG REC<48 HRS REC: CPT

## 2025-08-14 PROCEDURE — 99212 OFFICE O/P EST SF 10 MIN: CPT

## 2025-08-14 RX ORDER — FLUDROCORTISONE ACETATE 0.1 MG/1
0.1 TABLET ORAL DAILY
Qty: 30 TABLET | Refills: 11 | Status: SHIPPED | OUTPATIENT
Start: 2025-08-14 | End: 2026-08-14

## 2025-08-14 RX ORDER — FLUDROCORTISONE ACETATE 0.1 MG/1
0.1 TABLET ORAL DAILY
Qty: 30 TABLET | Refills: 11 | Status: SHIPPED | OUTPATIENT
Start: 2025-08-14 | End: 2025-08-14 | Stop reason: SDUPTHER

## 2025-08-14 ASSESSMENT — ENCOUNTER SYMPTOMS
SYNCOPE: 1
DYSPNEA ON EXERTION: 1
MEMORY LOSS: 1

## 2025-08-26 LAB — BODY SURFACE AREA: 2.21 M2

## 2025-09-15 ENCOUNTER — APPOINTMENT (OUTPATIENT)
Dept: CARDIOLOGY | Facility: CLINIC | Age: 75
End: 2025-09-15
Payer: MEDICARE

## 2025-11-17 ENCOUNTER — APPOINTMENT (OUTPATIENT)
Dept: CARDIOLOGY | Facility: CLINIC | Age: 75
End: 2025-11-17
Payer: MEDICARE

## 2025-12-02 ENCOUNTER — APPOINTMENT (OUTPATIENT)
Dept: PRIMARY CARE | Facility: CLINIC | Age: 75
End: 2025-12-02
Payer: MEDICARE

## 2026-01-26 ENCOUNTER — APPOINTMENT (OUTPATIENT)
Dept: GERIATRIC MEDICINE | Facility: CLINIC | Age: 76
End: 2026-01-26
Payer: MEDICARE